# Patient Record
Sex: FEMALE | Race: WHITE | Employment: FULL TIME | URBAN - METROPOLITAN AREA
[De-identification: names, ages, dates, MRNs, and addresses within clinical notes are randomized per-mention and may not be internally consistent; named-entity substitution may affect disease eponyms.]

---

## 2017-01-26 RX ORDER — FLUCONAZOLE 150 MG/1
150 TABLET ORAL ONCE
Qty: 2 TABLET | Refills: 0 | Status: SHIPPED | OUTPATIENT
Start: 2017-01-26 | End: 2017-01-26

## 2017-03-02 ENCOUNTER — HOSPITAL ENCOUNTER (OUTPATIENT)
Age: 42
Setting detail: SPECIMEN
Discharge: HOME | End: 2017-03-02
Payer: COMMERCIAL

## 2017-03-02 ENCOUNTER — OFFICE VISIT (OUTPATIENT)
Dept: OBGYN | Facility: CLINIC | Age: 42
End: 2017-03-02

## 2017-03-02 VITALS
RESPIRATION RATE: 18 BRPM | SYSTOLIC BLOOD PRESSURE: 132 MMHG | WEIGHT: 221 LBS | HEART RATE: 84 BPM | DIASTOLIC BLOOD PRESSURE: 86 MMHG | HEIGHT: 64 IN | BODY MASS INDEX: 37.73 KG/M2

## 2017-03-02 DIAGNOSIS — Z01.411 ENCOUNTER FOR GYNECOLOGICAL EXAMINATION (GENERAL) (ROUTINE) WITH ABNORMAL FINDINGS: ICD-10-CM

## 2017-03-02 DIAGNOSIS — Z87.42 HISTORY OF ENDOMETRIOSIS: ICD-10-CM

## 2017-03-02 DIAGNOSIS — Z12.31 ENCOUNTER FOR SCREENING MAMMOGRAM FOR BREAST CANCER: ICD-10-CM

## 2017-03-02 DIAGNOSIS — Z01.419 WELL FEMALE EXAM WITH ROUTINE GYNECOLOGICAL EXAM: Primary | ICD-10-CM

## 2017-03-02 DIAGNOSIS — Z11.51 SPECIAL SCREENING EXAMINATION FOR HUMAN PAPILLOMAVIRUS (HPV): ICD-10-CM

## 2017-03-02 PROCEDURE — 99396 PREV VISIT EST AGE 40-64: CPT | Performed by: ADVANCED PRACTICE MIDWIFE

## 2017-03-02 PROCEDURE — 87624 HPV HI-RISK TYP POOLED RSLT: CPT

## 2017-03-02 PROCEDURE — G0145 SCR C/V CYTO,THINLAYER,RESCR: HCPCS

## 2017-03-02 RX ORDER — IBUPROFEN 600 MG/1
600 TABLET ORAL EVERY 6 HOURS PRN
Qty: 30 TABLET | Refills: 1 | Status: SHIPPED | OUTPATIENT
Start: 2017-03-02 | End: 2020-01-29

## 2017-03-02 ASSESSMENT — PATIENT HEALTH QUESTIONNAIRE - PHQ9
SUM OF ALL RESPONSES TO PHQ9 QUESTIONS 1 & 2: 0
1. LITTLE INTEREST OR PLEASURE IN DOING THINGS: 0
2. FEELING DOWN, DEPRESSED OR HOPELESS: 0
SUM OF ALL RESPONSES TO PHQ QUESTIONS 1-9: 0

## 2017-03-06 LAB
HPV SAMPLE: NORMAL
HPV SOURCE: NORMAL
HPV, GENOTYPE 16: NOT DETECTED
HPV, GENOTYPE 18: NOT DETECTED
HPV, HIGH RISK OTHER: NOT DETECTED
HPV, INTERPRETATION: NORMAL

## 2017-03-12 LAB — CYTOLOGY REPORT: NORMAL

## 2017-03-14 ENCOUNTER — HOSPITAL ENCOUNTER (OUTPATIENT)
Dept: WOMENS IMAGING | Age: 42
Discharge: HOME | End: 2017-03-14
Payer: COMMERCIAL

## 2017-03-14 ENCOUNTER — TELEPHONE (OUTPATIENT)
Dept: OBGYN CLINIC | Age: 42
End: 2017-03-14

## 2017-03-14 DIAGNOSIS — N63.0 BREAST MASS: Primary | ICD-10-CM

## 2017-03-14 DIAGNOSIS — Z87.42 HISTORY OF ENDOMETRIOSIS: ICD-10-CM

## 2017-03-14 DIAGNOSIS — N63.0 BREAST MASS SEEN ON MAMMOGRAM: ICD-10-CM

## 2017-03-14 DIAGNOSIS — Z12.31 ENCOUNTER FOR SCREENING MAMMOGRAM FOR BREAST CANCER: ICD-10-CM

## 2017-03-14 DIAGNOSIS — N93.9 ABNORMAL UTERINE BLEEDING: ICD-10-CM

## 2017-03-14 DIAGNOSIS — Z01.419 WELL FEMALE EXAM WITH ROUTINE GYNECOLOGICAL EXAM: ICD-10-CM

## 2017-03-14 PROCEDURE — 76856 US EXAM PELVIC COMPLETE: CPT

## 2017-03-14 PROCEDURE — G0202 SCR MAMMO BI INCL CAD: HCPCS

## 2017-03-14 PROCEDURE — 76830 TRANSVAGINAL US NON-OB: CPT

## 2017-03-23 ENCOUNTER — HOSPITAL ENCOUNTER (OUTPATIENT)
Dept: WOMENS IMAGING | Age: 42
Discharge: HOME | End: 2017-03-23
Payer: COMMERCIAL

## 2017-03-23 DIAGNOSIS — N63.0 BREAST MASS SEEN ON MAMMOGRAM: ICD-10-CM

## 2017-03-23 DIAGNOSIS — N63.0 BREAST MASS: ICD-10-CM

## 2017-03-23 DIAGNOSIS — N63.20 LEFT BREAST MASS: ICD-10-CM

## 2017-03-23 PROCEDURE — 76642 ULTRASOUND BREAST LIMITED: CPT

## 2017-03-24 ENCOUNTER — TELEPHONE (OUTPATIENT)
Dept: OBGYN CLINIC | Age: 42
End: 2017-03-24

## 2017-03-27 DIAGNOSIS — N63.0 BREAST MASS: Primary | ICD-10-CM

## 2017-04-06 ENCOUNTER — HOSPITAL ENCOUNTER (OUTPATIENT)
Dept: WOMENS IMAGING | Age: 42
Discharge: HOME | End: 2017-04-06
Payer: COMMERCIAL

## 2017-04-06 VITALS — SYSTOLIC BLOOD PRESSURE: 129 MMHG | DIASTOLIC BLOOD PRESSURE: 93 MMHG | HEART RATE: 96 BPM

## 2017-04-06 DIAGNOSIS — R92.8 ABNORMAL MAMMOGRAM: ICD-10-CM

## 2017-04-06 PROCEDURE — 88305 TISSUE EXAM BY PATHOLOGIST: CPT

## 2017-04-06 PROCEDURE — G0206 DX MAMMO INCL CAD UNI: HCPCS

## 2017-04-06 PROCEDURE — 19083 BX BREAST 1ST LESION US IMAG: CPT

## 2017-04-07 LAB — SURGICAL PATHOLOGY REPORT: NORMAL

## 2017-04-11 ENCOUNTER — OFFICE VISIT (OUTPATIENT)
Dept: OBGYN CLINIC | Age: 42
End: 2017-04-11
Payer: COMMERCIAL

## 2017-04-11 VITALS
SYSTOLIC BLOOD PRESSURE: 118 MMHG | HEART RATE: 78 BPM | WEIGHT: 220 LBS | HEIGHT: 64 IN | DIASTOLIC BLOOD PRESSURE: 76 MMHG | BODY MASS INDEX: 37.56 KG/M2

## 2017-04-11 DIAGNOSIS — F41.9 ANXIETY: ICD-10-CM

## 2017-04-11 DIAGNOSIS — N83.201 CYST OF RIGHT OVARY: Primary | ICD-10-CM

## 2017-04-11 PROCEDURE — 99213 OFFICE O/P EST LOW 20 MIN: CPT | Performed by: OBSTETRICS & GYNECOLOGY

## 2017-07-11 ENCOUNTER — HOSPITAL ENCOUNTER (OUTPATIENT)
Age: 42
Setting detail: SPECIMEN
Discharge: HOME | End: 2017-07-11
Payer: COMMERCIAL

## 2017-07-11 LAB
ALT SERPL-CCNC: 28 U/L (ref 5–33)
ANION GAP SERPL CALCULATED.3IONS-SCNC: 14 MMOL/L (ref 9–17)
BUN BLDV-MCNC: 11 MG/DL (ref 6–20)
CHLORIDE BLD-SCNC: 98 MMOL/L (ref 98–107)
CHOLESTEROL, FASTING: 168 MG/DL
CHOLESTEROL/HDL RATIO: 4
CO2: 25 MMOL/L (ref 20–31)
CREAT SERPL-MCNC: 0.54 MG/DL (ref 0.5–0.9)
CREATININE URINE: 183.4 MG/DL (ref 28–217)
GFR AFRICAN AMERICAN: >60 ML/MIN
GFR NON-AFRICAN AMERICAN: >60 ML/MIN
GFR SERPL CREATININE-BSD FRML MDRD: NORMAL ML/MIN/{1.73_M2}
GFR SERPL CREATININE-BSD FRML MDRD: NORMAL ML/MIN/{1.73_M2}
HDLC SERPL-MCNC: 42 MG/DL
LDL CHOLESTEROL: 104 MG/DL (ref 0–130)
MICROALBUMIN/CREAT 24H UR: <12 MG/L
MICROALBUMIN/CREAT UR-RTO: 7 MCG/MG CREAT
POTASSIUM SERPL-SCNC: 4.2 MMOL/L (ref 3.7–5.3)
SODIUM BLD-SCNC: 137 MMOL/L (ref 135–144)
TRIGLYCERIDE, FASTING: 108 MG/DL
VLDLC SERPL CALC-MCNC: NORMAL MG/DL (ref 1–30)

## 2017-10-04 ENCOUNTER — TELEPHONE (OUTPATIENT)
Dept: OBGYN CLINIC | Age: 42
End: 2017-10-04

## 2017-10-04 RX ORDER — FLUCONAZOLE 150 MG/1
150 TABLET ORAL ONCE
Qty: 2 TABLET | Refills: 0 | Status: SHIPPED | OUTPATIENT
Start: 2017-10-04 | End: 2017-10-04

## 2017-10-11 ENCOUNTER — HOSPITAL ENCOUNTER (OUTPATIENT)
Dept: WOMENS IMAGING | Age: 42
Discharge: HOME | End: 2017-10-11
Payer: COMMERCIAL

## 2017-10-11 DIAGNOSIS — Z09 FOLLOW-UP EXAM, 3-6 MONTHS SINCE PREVIOUS EXAM: ICD-10-CM

## 2017-10-11 PROCEDURE — G0279 TOMOSYNTHESIS, MAMMO: HCPCS

## 2018-01-02 ENCOUNTER — TELEPHONE (OUTPATIENT)
Dept: OBGYN CLINIC | Age: 43
End: 2018-01-02

## 2018-01-02 RX ORDER — FLUCONAZOLE 150 MG/1
150 TABLET ORAL ONCE
Qty: 2 TABLET | Refills: 0 | Status: SHIPPED | OUTPATIENT
Start: 2018-01-02 | End: 2018-01-02

## 2018-01-02 NOTE — TELEPHONE ENCOUNTER
Pt has symptoms of yeast infection , can she get a script called into [facility]

## 2018-03-12 ENCOUNTER — TELEPHONE (OUTPATIENT)
Dept: OBGYN CLINIC | Age: 43
End: 2018-03-12

## 2018-03-12 RX ORDER — FLUCONAZOLE 150 MG/1
150 TABLET ORAL ONCE
Qty: 2 TABLET | Refills: 0 | Status: SHIPPED | OUTPATIENT
Start: 2018-03-12 | End: 2018-07-24 | Stop reason: SDUPTHER

## 2018-03-27 ENCOUNTER — OFFICE VISIT (OUTPATIENT)
Dept: OBGYN CLINIC | Age: 43
End: 2018-03-27
Payer: COMMERCIAL

## 2018-03-27 VITALS
DIASTOLIC BLOOD PRESSURE: 72 MMHG | HEIGHT: 64 IN | SYSTOLIC BLOOD PRESSURE: 124 MMHG | HEART RATE: 78 BPM | WEIGHT: 229 LBS | BODY MASS INDEX: 39.09 KG/M2 | RESPIRATION RATE: 18 BRPM

## 2018-03-27 DIAGNOSIS — N89.8 VAGINAL IRRITATION: Primary | ICD-10-CM

## 2018-03-27 DIAGNOSIS — Z01.419 ENCOUNTER FOR GYNECOLOGICAL EXAMINATION (GENERAL) (ROUTINE) WITHOUT ABNORMAL FINDINGS: ICD-10-CM

## 2018-03-27 PROCEDURE — 99396 PREV VISIT EST AGE 40-64: CPT | Performed by: ADVANCED PRACTICE MIDWIFE

## 2018-03-27 RX ORDER — CLOTRIMAZOLE AND BETAMETHASONE DIPROPIONATE 10; .64 MG/G; MG/G
CREAM TOPICAL
Qty: 1 TUBE | Refills: 1 | Status: SHIPPED | OUTPATIENT
Start: 2018-03-27 | End: 2018-03-27 | Stop reason: SDUPTHER

## 2018-03-27 RX ORDER — CLOTRIMAZOLE AND BETAMETHASONE DIPROPIONATE 10; .64 MG/G; MG/G
CREAM TOPICAL
Qty: 1 TUBE | Refills: 1 | Status: SHIPPED | OUTPATIENT
Start: 2018-03-27 | End: 2020-09-22

## 2018-03-27 ASSESSMENT — PATIENT HEALTH QUESTIONNAIRE - PHQ9
SUM OF ALL RESPONSES TO PHQ9 QUESTIONS 1 & 2: 0
1. LITTLE INTEREST OR PLEASURE IN DOING THINGS: 0
SUM OF ALL RESPONSES TO PHQ QUESTIONS 1-9: 0
2. FEELING DOWN, DEPRESSED OR HOPELESS: 0

## 2018-03-27 NOTE — PROGRESS NOTES
Density:      ________________________________________________________________________  REVIEW OF SYSTEMS:    yes   A minimum of an eleven point review of systems was completed. Review Of Systems (11 point):  Constitutional: No fever, chills or malaise; No weight change or fatigue  Head and Eyes: No vision, Headache, Dizziness or trauma in last 12 months  ENT ROS: No hearing, Tinnitis, sinus or taste problems  Hematological and Lymphatic ROS:No Lymphoma, Von Willebrand's, Hemophillia or Bleeding History  Psych ROS: No Depression, Homicidal thoughts,suicidal thoughts, or anxiety  Breast ROS: No prior breast abnormalities or lumps  Respiratory ROS: No SOB, Pneumoniae,Cough, or Pulmonary Embolism History  Cardiovascular ROS: No Chest Pain with Exertion, Palpitations, Syncope, Edema, Arrhythmia  Gastrointestinal ROS: No Indigestion, Heartburn, Nausea, vomiting, Diarrhea, Constipation,or Bowel Changes; No Bloody Stools or melena  Genito-Urinary ROS: No Dysuria, Hematuria or Nocturia. No Urinary Incontinence or Vaginal Discharge  Musculoskeletal ROS: No Arthralgia, Arthritis,Gout,Osteoporosis or Rheumatism  Neurological ROS: No CVA, Migraines, Epilepsy, Seizure Hx, or Limb Weakness  Dermatological ROS: No Rash, Itching, Hives, Mole Changes or Cancer                                                                                                                                                                                                                                  PHYSICAL Exam:     Constitutional:  Vitals:    03/27/18 1504   BP: 124/72   Site: Left Arm   Position: Sitting   Cuff Size: Medium Adult   Pulse: 78   Resp: 18   Weight: 229 lb (103.9 kg)   Height: 5' 4\" (1.626 m)       General Appearance: This  is a well Developed, well Nourished, well groomed female. Her BMI was reviewed. Nutritional decision making was discussed.     Skin:  There was a Normal Inspection of the skin without rashes or lesions. There were no rashes. (Papular, Maculopapular, Hives, Pustular, Macular)     There were no lesions (Ulcers, Erythema, Abn. Appearing Nevi)            Lymphatic:  No Lymph Nodes were Palpable in the neck , axilla or groin. # Of Lymph Nodes; Location ; Character [Normal]  [Shotty] [Tender] [Enlarged]     Neck and EENT:  The neck was supple. There were no masses   The thyroid was not enlarged and had no masses. Perrla, EOMI B/L, TMI B/L No Abnormalities. Throat inspected-No exudates or Masses, Nares Patent No Masses        Respiratory: The lungs were auscultated and found to be clear. There were no rales, rhonchi or wheezes. There was a good respiratory effort. Cardiovascular: The heart was in a regular rate and rhythm. . No S3 or S4. There was no murmur appreciated. Location, grade, and radiation are not applicable. Extremities: The patients extremities were without calf tenderness, edema, or varicosities. There was full range of motion in all four extremities. Pulses in all four extremities were appreciated and are 2/4. Abdomen: The abdomen was soft and non-tender. There were good bowel sounds in all quadrants and there was no guarding, rebound or rigidity. On evaluation there was no evidence of hepatosplenomegaly and there was no costal vertebral laurel tenderness bilaterally. No hernias were appreciated. Abdominal Scars: C/S    Psych: The patient had a normal Orientation to: Time, Place, Person, and Situation  There is no Mood / Affect changes    Breast:  (Chest)  normal appearance, no masses or tenderness, Inspection negative  Self breast exams were reviewed in detail. Literature was given. Pelvic Exam:  Vulva and vagina appear normal. Bimanual exam reveals absent uterus and adnexa, cuff intact. White discharge noted    Rectal Exam:  exam declined by patient          Musculosk:  Normal Gait and station was noted. Digits were evaluated without abnormal findings.   Range of motion, stability and strength were evaluated and found to be appropriate for the patients age. OM Structural Component:  ASSESSMENT:      40 y.o. Annual  No diagnosis found. Chief Complaint   Patient presents with    Annual Exam          Past Medical History:   Diagnosis Date    Anxiety     Endometriosis     GDM (gestational diabetes mellitus)     H/O hysterectomy for benign disease          Patient Active Problem List    Diagnosis Date Noted    H/O hysterectomy for benign disease     Anxiety     GDM (gestational diabetes mellitus)     Endometriosis      replace inactive diagnosis            Hereditary Breast, Ovarian, Colon and Uterine Cancer screening Done. Tobacco & Secondary smoke risks reviewed; instructed on cessation and avoidance      Counseling Completed:  Preventative Health Recommendations and Follow up. The patient was informed of the recommended preventative health recommendations. 1. Annuals every year; Cytology collections per prevailing guidelines. 2. Mammograms begin every year at 35 yo if no abnormalities are found and no family     History. 3. Bone density studies every 2-3 years. Begin at 71 yo. If no fracture history or osteoporosis family history. (significant). 4. Colonoscopy begin at 49 yo. Repeat every ten years if negative and no family history. 5. Calcium of 9657-1265 mg/day in split dosing  6. Vitamin D 400-800 IU/day  7. All other preventative health recommendations will be managed by the patients Primary care physician. PLAN:  Return in about 1 year (around 3/27/2019) for Annual.   Rx for Lotrisone sent to pharmacy for external itching in vaginal area  Repeat Annual every 1 year  Cervical Cytology Evaluation begins at 24years old. If Negative Cytology, Follow-up screening per current guidelines. Mammograms every 1 year. If 35 yo and last mammogram was negative. Calcium and Vitamin D dosing reviewed.   Colonoscopy screening

## 2018-07-24 RX ORDER — FLUCONAZOLE 150 MG/1
TABLET ORAL
Qty: 2 TABLET | Refills: 0 | Status: SHIPPED | OUTPATIENT
Start: 2018-07-24 | End: 2019-06-14 | Stop reason: SDUPTHER

## 2019-02-06 DIAGNOSIS — Z12.31 SCREENING MAMMOGRAM, ENCOUNTER FOR: Primary | ICD-10-CM

## 2019-04-02 ENCOUNTER — HOSPITAL ENCOUNTER (OUTPATIENT)
Dept: WOMENS IMAGING | Age: 44
Discharge: HOME | End: 2019-04-04
Payer: COMMERCIAL

## 2019-04-02 ENCOUNTER — OFFICE VISIT (OUTPATIENT)
Dept: OBGYN CLINIC | Age: 44
End: 2019-04-02
Payer: COMMERCIAL

## 2019-04-02 VITALS
DIASTOLIC BLOOD PRESSURE: 80 MMHG | WEIGHT: 224 LBS | SYSTOLIC BLOOD PRESSURE: 124 MMHG | HEIGHT: 64 IN | HEART RATE: 91 BPM | RESPIRATION RATE: 18 BRPM | BODY MASS INDEX: 38.24 KG/M2

## 2019-04-02 DIAGNOSIS — Z12.31 SCREENING MAMMOGRAM, ENCOUNTER FOR: ICD-10-CM

## 2019-04-02 DIAGNOSIS — Z01.411 ABNORMAL FEMALE PELVIC EXAM: Primary | ICD-10-CM

## 2019-04-02 DIAGNOSIS — N63.20 LEFT BREAST LUMP: ICD-10-CM

## 2019-04-02 DIAGNOSIS — N63.0 LUMP IN FEMALE BREAST: ICD-10-CM

## 2019-04-02 DIAGNOSIS — Z12.39 SCREENING FOR BREAST CANCER: ICD-10-CM

## 2019-04-02 DIAGNOSIS — R10.2 PELVIC PAIN: ICD-10-CM

## 2019-04-02 PROCEDURE — 76642 ULTRASOUND BREAST LIMITED: CPT

## 2019-04-02 PROCEDURE — 99396 PREV VISIT EST AGE 40-64: CPT | Performed by: NURSE PRACTITIONER

## 2019-04-02 PROCEDURE — G0279 TOMOSYNTHESIS, MAMMO: HCPCS

## 2019-04-02 ASSESSMENT — PATIENT HEALTH QUESTIONNAIRE - PHQ9
SUM OF ALL RESPONSES TO PHQ QUESTIONS 1-9: 0
SUM OF ALL RESPONSES TO PHQ QUESTIONS 1-9: 0
1. LITTLE INTEREST OR PLEASURE IN DOING THINGS: 0
2. FEELING DOWN, DEPRESSED OR HOPELESS: 0
SUM OF ALL RESPONSES TO PHQ9 QUESTIONS 1 & 2: 0

## 2019-04-02 NOTE — PROGRESS NOTES
History and Physical  [address], [address] [place] [address], [address] [place] [address] [phone]   Fax [phone]  Netta Avila  2019              38 y.o. Chief Complaint   Patient presents with   [facility] Gynecologic Exam     abd cramping off and on and breast soreness        Patient's last menstrual period was 2011. Primary Care Physician: Guillaume Henley    The patient was seen and examined. She has no chief complaint today and is here for her annual exam. History of VALENTIN with left oophorectomy in  for pelvic endometriosis. Denies hx of abnormal pap smears. Complaining of right sided pelvic pain- intermittent, menstrual cramping. Patient reports pain occurs monthly - lasting 2 days approximately out of each month. History of right ovarian cyst in . Her bowels are regular. There are no voiding complaints. She denies any bloating. She denies vaginal discharge and was counseled on STD's and the need for barrier contraception. HPI : April Berenice Doherty is a 38 y.o. female     Annual exam  Right sided pelvic pain  ________________________________________________________________________  OB History    Para Term  AB Living   3 2 2 0 0 2   SAB TAB Ectopic Molar Multiple Live Births   0 0 0 0 0 2      # Outcome Date GA Lbr Glenn/2nd Weight Sex Delivery Anes PTL Lv   3                Birth Comments: System Generated. Please review and update pregnancy details.    2 Term    8 lb 8 oz (3.856 kg) M CS-Unspec   NORMA   1 Term    8 lb 4 oz (3.742 kg) M CS-Unspec   NORMA     Past Medical History:   Diagnosis Date    Anxiety     Endometriosis     GDM (gestational diabetes mellitus)     H/O hysterectomy for benign disease                                                                    Past Surgical History:   Procedure Laterality Date     SECTION      x2    ENTEROCELE REPAIR  12    HYSTERECTOMY  12    LAPAROSCOPY then daily for 3 days 1 Tube 1    escitalopram (LEXAPRO) 20 MG tablet Take 20 mg by mouth 2 times daily.  fluconazole (DIFLUCAN) 150 MG tablet TAKE 1 TABLET BY MOUTH ONCE FOR 1 DOSE.  MAY REPEAT DOSE IN 1 WEEK AS NEEDED. 2 tablet 0    ibuprofen (ADVIL;MOTRIN) 600 MG tablet Take 1 tablet by mouth every 6 hours as needed for Pain 30 tablet 1     No current facility-administered medications for this visit. ALLERGIES:  Allergies as of 04/02/2019    (No Known Allergies)       Symptoms of decreased mood absent  Symptoms of anhedonia absent    **If either question is answered in a  positive fashion then complete the PHQ9 Scoring Evaluation and make the appropriate referral**      Immunization status: stated as current, but no records available. Gynecologic History:  Menarche: 16 yo  Menopause at [address] [place] yo     Patient's last menstrual period was 11/24/2011. Sexually Active: Yes    STD History: No     Permanent Sterilization: Yes hysterectomy   Reversible Birth Control: No        Hormone Replacement Exposure: No      Genetic Qualified Family History of Breast, Ovarian , Colon or Uterine Cancer: no     If YES see scanned worksheet. Preventative Health Testing:    Health Maintenance:  Health Maintenance Due   Topic Date Due    HIV screen  04/02/1991    DTaP/Tdap/Td vaccine (1 - Tdap) 04/02/1995    Diabetes screen  04/02/2016       Date of Last Pap Smear: 3/2017 neg/neg  Abnormal Pap Smear History: neg  Colposcopy History:   Date of Last Mammogram: 10/11/2017 left diagnostic negative  Date of Last Colonoscopy:   Date of Last Bone Density:      ________________________________________________________________________        REVIEW OF SYSTEMS:    yes   A minimum of an eleven point review of systems was completed. Review Of Systems (11 point):  Constitutional: No fever, chills or malaise;  No weight change or fatigue  Head and Eyes: No vision, Headache, Dizziness or trauma in last 12 months  ENT ROS: No hearing, Tinnitis, sinus or taste problems  Hematological and Lymphatic ROS:No Lymphoma, Von Willebrand's, Hemophillia or Bleeding History  Psych ROS: No Depression, Homicidal thoughts,suicidal thoughts, or anxiety  Breast ROS: No prior breast abnormalities or lumps. + left clip placed 4/2017  Respiratory ROS: No SOB, Pneumoniae,Cough, or Pulmonary Embolism History  Cardiovascular ROS: No Chest Pain with Exertion, Palpitations, Syncope, Edema, Arrhythmia  Gastrointestinal ROS: No Indigestion, Heartburn, Nausea, vomiting, Diarrhea, Constipation,or Bowel Changes; No Bloody Stools or melena  Genito-Urinary ROS: No Dysuria, Hematuria or Nocturia. No Urinary Incontinence or Vaginal Discharge  Musculoskeletal ROS: No Arthralgia, Arthritis,Gout,Osteoporosis or Rheumatism  Neurological ROS: No CVA, Migraines, Epilepsy, Seizure Hx, or Limb Weakness. + diabetes  Dermatological ROS: No Rash, Itching, Hives, Mole Changes or Cancer                                                                                                                                                                                                                                  PHYSICAL Exam:     Constitutional:  Vitals:    04/02/19 0834   BP: 124/80   Site: Right Upper Arm   Position: Sitting   Cuff Size: Large Adult   Pulse: 91   Resp: 18   Weight: 224 lb (101.6 kg)   Height: 5' 4\" (1.626 m)         General Appearance: This  is a well Developed, well Nourished, well groomed female. Her BMI was reviewed. Nutritional decision making was discussed. Skin:  There was a Normal Inspection of the skin without rashes or lesions. There were no rashes. (Papular, Maculopapular, Hives, Pustular, Macular)     There were no lesions (Ulcers, Erythema, Abn. Appearing Nevi)            Lymphatic:  No Lymph Nodes were Palpable in the neck , axilla or groin.  0 # Of Lymph Nodes;  Location ; Character [Normal]  [Shotty] [Tender] [Enlarged]     Neck and EENT:  The neck was supple. There were no masses   The thyroid was not enlarged and had no masses. Perrla, EOMI B/L, TMI B/L No Abnormalities. Throat inspected-No exudates or Masses, Nares Patent No Masses        Respiratory: The lungs were auscultated and found to be clear. There were no rales, rhonchi or wheezes. There was a good respiratory effort. Cardiovascular: The heart was in a regular rate and rhythm. . No S3 or S4. There was no murmur appreciated. Location, grade, and radiation are not applicable. Extremities: The patients extremities were without calf tenderness, edema, or varicosities. There was full range of motion in all four extremities. Pulses in all four extremities were appreciated and are 2/4. Abdomen: The abdomen was soft and non-tender. There were good bowel sounds in all quadrants and there was no guarding, rebound or rigidity. On evaluation there was no evidence of hepatosplenomegaly and there was no costal vertebral laurel tenderness bilaterally. No hernias were appreciated. Abdominal Scars: hyst scar    Psych: The patient had a normal Orientation to: Time, Place, Person, and Situation  There is no Mood / Affect changes    Breast:  (Chest)  normal appearance. No nipple retraction or dimpling, No nipple discharge or bleeding, No axillary or supraclavicular adenopathy. Right breast lump palpated at 2 o'clock position, dense breast tissue noted bilaterally. Self breast exams were reviewed in detail. Literature was given. Pelvic Exam:  Vulva and vagina appear normal. Bimanual exam reveals normal cuff intact    Rectal Exam:  exam declined by patient          Musculosk:  Normal Gait and station was noted. Digits were evaluated without abnormal findings. Range of motion, stability and strength were evaluated and found to be appropriate for the patients age. ASSESSMENT:      38 y.o. Annual   Diagnosis Orders   1. Abnormal female pelvic exam     2.  Pelvic pain Mammograms every 1 year. If 35 yo and last mammogram was negative. Calcium and Vitamin D dosing reviewed. Colonoscopy screening reviewed as well as onset for bone density testing. Birth control and barrier recommendations discussed. STD counseling and prevention reviewed. Gardisil counseling completed for all patients 10-35 yo. Routine health maintenance per patients PCP.   Orders Placed This Encounter   Procedures    US Pelvis Complete     Standing Status:   Future     Standing Expiration Date:   7/2/2019     Order Specific Question:   Reason for exam:     Answer:   pelvic pain- right sided    US Non OB Transvaginal     Standing Status:   Future     Standing Expiration Date:   10/2/2019     Order Specific Question:   Reason for exam:     Answer:   pelvic pain- right sided    CHRISTOPHER DIGITAL DIAGNOSTIC W OR WO CAD BILATERAL     Standing Status:   Future     Standing Expiration Date:   6/2/2020     Order Specific Question:   Reason for exam:     Answer:   left breast lump at 2 o'clock position

## 2019-04-10 ENCOUNTER — OFFICE VISIT (OUTPATIENT)
Dept: OBGYN CLINIC | Age: 44
End: 2019-04-10
Payer: COMMERCIAL

## 2019-04-10 DIAGNOSIS — R10.2 PELVIC PAIN: ICD-10-CM

## 2019-04-10 PROCEDURE — 76830 TRANSVAGINAL US NON-OB: CPT | Performed by: OBSTETRICS & GYNECOLOGY

## 2019-06-03 ENCOUNTER — TELEPHONE (OUTPATIENT)
Dept: OBGYN CLINIC | Age: 44
End: 2019-06-03

## 2019-06-03 RX ORDER — FLUCONAZOLE 150 MG/1
150 TABLET ORAL ONCE
Qty: 2 TABLET | Refills: 0 | Status: SHIPPED | OUTPATIENT
Start: 2019-06-03 | End: 2019-06-03

## 2019-06-03 NOTE — TELEPHONE ENCOUNTER
Patient states she has a yeast infection and needs a prescription sent to [facility] in [place] on [address] [place] [address].

## 2019-06-14 ENCOUNTER — TELEPHONE (OUTPATIENT)
Dept: OBGYN CLINIC | Age: 44
End: 2019-06-14

## 2019-06-14 RX ORDER — FLUCONAZOLE 150 MG/1
TABLET ORAL
Qty: 2 TABLET | Refills: 0 | Status: SHIPPED | OUTPATIENT
Start: 2019-06-14 | End: 2020-09-22

## 2019-08-21 ENCOUNTER — TELEPHONE (OUTPATIENT)
Dept: OBGYN CLINIC | Age: 44
End: 2019-08-21

## 2019-08-21 RX ORDER — FLUCONAZOLE 150 MG/1
150 TABLET ORAL ONCE
Qty: 2 TABLET | Refills: 0 | Status: SHIPPED | OUTPATIENT
Start: 2019-08-21 | End: 2019-08-21

## 2020-09-22 ENCOUNTER — HOSPITAL ENCOUNTER (OUTPATIENT)
Dept: WOMENS IMAGING | Age: 45
Discharge: HOME | End: 2020-09-24
Payer: COMMERCIAL

## 2020-09-22 ENCOUNTER — OFFICE VISIT (OUTPATIENT)
Dept: OBGYN CLINIC | Age: 45
End: 2020-09-22
Payer: COMMERCIAL

## 2020-09-22 VITALS
HEART RATE: 86 BPM | TEMPERATURE: 97.9 F | SYSTOLIC BLOOD PRESSURE: 126 MMHG | WEIGHT: 239 LBS | BODY MASS INDEX: 40.8 KG/M2 | HEIGHT: 64 IN | DIASTOLIC BLOOD PRESSURE: 84 MMHG

## 2020-09-22 PROCEDURE — 76642 ULTRASOUND BREAST LIMITED: CPT

## 2020-09-22 PROCEDURE — 99396 PREV VISIT EST AGE 40-64: CPT | Performed by: NURSE PRACTITIONER

## 2020-09-22 PROCEDURE — G0279 TOMOSYNTHESIS, MAMMO: HCPCS

## 2020-09-22 ASSESSMENT — PATIENT HEALTH QUESTIONNAIRE - PHQ9
1. LITTLE INTEREST OR PLEASURE IN DOING THINGS: 0
SUM OF ALL RESPONSES TO PHQ QUESTIONS 1-9: 0
SUM OF ALL RESPONSES TO PHQ9 QUESTIONS 1 & 2: 0
2. FEELING DOWN, DEPRESSED OR HOPELESS: 0
SUM OF ALL RESPONSES TO PHQ QUESTIONS 1-9: 0

## 2020-09-22 NOTE — PROGRESS NOTES
History and Physical  [address], [address] [place] [address], [address] [facility] [phone]   Fax [phone]  April L Toeppe  2020              41 y.o. Chief Complaint   Patient presents with    Annual Exam       Patient's last menstrual period was 2011. Primary Care Physician: Rosenda Sommer    The patient was seen and examined. She has no chief complaint today and is here for her annual exam.  Her bowels are regular. There are no voiding complaints. She denies any bloating. She denies vaginal discharge and was counseled on STD's and the need for barrier contraception. HPI : Dave Cook is a 41 y.o. female     Annual exam  Complaining of left breast lump x 1 week. Tender with palpation. Denies nipple discharge. Hx of left breast biopsy for benign fibroadenoma  with clip placement. Patient reports lump is in different spot than previous biopsy. Family hx of breast cancer- maternal aunt with breast cancer- 76s. Hysterectomy in  for benign disease, endometriosis. ________________________________________________________________________  OB History    Para Term  AB Living   3 2 2 0 0 2   SAB TAB Ectopic Molar Multiple Live Births   0 0 0 0 0 2      # Outcome Date GA Lbr Glenn/2nd Weight Sex Delivery Anes PTL Lv   3                Birth Comments: System Generated. Please review and update pregnancy details.    2 Term    8 lb 8 oz (3.856 kg) M CS-Unspec   NORMA   1 Term    8 lb 4 oz (3.742 kg) M CS-Unspec   NORMA     Past Medical History:   Diagnosis Date    Anxiety     Endometriosis     GDM (gestational diabetes mellitus)     H/O hysterectomy for benign disease                                                                    Past Surgical History:   Procedure Laterality Date     SECTION      x2    ENTEROCELE REPAIR  12    HYSTERECTOMY  12    LAPAROSCOPY  13    lysis of adhesions  TUBAL LIGATION      w/ mirena removal     Family History   Problem Relation Age of Onset    Hypertension Mother     Breast Cancer Maternal Aunt     Cancer Neg Hx     Colon Cancer Neg Hx     Diabetes Neg Hx     Eclampsia Neg Hx     Ovarian Cancer Neg Hx      Labor Neg Hx     Spont Abortions Neg Hx     Stroke Neg Hx      Social History     Socioeconomic History    Marital status:      Spouse name: Not on file    Number of children: Not on file    Years of education: Not on file    Highest education level: Not on file   Occupational History    Not on file   Social Needs    Financial resource strain: Not on file    Food insecurity     Worry: Not on file     Inability: Not on file    Transportation needs     Medical: Not on file     Non-medical: Not on file   Tobacco Use    Smoking status: Never Smoker    Smokeless tobacco: Never Used   Substance and Sexual Activity    Alcohol use: No    Drug use: No    Sexual activity: Yes     Partners: Male     Birth control/protection: Surgical   Lifestyle    Physical activity     Days per week: Not on file     Minutes per session: Not on file    Stress: Not on file   Relationships    Social connections     Talks on phone: Not on file     Gets together: Not on file     Attends [employer] service: Not on file     Active member of club or organization: Not on file     Attends meetings of clubs or organizations: Not on file     Relationship status: Not on file    Intimate partner violence     Fear of current or ex partner: Not on file     Emotionally abused: Not on file     Physically abused: Not on file     Forced sexual activity: Not on file   Other Topics Concern    Not on file   Social History Narrative    Not on file       MEDICATIONS:  Current Outpatient Medications   Medication Sig Dispense Refill    nateglinide (STARLIX) 120 MG tablet Take 120 mg by mouth daily      pioglitazone (ACTOS) 45 MG tablet Take 45 mg by mouth daily  SITagliptin (JANUVIA) 100 MG tablet Take 100 mg by mouth daily      escitalopram (LEXAPRO) 20 MG tablet Take 20 mg by mouth 2 times daily. No current facility-administered medications for this visit. ALLERGIES:  Allergies as of 09/22/2020 - Review Complete 09/22/2020   Allergen Reaction Noted    Ketorolac Other (See Comments) 01/09/2020       Symptoms of decreased mood absent  Symptoms of anhedonia absent    **If either question is answered in a  positive fashion then complete the PHQ9 Scoring Evaluation and make the appropriate referral**      Immunization status: stated as current, but no records available. Gynecologic History:  Menarche: 16 yo  Menopause at [address] yo     Patient's last menstrual period was 11/24/2011. Sexually Active: Yes    STD History: No     Permanent Sterilization: Yes hyst   Reversible Birth Control: No        Hormone Replacement Exposure: No      Genetic Qualified Family History of Breast, Ovarian , Colon or Uterine Cancer: No     If YES see scanned worksheet. Preventative Health Testing:    Health Maintenance:  Health Maintenance Due   Topic Date Due    HIV screen  04/02/1991    DTaP/Tdap/Td vaccine (1 - Tdap) 04/02/1995    Diabetes screen  04/02/2016    Flu vaccine (1) 09/01/2020       Date of Last Pap Smear: 3/2/2017 neg/neg  Abnormal Pap Smear History: denies  Colposcopy History:   Date of Last Mammogram: 4/2/2019 benign - no evidence of malignancy. Follow up in 12 months. Date of Last Colonoscopy:   Date of Last Bone Density:      ________________________________________________________________________        REVIEW OF SYSTEMS:    yes   A minimum of an eleven point review of systems was completed. Review Of Systems (11 point):  Constitutional: No fever, chills or malaise;  No weight change or fatigue  Head and Eyes: No vision, Headache, Dizziness or trauma in last 12 months  ENT ROS: No hearing, Tinnitis, sinus or taste problems  Hematological and Lymphatic ROS:No Lymphoma, Von Willebrand's, Hemophillia or Bleeding History  Psych ROS: No Depression, Homicidal thoughts,suicidal thoughts, or anxiety  Breast ROS: No prior breast abnormalities or lumps. + left breast biopsy, + left breast lump  Respiratory ROS: No SOB, Pneumoniae,Cough, or Pulmonary Embolism History  Cardiovascular ROS: No Chest Pain with Exertion, Palpitations, Syncope, Edema, Arrhythmia  Gastrointestinal ROS: No Indigestion, Heartburn, Nausea, vomiting, Diarrhea, Constipation,or Bowel Changes; No Bloody Stools or melena  Genito-Urinary ROS: No Dysuria, Hematuria or Nocturia. No Urinary Incontinence or Vaginal Discharge  Musculoskeletal ROS: No Arthralgia, Arthritis,Gout,Osteoporosis or Rheumatism  Neurological ROS: No CVA, Migraines, Epilepsy, Seizure Hx, or Limb Weakness. + type II diabetes  Dermatological ROS: No Rash, Itching, Hives, Mole Changes or Cancer                                                                                                                                                                                                                                  PHYSICAL Exam:     Constitutional:  Vitals:    09/22/20 1224   BP: 126/84   Site: Right Upper Arm   Position: Sitting   Cuff Size: Large Adult   Pulse: 86   Temp: 97.9 °F (36.6 °C)   Weight: 239 lb (108.4 kg)   Height: 5' 4\" (1.626 m)         General Appearance: This  is a well Developed, well Nourished, well groomed female. Her BMI was reviewed. Nutritional decision making was discussed. Skin:  There was a Normal Inspection of the skin without rashes or lesions. There were no rashes. (Papular, Maculopapular, Hives, Pustular, Macular)     There were no lesions (Ulcers, Erythema, Abn. Appearing Nevi)            Lymphatic:  No Lymph Nodes were Palpable in the neck , axilla or groin.  0 # Of Lymph Nodes; Location ; Character [Normal]  [Shotty] [Tender] [Enlarged]     Neck and EENT:  The neck was supple.  There were no masses   The thyroid was not enlarged and had no masses. Perrla, EOMI B/L, TMI B/L No Abnormalities. Throat inspected-No exudates or Masses, Nares Patent No Masses        Respiratory: The lungs were auscultated and found to be clear. There were no rales, rhonchi or wheezes. There was a good respiratory effort. Cardiovascular: The heart was in a regular rate and rhythm. . No S3 or S4. There was no murmur appreciated. Location, grade, and radiation are not applicable. Extremities: The patients extremities were without calf tenderness, edema, or varicosities. There was full range of motion in all four extremities. Pulses in all four extremities were appreciated and are 2/4. Abdomen: The abdomen was soft and non-tender. There were good bowel sounds in all quadrants and there was no guarding, rebound or rigidity. On evaluation there was no evidence of hepatosplenomegaly and there was no costal vertebral laurel tenderness bilaterally. No hernias were appreciated. Abdominal Scars: hyst scar    Psych: The patient had a normal Orientation to: Time, Place, Person, and Situation  There is no Mood / Affect changes    Breast:  (Chest)  normal appearance, no masses or tenderness right side. No nipple retraction or dimpling, No nipple discharge or bleeding. Left breast lump palpated at 2 o'clock and 5 o'clock position. No nipple discharge or bleeding noted. No supraclavicular or axillary adenopathy noted. Self breast exams were reviewed in detail. Literature was given. Pelvic Exam:  Vulva and vagina appear normal. Bimanual exam reveals normal cuff intact. Rectal Exam:  exam declined by patient          Musculosk:  Normal Gait and station was noted. Digits were evaluated without abnormal findings. Range of motion, stability and strength were evaluated and found to be appropriate for the patients age. ASSESSMENT:      41 y.o. Annual   Diagnosis Orders   1.  Encounter for well woman exam with abnormal findings     2. Breast lump on left side at 2 o'clock position     3. Breast lump on left side at 5 o'clock position            Chief Complaint   Patient presents with    Annual Exam          Past Medical History:   Diagnosis Date    Anxiety     Endometriosis     GDM (gestational diabetes mellitus)     H/O hysterectomy for benign disease          Patient Active Problem List    Diagnosis Date Noted    H/O hysterectomy for benign disease     Anxiety     GDM (gestational diabetes mellitus)     Endometriosis      replace inactive diagnosis            Hereditary Breast, Ovarian, Colon and Uterine Cancer screening Done. Tobacco & Secondary smoke risks reviewed; instructed on cessation and avoidance      Counseling Completed:  Preventative Health Recommendations and Follow up. The patient was informed of the recommended preventative health recommendations. 1. Annuals every year; Cytology collections per prevailing guidelines. 2. Mammograms begin every year at 35 yo if no abnormalities are found and no family history. 3. Bone density studies every 2-3 years. Begin at 73 yo. If no fracture history or osteoporosis family history. (significant). 4. Colonoscopy begin at 40 yo. Repeat every ten years if negative and no family history. 5. Calcium of 4075-1280 mg/day in split dosing  6. Vitamin D 400-800 IU/day  7. All other preventative health recommendations will be managed by the patients Primary care physician. PLAN:  No follow-ups on file. Diagnostic mammogram and ultrasound previously ordered. Patient scheduled today for testing. Repeat Annual every 1 year  Cervical Cytology Evaluation begins at 24years old. If Negative Cytology, Follow-up screening per current guidelines. Mammograms every 1 year. If 35 yo and last mammogram was negative. Calcium and Vitamin D dosing reviewed.   Colonoscopy screening reviewed as well as onset for bone density testing. Birth control and barrier recommendations discussed. STD counseling and prevention reviewed. Gardisil counseling completed for all patients 10-35 yo. Routine health maintenance per patients PCP. No orders of the defined types were placed in this encounter.

## 2020-09-23 ENCOUNTER — TELEPHONE (OUTPATIENT)
Dept: OBGYN CLINIC | Age: 45
End: 2020-09-23

## 2020-09-23 NOTE — TELEPHONE ENCOUNTER
----- Message from CLAUDIO Cleaning CNP sent at 9/22/2020  5:08 PM EDT -----  Diagnostic mammogram benign. No evidence of malignancy in either breast.  Recommend annual screening in 12 months.   Please let patient know

## 2020-09-23 NOTE — TELEPHONE ENCOUNTER
Per Danette Bryan CNP, patient aware of benign breast imaging results with recommendations to follow up in 1 year for annual screening. Patient voiced an understanding of results and plan of care.

## 2022-04-19 ENCOUNTER — OFFICE VISIT (OUTPATIENT)
Dept: OBGYN CLINIC | Age: 47
End: 2022-04-19
Payer: COMMERCIAL

## 2022-04-19 VITALS
DIASTOLIC BLOOD PRESSURE: 94 MMHG | BODY MASS INDEX: 37.22 KG/M2 | WEIGHT: 218 LBS | HEIGHT: 64 IN | SYSTOLIC BLOOD PRESSURE: 150 MMHG

## 2022-04-19 DIAGNOSIS — Z01.411 ENCOUNTER FOR WELL WOMAN EXAM WITH ABNORMAL FINDINGS: Primary | ICD-10-CM

## 2022-04-19 DIAGNOSIS — N89.8 VAGINAL DISCHARGE: ICD-10-CM

## 2022-04-19 DIAGNOSIS — Z12.31 ENCOUNTER FOR SCREENING MAMMOGRAM FOR MALIGNANT NEOPLASM OF BREAST: ICD-10-CM

## 2022-04-19 PROCEDURE — 99396 PREV VISIT EST AGE 40-64: CPT | Performed by: NURSE PRACTITIONER

## 2022-04-19 RX ORDER — INSULIN DEGLUDEC INJECTION 100 U/ML
14 INJECTION, SOLUTION SUBCUTANEOUS DAILY
COMMUNITY

## 2022-04-19 RX ORDER — CLOTRIMAZOLE AND BETAMETHASONE DIPROPIONATE 10; .64 MG/G; MG/G
CREAM TOPICAL
Qty: 45 G | Refills: 1 | Status: SHIPPED | OUTPATIENT
Start: 2022-04-19

## 2022-04-19 RX ORDER — ESCITALOPRAM OXALATE 10 MG/1
TABLET ORAL
COMMUNITY
Start: 2022-04-05

## 2022-04-19 RX ORDER — SEMAGLUTIDE 1.34 MG/ML
INJECTION, SOLUTION SUBCUTANEOUS
COMMUNITY
Start: 2022-04-04

## 2022-04-19 RX ORDER — NAPROXEN 500 MG/1
TABLET ORAL
COMMUNITY
Start: 2022-04-04

## 2022-04-19 RX ORDER — FLUCONAZOLE 150 MG/1
150 TABLET ORAL ONCE
Qty: 2 TABLET | Refills: 2 | Status: SHIPPED | OUTPATIENT
Start: 2022-04-19 | End: 2022-04-19

## 2022-04-19 ASSESSMENT — PATIENT HEALTH QUESTIONNAIRE - PHQ9
1. LITTLE INTEREST OR PLEASURE IN DOING THINGS: 0
SUM OF ALL RESPONSES TO PHQ QUESTIONS 1-9: 0
2. FEELING DOWN, DEPRESSED OR HOPELESS: 0
SUM OF ALL RESPONSES TO PHQ9 QUESTIONS 1 & 2: 0
SUM OF ALL RESPONSES TO PHQ QUESTIONS 1-9: 0

## 2022-04-19 NOTE — PROGRESS NOTES
History and Physical  [address], [address] [place] [address], [address] [facility] [phone]   Fax [phone]  April YAMEL Avila  2022              47 y.o. Chief Complaint   Patient presents with    Annual Exam       Patient's last menstrual period was 2011. Primary Care Physician: Mika Warner    The patient was seen and examined. She has no chief complaint today and is here for her annual exam.  Her bowels are regular. There are no voiding complaints. She denies any bloating. She denies vaginal discharge and was counseled on STD's and the need for barrier contraception. HPI : April Kvng Garner is a 56 y.o. female     Annual exam  Hyst in  for benign disease/ endometriosis. Still has one ovary. Complaining of vaginal discharge/ white with itching for past couple of weeks. Used OTC Monistat with some relief but still has itching. Type II diabetes. On injectable insulin. Sees PCP in [facility]/ [facility]. Denies new partner.  of 24 years. Declines vaginal cultures/ STD screening. Previous hx of left benign breast biopsy in 2017 with clip placement.    ________________________________________________________________________  OB History    Para Term  AB Living   3 2 2 0 0 2   SAB IAB Ectopic Molar Multiple Live Births   0 0 0 0 0 2      # Outcome Date GA Lbr Glenn/2nd Weight Sex Delivery Anes PTL Lv   3                Birth Comments: System Generated. Please review and update pregnancy details.    2 Term    8 lb 8 oz (3.856 kg) M CS-Unspec   NORMA   1 Term    8 lb 4 oz (3.742 kg) M CS-Unspec   NORMA     Past Medical History:   Diagnosis Date    Anxiety     Endometriosis     GDM (gestational diabetes mellitus)     H/O hysterectomy for benign disease                                                                    Past Surgical History:   Procedure Laterality Date     SECTION      x2    ENTEROCELE REPAIR 12    HYSTERECTOMY  12    LAPAROSCOPY  13    lysis of adhesions    TUBAL LIGATION      w/ mirena removal     Family History   Problem Relation Age of Onset    Hypertension Mother     Breast Cancer Maternal Aunt     Cancer Neg Hx     Colon Cancer Neg Hx     Diabetes Neg Hx     Eclampsia Neg Hx     Ovarian Cancer Neg Hx      Labor Neg Hx     Spont Abortions Neg Hx     Stroke Neg Hx      Social History     Socioeconomic History    Marital status:      Spouse name: Not on file    Number of children: Not on file    Years of education: Not on file    Highest education level: Not on file   Occupational History    Not on file   Tobacco Use    Smoking status: Never Smoker    Smokeless tobacco: Never Used   Vaping Use    Vaping Use: Never used   Substance and Sexual Activity    Alcohol use: No    Drug use: No    Sexual activity: Yes     Partners: Male     Birth control/protection: Surgical   Other Topics Concern    Not on file   Social History Narrative    Not on file     Social Determinants of Health     Financial Resource Strain:     Difficulty of Paying Living Expenses: Not on file   Food Insecurity:     Worried About Running Out of Food in the Last Year: Not on file    Saleem of Food in the Last Year: Not on file   Transportation Needs:     Lack of Transportation (Medical): Not on file    Lack of Transportation (Non-Medical):  Not on file   Physical Activity:     Days of Exercise per Week: Not on file    Minutes of Exercise per Session: Not on file   Stress:     Feeling of Stress : Not on file   Social Connections:     Frequency of Communication with Friends and Family: Not on file    Frequency of Social Gatherings with Friends and Family: Not on file    Attends Confucianist Services: Not on file    Active Member of Clubs or Organizations: Not on file    Attends Club or Organization Meetings: Not on file    Marital Status: Not on file   Intimate Partner Violence:     Fear of Current or Ex-Partner: Not on file    Emotionally Abused: Not on file    Physically Abused: Not on file    Sexually Abused: Not on file   Housing Stability:     Unable to Pay for Housing in the Last Year: Not on file    Number of Places Lived in the Last Year: Not on file    Unstable Housing in the Last Year: Not on file       MEDICATIONS:  Current Outpatient Medications   Medication Sig Dispense Refill    Insulin Degludec (TRESIBA) 100 UNIT/ML SOLN Inject 14 Units into the skin      OZEMPIC, 0.25 OR 0.5 MG/DOSE, 2 MG/1.5ML SOPN INJECT 0.25MG ONCE WEEKLY AS DIRECTED 30      escitalopram (LEXAPRO) 10 MG tablet TAKE 1 TABLET BY MOUTH TWICE A DAY      naproxen (NAPROSYN) 500 MG tablet TAKE 1 TABLET BY MOUTH EVERY 12 HOURS WITH FOOD OR MILK AS NEEDED FOR 30 DAYS      fluconazole (DIFLUCAN) 150 MG tablet Take 1 tablet by mouth once for 1 dose Repeat dose in 7 days. 2 tablet 2    clotrimazole-betamethasone (LOTRISONE) 1-0.05 % cream Apply to affected area bid externally x 4 days then daily for 3 days 45 g 1     No current facility-administered medications for this visit. ALLERGIES:  Allergies as of 04/19/2022 - Fully Reviewed 04/19/2022   Allergen Reaction Noted    Ketorolac Other (See Comments) 01/09/2020       Symptoms of decreased mood absent  Symptoms of anhedonia absent    **If either question is answered in a  positive fashion then complete the PHQ9 Scoring Evaluation and make the appropriate referral**      Immunization status: stated as current, but no records available. Gynecologic History:  Menarche: 16 yo  Menopause at [address] [facility] yo     Patient's last menstrual period was 11/24/2011.     Sexually Active: Yes    STD History: No     Permanent Sterilization: Yes hysterectomy   Reversible Birth Control: No        Hormone Replacement Exposure: No      Genetic Qualified Family History of Breast, Ovarian , Colon or Uterine Cancer: No (maternal aunt with breast cancer- age 60s)     If YES see scanned worksheet. Preventative Health Testing:    Health Maintenance:  Health Maintenance Due   Topic Date Due    Hepatitis C screen  Never done    HIV screen  Never done    DTaP/Tdap/Td vaccine (1 - Tdap) Never done    Diabetes screen  Never done    Colorectal Cancer Screen  Never done    Depression Screen  09/22/2021       Date of Last Pap Smear: 3/2/2017 neg/neg  Abnormal Pap Smear History: denies  Colposcopy History:   Date of Last Mammogram: 9/22/2020 negative  Date of Last Colonoscopy: declines  Date of Last Bone Density:      ________________________________________________________________________        REVIEW OF SYSTEMS:    yes   A minimum of an eleven point review of systems was completed. Review Of Systems (11 point):  Constitutional: No fever, chills or malaise; No weight change or fatigue  Head and Eyes: No vision changes, Headache, Dizziness or trauma in last 12 months  ENT ROS: No hearing, Tinnitis, sinus or taste problems  Hematological and Lymphatic ROS:No Lymphoma, Von Willebrand's, Hemophillia or Bleeding History  Psych ROS: No Depression, Homicidal thoughts,suicidal thoughts, or anxiety  Breast ROS: No breast abnormalities or lumps  Respiratory ROS: No SOB, Pneumoniae,Cough, or Pulmonary Embolism   Cardiovascular ROS: No Chest Pain with Exertion, Palpitations, Syncope, Edema, Arrhythmia  Gastrointestinal ROS: No Indigestion, Heartburn, Nausea, vomiting, Diarrhea, Constipation,or Bowel Changes; No Bloody Stools or melena  Genito-Urinary ROS: No Dysuria, Hematuria or Nocturia. No Urinary Incontinence or Vaginal Discharge.  + type II diabetes  Musculoskeletal ROS: No Arthralgia, Arthritis,Gout,Osteoporosis or Rheumatism  Neurological ROS: No CVA, Migraines, Epilepsy, Seizure Hx, or Limb Weakness  Dermatological ROS: No Rash, Itching, Hives, Mole Changes or Cancer PHYSICAL Exam:     Constitutional:  Vitals:    04/19/22 0900   BP: (!) 150/94   Site: Left Upper Arm   Position: Sitting   Cuff Size: Medium Adult   Weight: 218 lb (98.9 kg)   Height: 5' 4\" (1.626 m)       Chaperone for Intimate Exam   Chaperone was offered and accepted as part of the rooming process.  Chaperone: Nikky          General Appearance: This  is a well Developed, well Nourished, well groomed female. Her BMI was reviewed. Nutritional decision making was discussed. Skin:  There was a Normal Inspection of the skin without rashes or lesions. There were no rashes. (Papular, Maculopapular, Hives, Pustular, Macular)     There were no lesions (Ulcers, Erythema, Abn. Appearing Nevi)            Lymphatic:  No Lymph Nodes were Palpable in the neck , axilla or groin.  0 # Of Lymph Nodes; Location ; Character [Normal]  [Shotty] [Tender] [Enlarged]     Neck and EENT:  The neck was supple. There were no masses   The thyroid was not enlarged and had no masses. Perrla, EOMI B/L, TMI B/L No Abnormalities. Throat inspected-No exudates or Masses, Nares Patent No Masses        Respiratory: The lungs were auscultated and found to be clear. There were no rales, rhonchi or wheezes. There was a good respiratory effort. Cardiovascular: The heart was in a regular rate and rhythm. . No S3 or S4. There was no murmur appreciated. Location, grade, and radiation are not applicable. Extremities: The patients extremities were without calf tenderness, edema, or varicosities. There was full range of motion in all four extremities. Pulses in all four extremities were appreciated and are 2/4. Abdomen: The abdomen was soft and non-tender. There were good bowel sounds in all quadrants and there was no guarding, rebound or rigidity.   On evaluation there was no evidence of hepatosplenomegaly and there was no costal vertebral laurel tenderness bilaterally. No hernias were appreciated. Abdominal Scars: Hyst scar    Psych: The patient had a normal Orientation to: Time, Place, Person, and Situation  There is no Mood / Affect changes    Breast:  (Chest)  normal appearance, no masses or tenderness, No nipple retraction or dimpling, No nipple discharge or bleeding, No axillary or supraclavicular adenopathy, Normal to palpation without dominant masses  Self breast exams were reviewed in detail. Literature was given. Pelvic Exam:  External genitalia: normal general appearance  Urinary system: urethral meatus normal  Vaginal: normal mucosa without prolapse or lesions  Cervix: absent  Adnexa: nonpalpable  Uterus: absent    Rectal Exam:  exam declined by patient          Musculosk:  Normal Gait and station was noted. Digits were evaluated without abnormal findings. Range of motion, stability and strength were evaluated and found to be appropriate for the patients age. ASSESSMENT:      56 y.o. Annual   Diagnosis Orders   1. Encounter for well woman exam with abnormal findings     2. Encounter for screening mammogram for malignant neoplasm of breast  CHRISTOPHER DIGITAL SCREEN W OR WO CAD BILATERAL   3. Vaginal discharge  fluconazole (DIFLUCAN) 150 MG tablet    clotrimazole-betamethasone (LOTRISONE) 1-0.05 % cream          Chief Complaint   Patient presents with    Annual Exam          Past Medical History:   Diagnosis Date    Anxiety     Endometriosis     GDM (gestational diabetes mellitus)     H/O hysterectomy for benign disease          Patient Active Problem List    Diagnosis Date Noted    H/O hysterectomy for benign disease     Anxiety     GDM (gestational diabetes mellitus)     Endometriosis      replace inactive diagnosis            Hereditary Breast, Ovarian, Colon and Uterine Cancer screening Done.           Tobacco & Secondary smoke risks reviewed; instructed on cessation and component. Counseling and education regarding her diagnosis listed below and her options regarding those diagnoses were also included in determining her time component. Diagnosis Orders   1. Encounter for well woman exam with abnormal findings     2. Encounter for screening mammogram for malignant neoplasm of breast  CHRISTOPHER DIGITAL SCREEN W OR WO CAD BILATERAL   3. Vaginal discharge  fluconazole (DIFLUCAN) 150 MG tablet    clotrimazole-betamethasone (LOTRISONE) 1-0.05 % cream        The patient had her preventative health maintenance recommendations and follow-up reviewed with her at the completion of her visit.

## 2022-05-11 ENCOUNTER — TELEPHONE (OUTPATIENT)
Dept: OBGYN CLINIC | Age: 47
End: 2022-05-11

## 2022-05-11 ENCOUNTER — HOSPITAL ENCOUNTER (OUTPATIENT)
Dept: WOMENS IMAGING | Age: 47
Discharge: HOME | End: 2022-05-13
Payer: COMMERCIAL

## 2022-05-11 DIAGNOSIS — Z12.31 ENCOUNTER FOR SCREENING MAMMOGRAM FOR MALIGNANT NEOPLASM OF BREAST: ICD-10-CM

## 2022-05-11 DIAGNOSIS — N64.89 BREAST ASYMMETRY: Primary | ICD-10-CM

## 2022-05-11 PROCEDURE — 77063 BREAST TOMOSYNTHESIS BI: CPT

## 2022-05-11 NOTE — TELEPHONE ENCOUNTER
----- Message from CLAUDIO Peraza CNP sent at 5/11/2022 12:04 PM EDT -----  Asymmetry noted in left breast noted  Recommend left breast mammogram and left breast ultrasound for further imaging. Please let patient know results and order follow up testing.

## 2022-05-17 ENCOUNTER — HOSPITAL ENCOUNTER (OUTPATIENT)
Dept: WOMENS IMAGING | Age: 47
Discharge: HOME | End: 2022-05-19
Payer: COMMERCIAL

## 2022-05-17 ENCOUNTER — TELEPHONE (OUTPATIENT)
Dept: OBGYN CLINIC | Age: 47
End: 2022-05-17

## 2022-05-17 DIAGNOSIS — N64.89 BREAST ASYMMETRY: ICD-10-CM

## 2022-05-17 DIAGNOSIS — N63.20 LEFT BREAST MASS: Primary | ICD-10-CM

## 2022-05-17 PROCEDURE — 76642 ULTRASOUND BREAST LIMITED: CPT

## 2022-05-17 PROCEDURE — G0279 TOMOSYNTHESIS, MAMMO: HCPCS

## 2022-05-17 NOTE — TELEPHONE ENCOUNTER
----- Message from CLAUDIO Posadas CNP sent at 5/17/2022 11:50 AM EDT -----  Left breast mass noted on mammogram and ultrasound  Biopsy is recommended. High suspicion for malignancy. Please let patient know and refer patient to general surgeon for left breast biopsy.

## 2022-05-19 ENCOUNTER — HOSPITAL ENCOUNTER (OUTPATIENT)
Dept: WOMENS IMAGING | Age: 47
Discharge: HOME | End: 2022-05-21
Payer: COMMERCIAL

## 2022-05-19 VITALS — SYSTOLIC BLOOD PRESSURE: 126 MMHG | HEART RATE: 88 BPM | DIASTOLIC BLOOD PRESSURE: 86 MMHG

## 2022-05-19 DIAGNOSIS — N63.20 LEFT BREAST MASS: ICD-10-CM

## 2022-05-19 PROCEDURE — 19083 BX BREAST 1ST LESION US IMAG: CPT

## 2022-05-19 PROCEDURE — 77065 DX MAMMO INCL CAD UNI: CPT

## 2022-05-19 PROCEDURE — 88377 M/PHMTRC ALYS ISHQUANT/SEMIQ: CPT

## 2022-05-19 PROCEDURE — 88360 TUMOR IMMUNOHISTOCHEM/MANUAL: CPT

## 2022-05-19 PROCEDURE — 88305 TISSUE EXAM BY PATHOLOGIST: CPT

## 2022-05-20 ENCOUNTER — TELEPHONE (OUTPATIENT)
Dept: WOMENS IMAGING | Age: 47
End: 2022-05-20

## 2022-05-20 LAB — SURGICAL PATHOLOGY REPORT: NORMAL

## 2022-05-23 ENCOUNTER — TELEPHONE (OUTPATIENT)
Dept: OBGYN CLINIC | Age: 47
End: 2022-05-23

## 2022-05-23 DIAGNOSIS — Z90.710 H/O HYSTERECTOMY FOR BENIGN DISEASE: Primary | ICD-10-CM

## 2022-05-23 DIAGNOSIS — Z17.0 MALIGNANT NEOPLASM OF CENTRAL PORTION OF LEFT BREAST IN FEMALE, ESTROGEN RECEPTOR POSITIVE (HCC): Primary | ICD-10-CM

## 2022-05-23 DIAGNOSIS — C50.112 MALIGNANT NEOPLASM OF CENTRAL PORTION OF LEFT BREAST IN FEMALE, ESTROGEN RECEPTOR POSITIVE (HCC): Primary | ICD-10-CM

## 2022-05-23 DIAGNOSIS — C50.112 MALIGNANT NEOPLASM OF CENTRAL PORTION OF LEFT FEMALE BREAST, UNSPECIFIED ESTROGEN RECEPTOR STATUS (HCC): ICD-10-CM

## 2022-05-23 NOTE — RESULT ENCOUNTER NOTE
A referral to [facility] has been sent. [facility] will contact patient for appointment.

## 2022-05-23 NOTE — TELEPHONE ENCOUNTER
Patient notified of results of left breast biopsy results- invasive well differentiated ductal carcinoma. Patient to be referred to surgeon. Patient to call office if no one calls to schedule follow up.

## 2022-05-24 DIAGNOSIS — C50.912 MALIGNANT NEOPLASM OF LEFT BREAST IN FEMALE, ESTROGEN RECEPTOR POSITIVE, UNSPECIFIED SITE OF BREAST (HCC): Primary | ICD-10-CM

## 2022-05-24 DIAGNOSIS — Z17.0 MALIGNANT NEOPLASM OF LEFT BREAST IN FEMALE, ESTROGEN RECEPTOR POSITIVE, UNSPECIFIED SITE OF BREAST (HCC): Primary | ICD-10-CM

## 2022-05-25 ENCOUNTER — TELEPHONE (OUTPATIENT)
Dept: ONCOLOGY | Age: 47
End: 2022-05-25

## 2022-05-25 NOTE — TELEPHONE ENCOUNTER
Name: Netta Avila  : 1975  MRN: [id number]    Oncology Navigation- Initial Note:    Intake-  Contact Type: Telephone    Diagnosis: Breast-malignant    Home Disposition: Lives with other who is able to assist    Patient needs and barriers to care: Nio Barriers Identified     Referral Source: Outpatient      Notes: Writer spoke with pt, introduced self/role. Discussed recent biopsy with pt. She is scheduled to see Dr Romayne Bunk on  and would like to be seen by oncology asap. Writer arranged pt to be seen  with MO,RO, and genetics. Pt given appt details. Age of first Menses: 16    Age at Menopause:  S/P partial hyst in    Number of pregnancies:  2    Miscarriages/abortions:  0   Live Births: 2  Age @ first live Birth: 33  Hx of hormone replacement: no        Years on Birth Control: ~5   Personal Hx of Breast. Ca: no       Any many female relatives with breast ca: Maternal Aunt in 66's   Hx of breast biopsy: yes    When:       Writer provided contact information. Will continue to follow.      Electronically signed by Jeferson Walker RN on 2022 at 9:39 AM

## 2022-05-25 NOTE — TELEPHONE ENCOUNTER
Writer spoke with Dr Vero Cehrry office, pt to be seen in Breast cancer clinic, not High Risk like referral shows. Writer attempted to contact pt, no answer, VM left with writers contact information asking for a return call to assist in scheduling oncology appts.

## 2022-05-27 ENCOUNTER — INITIAL CONSULT (OUTPATIENT)
Dept: ONCOLOGY | Age: 47
End: 2022-05-27
Payer: COMMERCIAL

## 2022-05-27 ENCOUNTER — TELEPHONE (OUTPATIENT)
Dept: ONCOLOGY | Age: 47
End: 2022-05-27

## 2022-05-27 ENCOUNTER — HOSPITAL ENCOUNTER (OUTPATIENT)
Dept: RADIATION ONCOLOGY | Age: 47
Discharge: HOME | End: 2022-05-27
Payer: COMMERCIAL

## 2022-05-27 VITALS
BODY MASS INDEX: 36.76 KG/M2 | HEART RATE: 100 BPM | WEIGHT: 215.3 LBS | HEIGHT: 64 IN | DIASTOLIC BLOOD PRESSURE: 82 MMHG | SYSTOLIC BLOOD PRESSURE: 158 MMHG | TEMPERATURE: 96.8 F

## 2022-05-27 VITALS
BODY MASS INDEX: 36.96 KG/M2 | TEMPERATURE: 96.8 F | SYSTOLIC BLOOD PRESSURE: 158 MMHG | WEIGHT: 215.3 LBS | DIASTOLIC BLOOD PRESSURE: 82 MMHG | HEART RATE: 100 BPM

## 2022-05-27 DIAGNOSIS — Z80.3 FAMILY HISTORY OF BREAST CANCER: ICD-10-CM

## 2022-05-27 DIAGNOSIS — Z17.0 MALIGNANT NEOPLASM OF UPPER-OUTER QUADRANT OF LEFT BREAST IN FEMALE, ESTROGEN RECEPTOR POSITIVE (HCC): Primary | ICD-10-CM

## 2022-05-27 DIAGNOSIS — C50.412 MALIGNANT NEOPLASM OF UPPER-OUTER QUADRANT OF LEFT BREAST IN FEMALE, ESTROGEN RECEPTOR POSITIVE (HCC): Primary | ICD-10-CM

## 2022-05-27 DIAGNOSIS — Z17.0 CARCINOMA OF UPPER-OUTER QUADRANT OF LEFT BREAST IN FEMALE, ESTROGEN RECEPTOR POSITIVE (HCC): ICD-10-CM

## 2022-05-27 DIAGNOSIS — C50.412 CARCINOMA OF UPPER-OUTER QUADRANT OF LEFT BREAST IN FEMALE, ESTROGEN RECEPTOR POSITIVE (HCC): ICD-10-CM

## 2022-05-27 PROCEDURE — 99205 OFFICE O/P NEW HI 60 MIN: CPT | Performed by: RADIOLOGY

## 2022-05-27 PROCEDURE — 99213 OFFICE O/P EST LOW 20 MIN: CPT | Performed by: RADIOLOGY

## 2022-05-27 PROCEDURE — 96040 PR GENETIC COUNSELING, EACH 30 MIN: CPT | Performed by: GENETIC COUNSELOR, MS

## 2022-05-27 PROCEDURE — 99245 OFF/OP CONSLTJ NEW/EST HI 55: CPT | Performed by: INTERNAL MEDICINE

## 2022-05-27 NOTE — PROGRESS NOTES
3 [facility]   Hereditary Cancer Risk Assessment     Name: Netta Avila   YOB: 1975   Date of Consultation: 5/27/22     Ms. Avila was seen at the [facility] for genetic counseling on 5/27/22. Ms. Avila was referred by Kalin Baldwin DO due to her personal and family history of breast cancer. PERSONAL HISTORY   Ms. Brendan Pérez is a 56 y.o.  female who was recently diagnosed with breast cancer. 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 Menarche at age: 14y  First child at age: 32y  Menopause at age: partial hyst in 2013  Hysterectomy: 2013  Oophorectomy: NA    FAMILY HISTORY  Ms. Brendan Pérez has one maternal aunt with breast cancer in her 76s. Her maternal grandfather had leukemia. Otherwise, she denies any additional family history of breast, ovarian, pancreatic and prostate cancer. Ms. Avila reports unknown ancestry and denies any known Ashkenazi Baptism heritage. RISK ASSESSMENT   We discussed that approximately 5-10% of cancers are due to a hereditary gene mutation which causes an increased risk for certain cancers. Hereditary cancers are typically diagnosed at younger ages (under age 46y) and occur in multiple generations of a family. Multiple individuals with the same type of cancer (example: breast or colorectal) or uncommon cancers (example: ovarian, pancreatic, male breast cancer) are also features of hereditary cancers. In summary, Ms. Avila meets the [employer] (NCCN) guidelines for genetic testing of the BRCA1/2 genes due to personal diagnosis of breast cancer under age 48 and having a maternal second degree relative with breast cancer. The NCCN guidelines also recognize that an individual's personal and/or family history may be explained by more than one inherited cancer syndrome. Thus, a multi-gene panel may be more efficient, more cost effecting, and increases the yield of detecting a hereditary mutation which would impact medical management. Given her personal and/or family history, we recommend testing for the following genes at minimum: HARRIETT, CHEK2, and PALB2. DISCUSSION  We discussed that the BRCA1/2 genes are the most common genes associated with hereditary breast, ovarian, prostate and pancreatic cancer. We also discussed that genetic testing is available for multiple other genes related to hereditary cancer. Some of these genes are known to carry a significant increased risk for several cancers including colon, breast, uterine, ovarian, stomach, and pancreatic cancer, while some of these genes are believed to have a moderate increased risk for breast and other cancers. We discussed the possibility of finding a mutation in genes with limited information to guide medical management, as well we as the possibility of identifying variants of uncertain significance (VUS). We discussed the risks, benefits, and limitations of genetic testing. Possible test results were discussed as well as potential screening and prevention strategies. Specifically, we discussed:    1) Increased breast cancer surveillance by mammogram and breast MRI as well as the option of prophylactic mastectomy  2) Possible recommendations for prophylactic oophorectomy for results which suggest an increased risk for ovarian cancer  3) Possible recommendations for prophylactic hysterectomy for results which suggest an increased risk for endometrial cancer  4) Increased colon cancer surveillance by colonoscopy screening every 1-2 years beginning by age 18-19y    Lastly, we discussed that the results of Ms. Avila's genetic testing may be beneficial in defining her risk for cancer as well as for her family members. SUMMARY & PLAN  1) Ms. Avila meets the NCCN criteria for genetic testing based on her personal and family history of breast cancer. 2) Genetic testing via a multi-gene panel was recommended and offered to Ms. Avila. 3) Ms. Avila elected to proceed with the [facility] Hereditary Cancer Gene Panel. 4) Ms. Sharlene Villa is aware that she will receive a notification from the laboratory [facility]) if the out of pocket cost for testing exceeds $100 (based on individual insurance plan) and the option to proceed with the self pay price of $249.     5) Informed consent was obtained and a blood sample was sent to [facility]. We will call Ms. Avila with results as soon as they are available. A follow up appointment may be recommended. A summary letter with results and final medical management recommendations will be sent once available. A total of 30 minutes were spent face to face with Ms. Avila and 50% of the time was spent educating and counseling. The [address] [place] [address] National Program would be glad to offer our assistance should you have any questions or concerns about this information. Please feel free to contact us at [phone]. Abdirizak Vidales.  Jaylon Mon, MS, [facility]   Licensed Genetic Counselor

## 2022-05-27 NOTE — PROGRESS NOTES
Ms. Latoya Mejia is a very pleasant 56 y.o. female with no major past health problems. . She was doing fine with routine mammogram being normal until this year where she had suspicious abnormalities in the mammogram done on 2022. Diagnostic mammogram 2022 showed area of architectural distortion at the left breast upper outer quadrant. . A biopsy was performed on 2022 and unfortunately that was positive for invasive ductal carcinoma. ER positive, TX positive, HER2/checo not amplified. The patient is seen today for further management of her breast cancer. The patient did very well after her biopsy without any complication. The patient had no symptom preceding her diagnosis of cancer. No chest pain, shortness of breath, cough, sputum or hemoptysis, no back pain or terry aches, no weight loss or decreased appetite, no fever or night sweating. No headaches, and no other complaints. GYNECOLOGICAL HISTORY  Menarche at age 16. Hysterectomy age 29, one ovary, endometriosis. +0. Age at first full term pregnancy 33. No use of HRT. PAST MEDICAL HISTORY: has a past medical history of Anxiety, Endometriosis, GDM (gestational diabetes mellitus), and H/O hysterectomy for benign disease. PAST SURGICAL HISTORY: has a past surgical history that includes Tubal ligation ();  section; Hysterectomy (13); Enterocele repair (12); laparoscopy (14); and US BREAST BIOPSY W LOC DEVICE 1ST LESION LEFT (Left, 2022). CURRENT MEDICATIONS:  has a current medication list which includes the following prescription(s): tresiba, ozempic (0.25 or 0.5 mg/dose), escitalopram, naproxen, and clotrimazole-betamethasone. ALLERGIES:  is allergic to ketorolac. FAMILY HISTORY: aunt 76s breast cancer, g father leukemia. Otherwise negative for any hematological or oncological conditions. SOCIAL HISTORY:  reports that she has never smoked.  She has never used smokeless tobacco. She reports that she does not drink alcohol and does not use drugs. REVIEW OF SYSTEMS:     · General: No weakness or fatigue. No unanticipated weight loss or decreased appetite. No fever or chills. · Eyes: No blurred vision, eye pain or double vision. · Ears: No hearing problems or drainage. No tinnitus. · Throat: No sore throat, problems with swallowing or dysphagia. · Respiratory: No cough, sputum or hemoptysis. No shortness of breath. No pleuritic chest pain. · Cardiovascular: No chest pain, orthopnea or PND. No lower extremity edema. No palpitation. · Gastrointestinal: No problems with swallowing. No abdominal pain or bloating. No nausea or vomiting. No diarrhea or constipation. No GI bleeding. · Genitourinary: No dysuria, hematuria, frequency or urgency. · Musculoskeletal: No muscle aches or pains. No limitation of movement. No back pain. No gait disturbance, No joint complaints. · Dermatologic: No skin rashes or pruritus. No skin lesions or discolorations. · Psychiatric: No depression, anxiety, or stress or signs of schizophrenia. No change in mood or affect. · Hematologic: No history of bleeding tendency. No bruises or ecchymosis. No history of clotting problems. · Infectious disease: No fever, chills or frequent infections. · Endocrine: No polydipsia or polyuria. No temperature intolerance. · Neurologic: No headaches or dizziness. No weakness or numbness of the extremities. No changes in balance, coordination,  memory, mentation, behavior. · Allergic/Immunologic: No nasal congestion or hives. No repeated infections. PHYSICAL EXAM:  The patient is not in acute distress. Vital signs: Blood pressure (!) 158/82, pulse 100, temperature 96.8 °F (36 °C), temperature source Temporal, height 5' 4\" (1.626 m), weight 215 lb 4.8 oz (97.7 kg), last menstrual period 11/24/2011, not currently breastfeeding. HEENT:  Eyes are normal. Ears, nose and throat are normal.  Neck: Supple.   No mass    COMPARISON:  Previous mammographic imaging May 17, 2022, May 11, 2022, September 22, 2020,  April 2, 2019, October 11, 2017. TECHNIQUE:  A timeout was performed to confirm patient identification and site of  procedure. Risks, benefits, and alternatives of the procedure were discussed. Informed written consent was obtained. Dynamic grayscale imaging with color flow Doppler was used for evaluation of  the area of concern at 2 o'clock in the left breast..    The biopsy site was prepped in standard fashion. 2% lidocaine was used for  local anesthesia, with 2% lidocaine plus epinephrine used for deeper  anesthesia. A small skin incision was made. A 12-gauge, vacuum-assisted  Enspire biopsy needle was advanced under sonographic guidance via a lateral  medial approach. Six cores were obtained and the needle was removed. A MediGain butterfly biopsy clip was placed at the biopsy site under  ultrasound guidance. Pressure was applied for hemostasis. The patient  tolerated the procedure well with no immediate complications. POSTPROCEDURE MAMMOGRAM:    ML and CC views of the left breast were obtained immediately following the  procedure. The biopsy clip is in the correct location with respect to the  targeted site. There is no evidence of biopsy clip migration from the biopsy  site. A different type of biopsy marker clip is also present in the  upper-outer quadrant of the left breast approximately 2 cm from the biopsy  marker clip placed today. Post biopsy clip placement imaging performed in a separate room. Impression: Technically successful ultrasound guided core biopsy of an area of concern in  the left breast with clip marker placement as described above. BIRADS:  ZW - Pathology pending.   US BREAST BIOPSY W LOC DEVICE 1ST LESION LEFT  Addendum: ADDENDUM:   Radiology/pathology correlation: Pathology results from an  ultrasound-guided   biopsy performed on a mass in the 2 o'clock left breast demonstrates   invasive, well differentiated ductal carcinoma, ER/KY positive. The  result   is malignant and concordant. Surgical and oncologic consultation are   recommended. Given patient's young age and dense breast tissue,  preoperative   contrast-enhanced breast MRI is recommended to evaluate extent of  disease. BI-RADS 6     BIRADS - CATEGORY 6     Known Biopsy-Proven Cancer. Appropriate action should be taken. OVERALL ASSESSMENT - KNOWN BIOPSY PROVEN MALIGNANCY. Narrative: EXAMINATION:  ULTRASOUND-GUIDED LEFT BREAST BIOPSY WITH VACUUM-ASSIST    ULTRASOUND-GUIDED CLIP PLACEMENT    POSTPROCEDURE DIGITAL MAMMOGRAM    5/19/2022    HISTORY:  ORDERING SYSTEM PROVIDED HISTORY: Left breast mass  TECHNOLOGIST PROVIDED HISTORY:  left breast mass    COMPARISON:  Previous mammographic imaging May 17, 2022, May 11, 2022, September 22, 2020,  April 2, 2019, October 11, 2017. TECHNIQUE:  A timeout was performed to confirm patient identification and site of  procedure. Risks, benefits, and alternatives of the procedure were discussed. Informed written consent was obtained. Dynamic grayscale imaging with color flow Doppler was used for evaluation of  the area of concern at 2 o'clock in the left breast..    The biopsy site was prepped in standard fashion. 2% lidocaine was used for  local anesthesia, with 2% lidocaine plus epinephrine used for deeper  anesthesia. A small skin incision was made. A 12-gauge, vacuum-assisted  Enspire biopsy needle was advanced under sonographic guidance via a lateral  medial approach. Six cores were obtained and the needle was removed. A SpinalMotionMARK butterfly biopsy clip was placed at the biopsy site under  ultrasound guidance. Pressure was applied for hemostasis. The patient  tolerated the procedure well with no immediate complications. POSTPROCEDURE MAMMOGRAM:    ML and CC views of the left breast were obtained immediately following the  procedure.  The biopsy clip is in the correct location with respect to the  targeted site. There is no evidence of biopsy clip migration from the biopsy  site. A different type of biopsy marker clip is also present in the  upper-outer quadrant of the left breast approximately 2 cm from the biopsy  marker clip placed today. Post biopsy clip placement imaging performed in a separate room. Impression: Technically successful ultrasound guided core biopsy of an area of concern in  the left breast with clip marker placement as described above. BIRADS:  ZW - Pathology pending. Core needle biopsy 5/19/2022 of left breast 2 o'clock position:  Final Diagnosis   LEFT BREAST, 2:00, ULTRASOUND-GUIDED CORE NEEDLE BIOPSIES:   - INVASIVE, WELL DIFFERENTIATED DUCTAL CARCINOMA. - ESTROGEN RECEPTOR POSITIVE. - PROGESTERONE RECEPTOR POSITIVE.   - SEE COMMENT. IMPRESSION:   Well differentiated invasive ductal carcinoma of the left breast upper outer quadrant. ER positive, AL positive, HER2/checo not amplified. PLAN: For more than 60 minutes of face to face discussion, I explained to the patient and her companions the nature of breast cancer, staging, prognosis and treatment. The pathology results were reviewed and explained in layman language. Patient seems to present with early stage malignancy with good biological markers. The exact size is not very clear from the mammogram ultrasound. We will do breast MRI for further characterization. Patient will need to have surgery with lumpectomy followed by radiation therapy versus mastectomy. She will be seen by radiation oncology today. Patient will be candidate for endocrine therapy due to positive hormone receptor but likely she will need Oncotype DX after the surgery to look further at her risk of recurrence and to decide about role of chemotherapy treatment. This treatment modalities were explained to the patient in details and she understands.   Patient will be seen by radiation oncology and by surgery and I will see her again after her surgery to discuss adjuvant treatment. Patient's questions were answered to the best of her satisfaction and she verbalized full understanding and agreement.

## 2022-05-27 NOTE — TELEPHONE ENCOUNTER
Breast MRI soon  Will see radiation oncology today  RV after surgery    MRI scheduled 6/7/22 @ 1pm    RO after MO today    RV TBD, writer will follow    PT was given AVS and appt schedule    Electronically signed by Calin Henriquez on 5/27/2022 at 2:17 PM REFERRAL INFORMATION:    Referring Provider:  Dr. Loco Puckett    Referring Clinic:  [facility]    Reason for Visit/Diagnosis: Abdominal pain, Diarrhea, Weight loss     FUTURE VISIT INFORMATION:    Appointment Date: 5/11/2021    Appointment Time: 4:20 PM      NOTES STATUS DETAILS   OFFICE NOTE from Referring Provider Internal 3/19/2021 Office visit with Dr. Puckett   OFFICE NOTE from Other Specialist N/A    HOSPITAL DISCHARGE SUMMARY/  ED VISITS N/A    OPERATIVE REPORT N/A    MEDICATION LIST Internal         ENDOSCOPY  N/A    COLONOSCOPY N/A    ERCP N/A    EUS N/A    STOOL TESTING N/A    PERTINENT LABS Internal    PATHOLOGY REPORTS (RELATED) N/A    IMAGING (CT, MRI, EGD, MRCP, Small Bowel Follow Through/SBT, MR/CT Enterography) N/A

## 2022-05-27 NOTE — TELEPHONE ENCOUNTER
Name: Netta Avila  : 1975  MRN: [id number]    Oncology Navigation Follow-Up Note    Contact Type: Med/Onc    Notes: Writer met with pt during Dr Kathy Pimentel appt. Pt given new pt welcome packet. Pt will be scheduled for MRI. Pt also requested to be seen by [address] [place] [address] as she wants all of her providers to be in one system. Writer spoke with Sarah Rowan in Dr Melany Graham office who made pt consultation appt for next week. Denied further concerns/barriers at this time. Confirmed patient has writer's contact information. Will continue to follow.      Electronically signed by Dorothy Barksdale RN on 2022 at 10:03 AM

## 2022-05-31 ENCOUNTER — OFFICE VISIT (OUTPATIENT)
Dept: SURGERY | Age: 47
End: 2022-05-31
Payer: COMMERCIAL

## 2022-05-31 VITALS
RESPIRATION RATE: 12 BRPM | OXYGEN SATURATION: 100 % | HEIGHT: 64 IN | WEIGHT: 215 LBS | HEART RATE: 80 BPM | BODY MASS INDEX: 36.7 KG/M2

## 2022-05-31 DIAGNOSIS — C50.412 CARCINOMA OF UPPER-OUTER QUADRANT OF LEFT BREAST IN FEMALE, ESTROGEN RECEPTOR POSITIVE (HCC): Primary | ICD-10-CM

## 2022-05-31 DIAGNOSIS — Z17.0 CARCINOMA OF UPPER-OUTER QUADRANT OF LEFT BREAST IN FEMALE, ESTROGEN RECEPTOR POSITIVE (HCC): Primary | ICD-10-CM

## 2022-05-31 PROCEDURE — 99205 OFFICE O/P NEW HI 60 MIN: CPT | Performed by: SURGERY

## 2022-05-31 NOTE — LETTER
[facility]  Surgical Specialties - General Surgery  [address]  [place], \[facility] [place] [facility]\[address]  Phone: [phone]  Fax: [phone]      22    Patient: Netta Avila  MRN: [id number]  : 1975  Date of visit: 2022    Dear Afsaneh Fairchild: Thank you for requesting consultation for Netta Avila in regards to evaluation for new diagnosis of left breast cancer. Below are the relevant portions of my assessment and plan of care. If you have questions, please do not hesitate to call me. I look forward to following Netta Avila along with you.     Sincerely,        Ana Cristina Graff, DO

## 2022-05-31 NOTE — PROGRESS NOTES
[id number] [address] [facility] Surgery   History & Physical  Virgilio Andres DO  Pt Name: Kennedy Harvey  MRN: [id number]  YOB: 1975  Date of evaluation: 2022  Primary Care Physician: Viji Duran    Chief Complaint:     Left breast invasive ductal CA, 1.18cm, Grade 1  ER/IL positive  HER2 negative  ECOG 0  Clin prog stage 1A      SUBJECTIVE:    History of Present Illness: This is a 56 y.o.  female who presents for evaluation for the above. Age of Menarche: 16    Age of 1st live Birth (zero if no live births): 33    GTPAL: 2 preg, 2 births    History of contraception use: ~5yrs ocp    LMP or approximate date of menopause: partial hysterectomy     Number of previous breast biopsies: 1      Any with atypical pathology: no    Personal History of other Cancers: none    Family History of Cancer: Maternal aunt with breast CA at age 76s      Chart review performed to add information to the HPI: Yes    Past Medical History   has a past medical history of Anxiety, Carcinoma of upper-outer quadrant of left breast in female, estrogen receptor positive ([address] Endometriosis, GDM (gestational diabetes mellitus), and H/O hysterectomy for benign disease. Past Surgical History   has a past surgical history that includes Tubal ligation ();  section; Hysterectomy (12); Enterocele repair (12); laparoscopy (13); and US BREAST BIOPSY W LOC DEVICE 1ST LESION LEFT (Left, 2022). Family History  family history includes Breast Cancer in her maternal aunt; Hypertension in her mother. Social History  Tobacco use:  reports that she has never smoked. She has never used smokeless tobacco.  Alcohol use:  reports no history of alcohol use. Drug use:  reports no history of drug use.       Medications  Current Medications:   Current Outpatient Medications   Medication Sig Dispense Refill    Insulin Degludec (TRESIBA) 100 UNIT/ML SOLN Inject 14 Units into the skin      OZEMPIC, 0.25 OR 0.5 MG/DOSE, 2 MG/1.5ML SOPN INJECT 0.25MG ONCE WEEKLY AS DIRECTED 30      escitalopram (LEXAPRO) 10 MG tablet TAKE 1 TABLET BY MOUTH TWICE A DAY      naproxen (NAPROSYN) 500 MG tablet TAKE 1 TABLET BY MOUTH EVERY 12 HOURS WITH FOOD OR MILK AS NEEDED FOR 30 DAYS      clotrimazole-betamethasone (LOTRISONE) 1-0.05 % cream Apply to affected area bid externally x 4 days then daily for 3 days 45 g 1     No current facility-administered medications for this visit. Home Medications:   Prior to Admission medications    Medication Sig Start Date End Date Taking? Authorizing Provider   Insulin Degludec (TRESIBA) 100 UNIT/ML SOLN Inject 14 Units into the skin   Yes Historical Provider, MD   OZEMPIC, 0.25 OR 0.5 MG/DOSE, 2 MG/1.5ML SOPN INJECT 0.25MG ONCE WEEKLY AS DIRECTED 30 4/4/22  Yes Historical Provider, MD   escitalopram (LEXAPRO) 10 MG tablet TAKE 1 TABLET BY MOUTH TWICE A DAY 4/5/22  Yes Historical Provider, MD   naproxen (NAPROSYN) 500 MG tablet TAKE 1 TABLET BY MOUTH EVERY 12 HOURS WITH FOOD OR MILK AS NEEDED FOR 30 DAYS 4/4/22  Yes Historical Provider, MD   clotrimazole-betamethasone (Thomasenia Rist) 1-0.05 % cream Apply to affected area bid externally x 4 days then daily for 3 days 4/19/22  Yes CLAUDIO Long - CNP       Allergies  Ketorolac      Review of Systems:  General: Denies any fever, chills. Eyes: Denies any changes in vision, diplopia or eye pain  Ears, Nose, Mouth: Denies changes in hearing/tinnitus or drainage from ears, no rhinorrhea or bloody nose, no difficulty chewing  Throat: no difficulty swallowing, no throat pain  Respiratory: Denies any shortness of breath or cough. Cardiac: Denies any chest pain, palpitations, claudication or edema. Gastrointestinal: Denies any melena, hematochezia, hematemesis or pyrosis. Genitourinary: Denies any frequency, urgency, hesitancy or incontinence.   Musculoskeletal: Denies worsening muscle weakness or recent trauma  Skin: Denies rashes or Suspicious Abnormality. Biopsy should be considered at this time. OVERALL ASSESSMENT - SUSPICIOUS A letter of notification will be sent to the patient regarding the results. My findings and recommendations were discussed with the patient at the time of service. A representative from the radiology department will be contacting your office and assisting the patient in getting appropriate follow-up. MAMMOGRAM POST BX CLIP PLACEMENT LEFT    Addendum Date: 5/23/2022    ADDENDUM: Radiology/pathology correlation: Pathology results from an ultrasound-guided biopsy performed on a mass in the 2 o'clock left breast demonstrates invasive, well differentiated ductal carcinoma, ER/AR positive. The result is malignant and concordant. Surgical and oncologic consultation are recommended. Given patient's young age and dense breast tissue, preoperative contrast-enhanced breast MRI is recommended to evaluate extent of disease. BI-RADS 6 BIRADS - CATEGORY 6 Known Biopsy-Proven Cancer. Appropriate action should be taken. OVERALL ASSESSMENT - KNOWN BIOPSY PROVEN MALIGNANCY. Result Date: 5/23/2022  EXAMINATION: ULTRASOUND-GUIDED LEFT BREAST BIOPSY WITH VACUUM-ASSIST ULTRASOUND-GUIDED CLIP PLACEMENT POSTPROCEDURE DIGITAL MAMMOGRAM 5/19/2022 HISTORY: ORDERING SYSTEM PROVIDED HISTORY: Left breast mass TECHNOLOGIST PROVIDED HISTORY: left breast mass COMPARISON: Previous mammographic imaging May 17, 2022, May 11, 2022, September 22, 2020, April 2, 2019, October 11, 2017. TECHNIQUE: A timeout was performed to confirm patient identification and site of procedure. Risks, benefits, and alternatives of the procedure were discussed. Informed written consent was obtained. Dynamic grayscale imaging with color flow Doppler was used for evaluation of the area of concern at 2 o'clock in the left breast.. The biopsy site was prepped in standard fashion.   2% lidocaine was used for local anesthesia, with 2% lidocaine plus epinephrine used for deeper anesthesia. A small skin incision was made. A 12-gauge, vacuum-assisted Enspire biopsy needle was advanced under sonographic guidance via a lateral medial approach. Six cores were obtained and the needle was removed. A [employer] butterfly biopsy clip was placed at the biopsy site under ultrasound guidance. Pressure was applied for hemostasis. The patient tolerated the procedure well with no immediate complications. POSTPROCEDURE MAMMOGRAM: ML and CC views of the left breast were obtained immediately following the procedure. The biopsy clip is in the correct location with respect to the targeted site. There is no evidence of biopsy clip migration from the biopsy site. A different type of biopsy marker clip is also present in the upper-outer quadrant of the left breast approximately 2 cm from the biopsy marker clip placed today. Post biopsy clip placement imaging performed in a separate room. Technically successful ultrasound guided core biopsy of an area of concern in the left breast with clip marker placement as described above. BIRADS: ZW - Pathology pending. US BREAST BIOPSY W LOC DEVICE 1ST LESION LEFT    Addendum Date: 5/23/2022    ADDENDUM: Radiology/pathology correlation: Pathology results from an ultrasound-guided biopsy performed on a mass in the 2 o'clock left breast demonstrates invasive, well differentiated ductal carcinoma, ER/UT positive. The result is malignant and concordant. Surgical and oncologic consultation are recommended. Given patient's young age and dense breast tissue, preoperative contrast-enhanced breast MRI is recommended to evaluate extent of disease. BI-RADS 6 BIRADS - CATEGORY 6 Known Biopsy-Proven Cancer. Appropriate action should be taken. OVERALL ASSESSMENT - KNOWN BIOPSY PROVEN MALIGNANCY.      Result Date: 5/23/2022  EXAMINATION: ULTRASOUND-GUIDED LEFT BREAST BIOPSY WITH VACUUM-ASSIST ULTRASOUND-GUIDED CLIP PLACEMENT POSTPROCEDURE DIGITAL MAMMOGRAM 5/19/2022 HISTORY: ORDERING SYSTEM PROVIDED HISTORY: Left breast mass TECHNOLOGIST PROVIDED HISTORY: left breast mass COMPARISON: Previous mammographic imaging May 17, 2022, May 11, 2022, September 22, 2020, April 2, 2019, October 11, 2017. TECHNIQUE: A timeout was performed to confirm patient identification and site of procedure. Risks, benefits, and alternatives of the procedure were discussed. Informed written consent was obtained. Dynamic grayscale imaging with color flow Doppler was used for evaluation of the area of concern at 2 o'clock in the left breast.. The biopsy site was prepped in standard fashion. 2% lidocaine was used for local anesthesia, with 2% lidocaine plus epinephrine used for deeper anesthesia. A small skin incision was made. A 12-gauge, vacuum-assisted Enspire biopsy needle was advanced under sonographic guidance via a lateral medial approach. Six cores were obtained and the needle was removed. A "[employer] butterfly biopsy clip was placed at the biopsy site under ultrasound guidance. Pressure was applied for hemostasis. The patient tolerated the procedure well with no immediate complications. POSTPROCEDURE MAMMOGRAM: ML and CC views of the left breast were obtained immediately following the procedure. The biopsy clip is in the correct location with respect to the targeted site. There is no evidence of biopsy clip migration from the biopsy site. A different type of biopsy marker clip is also present in the upper-outer quadrant of the left breast approximately 2 cm from the biopsy marker clip placed today. Post biopsy clip placement imaging performed in a separate room. Technically successful ultrasound guided core biopsy of an area of concern in the left breast with clip marker placement as described above. BIRADS: ZW - Pathology pending.      [facility] DIGITAL SCREEN BILATERAL    Result Date: 5/11/2022  EXAMINATION: SCREENING DIGITAL BILATERAL MAMMOGRAM WITH TOMOSYNTHESIS, 5/11/2022 TECHNIQUE: Screening mammography was performed with tomosynthesis including MLO and CC views of the bilateral breasts. Computer aided detection was used for the interpretation of this exam. COMPARISON: Mammographic imaging September 22 2020, April 2, 2019, October 11, 2017. HISTORY: Screening. FINDINGS: Breast parenchyma is heterogeneously dense, which could easily obscure an underlying lesion. Biopsy marker clip present in the left breast.  No evidence of abnormal calcification, skin thickening or skin retraction. No evidence of abnormality right breast. Asymmetric density left breast upper outer quadrant 7-8 cm from the nipple with suggestion of possible spiculation/architectural distortion, for which diagnostic mL and coned-down compression views are recommended. If this persists, ultrasound will be necessary for further evaluation. Area of concern upper-outer quadrant left breast for which diagnostic imaging is recommended. BIRADS: BIRADS - CATEGORY 0 Incomplete: Needs Additional Imaging Evaluation OVERALL ASSESSMENT - INCOMPLETE:NEED ADDITIONAL IMAGING EVALUATION. [facility] DIGITAL DIAGNOSTIC UNILATERAL LEFT    Result Date: 5/17/2022  EXAMINATION: DIAGNOSTIC DIGITAL LEFT BREAST MAMMOGRAM WITH TOMOSYNTHESIS; TARGETED ULTRASOUND OF THE LEFT BREAST, 5/17/2022 8:25 am TECHNIQUE: Diagnostic mammography of the left breast was performed with tomosynthesis. 2D standard and 3D tomosynthesis combination imaging performed through the left breast.  Computer aided detection was utilized in the interpretation of this exam.; Target ultrasound of the left breast was performed. Views: Left CC Spot, MLO Spot, ML, michael. COMPARISON: Bilateral screening mammography May 11, 2022, diagnostic mammogram with left ultrasound September 22, 2020, mammography with left breast ultrasound April 2, 2019, left breast biopsy April 6, 2017, screening mammogram March 14, 2017.  HISTORY: ORDERING SYSTEM PROVIDED HISTORY: Breast asymmetry TECHNOLOGIST PROVIDED HISTORY: Is the patient pregnant?->No FINDINGS: Again apparent is an area of architectural distortion with spiculation located approximately 7-8 cm from the left nipple in the upper-outer quadrant. This is located approximately 2.5 cm from the biopsy clip placed at the time of a previous left breast biopsy. ULTRASOUND: At 2 o'clock and 8 cm from the nipple was a heterogeneous mass with architectural distortion and taller than wide. This is very suspicious for neoplasia, and biopsy is recommended. Suspicious area of architectural distortion in the left breast upper outer quadrant for which ultrasound-guided biopsy is recommended. Dr. Mikayla Palacio personally spoke with this patient at the conclusion of her studies today, and she expressed understanding. BIRADS: BIRADS - CATEGORY 4C High suspicion for malignancy. Suspicious Abnormality. Biopsy should be considered at this time. OVERALL ASSESSMENT - SUSPICIOUS A letter of notification will be sent to the patient regarding the results. My findings and recommendations were discussed with the patient at the time of service. A representative from the radiology department will be contacting your office and assisting the patient in getting appropriate follow-up. DIAGNOSES:   Diagnosis Orders   1. Carcinoma of upper-outer quadrant of left breast in female, estrogen receptor positive ([facility] [address]             PLAN:  · We had a long discussion regarding surgical treatment for the patient's breast disease, this includes nonoperative observation, breast conservation therapy, mastectomy with SLNB and modified radical mastectomy, options for reconstruction were also covered. We discussed the similarities in risk reduction and overall mortality with these surgical options as well. ·   · At this time patient would like to pursue left breast lumpectomy with SLNB.    · Awaiting MRI and genetic testing results  · All questions were answered during this visit, I advised the patient to call the clinic or return to clinic sooner should any issues/concerns arise. Greater than 60 min were spent on preparing to see this patient as well as counseling this patient.       Electronically signed by Emily Dwyer DO on 5/31/2022 at 1:36 PM

## 2022-05-31 NOTE — CONSULTS
[address]            Radiation Oncology          [facility]          [place], [address]        [facility], [facility] [phone]        F: [phone]       [place] [address] [place] [address]       Radiation Oncology   [address] [facility], [address] [place] [address], INC.: [phone]       F: [phone]       [employer]        2022    Patient: Netta Avila   YOB: 1975       Dear Dr Bui Foil: Thank you for referring Netta Avila to me for evaluation. Below are the relevant portions of my assessment and plan of care. If you have questions, please do not hesitate to call me. I look forward to following this patient along with you.      Sincerely,    Electronically signed by Felipe Powers MD on 22 at 12:10 PM EDT    CC: Patient Care Team:  Mika Warner as PCP - General (Family Medicine)  Loretta Mccarthy DO as Consulting Physician (Obstetrics & Gynecology)  Kiersten Aparicio RN as Nurse Navigator (Oncology)  ------------------------------------------------------------------------------------------------------------------------------------------------------------------------------------------      RADIATION ONCOLOGY NEW PATIENT VISIT:    Date of Service: 2022    Location:  [facility] Radiation Oncology,   [address], [address]   [phone]     Patient ID:   Netta Avila  : 1975   MRN: [id number]    CHIEF COMPLAINT: \"Left breast cancer\"    DIAGNOSIS:  Cancer Staging  Carcinoma of upper-outer quadrant of left breast in female, estrogen receptor positive ([facility] [address]  Staging form: Breast, AJCC 8th Edition  - Clinical stage from 2022: Stage IA (cT1, cN0, cM0, G1, ER+, OR+, HER2-) - Signed by Felipe Powers MD on 2022      HISTORY OF PRESENT ILLNESS:   April Kvng Garner is a 56 y.o. female with a history of endometriosis and hysterectomy at age 29 presented after having an abnormal screening mammogram on May 11, 2022 which showed abnormality in the left upper outer right breast.  Patient underwent a ultrasound-guided biopsy and was found to have a well differentiated invasive ductal carcinoma ER/TN positive HER2 negative on May 19, 2022. Patient therefore given her new diagnosis has been referred to us today to discuss her treatment options. Patient did meet with medical oncology who has recommended genetic testing given her young age. She is also been recommended undergo an MRI of the breast for further characterization of her lesion for surgical planning. Patient will also be seeing a surgeon at [facility] to discuss treatment options with lumpectomy or mastectomy with reconstruction. Patient otherwise is doing well with no acute complaints. She denies any headaches, dizziness, chest pain, shortness of breath, abdominal pain, nausea, diarrhea, weight loss, bony pain, swelling, fatigue, or bleeding.       OBGYN HISTORY: (Breast/GYN only)    menarche 16 menopause 29 (hyst)  Oral contraceptive use: NA  Hormone replacement therapy use: NA       PRIOR RADIATION HISTORY:  No prior history of radiation therapy     PACEMAKER: None     PAST MEDICAL HISTORY:  Past Medical History:   Diagnosis Date    Anxiety     Endometriosis     GDM (gestational diabetes mellitus)     H/O hysterectomy for benign disease        PAST SURGICAL HISTORY:  Past Surgical History:   Procedure Laterality Date     SECTION      x2    ENTEROCELE REPAIR  12    HYSTERECTOMY  12    LAPAROSCOPY  13    lysis of adhesions    TUBAL LIGATION      w/ mirena removal    US BREAST NEEDLE BIOPSY LEFT Left 2022    US BREAST NEEDLE BIOPSY LEFT 2022 [facility]       MEDICATIONS:    Current Outpatient Medications:     Insulin Degludec (TRESIBA) 100 UNIT/ML SOLN, Inject 14 Units into the skin, Disp: , Rfl:     OZEMPIC, 0.25 OR 0.5 MG/DOSE, 2 MG/1.5ML SOPN, INJECT 0.25MG ONCE WEEKLY AS DIRECTED 30, Disp: , Rfl:     escitalopram (LEXAPRO) 10 MG tablet, TAKE 1 TABLET BY MOUTH TWICE A DAY, Disp: , Rfl:     naproxen (NAPROSYN) 500 MG tablet, TAKE 1 TABLET BY MOUTH EVERY 12 HOURS WITH FOOD OR MILK AS NEEDED FOR 30 DAYS, Disp: , Rfl:     clotrimazole-betamethasone (LOTRISONE) 1-0.05 % cream, Apply to affected area bid externally x 4 days then daily for 3 days, Disp: 45 g, Rfl: 1    ALLERGIES:   Allergies   Allergen Reactions    Ketorolac Other (See Comments)       SOCIAL HISTORY:  Social History     Socioeconomic History    Marital status:      Spouse name: Not on file    Number of children: Not on file    Years of education: Not on file    Highest education level: Not on file   Occupational History    Not on file   Tobacco Use    Smoking status: Never Smoker    Smokeless tobacco: Never Used   Vaping Use    Vaping Use: Never used   Substance and Sexual Activity    Alcohol use: No    Drug use: No    Sexual activity: Yes     Partners: Male     Birth control/protection: Surgical   Other Topics Concern    Not on file   Social History Narrative    Not on file     Social Determinants of Health     Financial Resource Strain:     Difficulty of Paying Living Expenses: Not on file   Food Insecurity:     Worried About Running Out of Food in the Last Year: Not on file    Saleem of Food in the Last Year: Not on file   Transportation Needs:     Lack of Transportation (Medical): Not on file    Lack of Transportation (Non-Medical):  Not on file   Physical Activity:     Days of Exercise per Week: Not on file    Minutes of Exercise per Session: Not on file   Stress:     Feeling of Stress : Not on file   Social Connections:     Frequency of Communication with Friends and Family: Not on file    Frequency of Social Gatherings with Friends and Family: Not on file    Attends Bahai Services: Not on file    Active Member of Clubs or Organizations: Not on file    Attends Club or Organization Meetings: Not on file    Marital Status: Not on file   Intimate Partner Violence:     Fear of Current or Ex-Partner: Not on file    Emotionally Abused: Not on file    Physically Abused: Not on file    Sexually Abused: Not on file   Housing Stability:     Unable to Pay for Housing in the Last Year: Not on file    Number of [place] in the Last Year: Not on file    Unstable Housing in the Last Year: Not on file       FAMILY HISTORY:  Family History   Problem Relation Age of Onset    Hypertension Mother     Breast Cancer Maternal Aunt     Cancer Neg Hx     Colon Cancer Neg Hx     Diabetes Neg Hx     Eclampsia Neg Hx     Ovarian Cancer Neg Hx      Labor Neg Hx     Spont Abortions Neg Hx     Stroke Neg Hx        REVIEW OF SYSTEMS:    GENERAL/CONSTITUTIONAL: The patient denies fever, fatigue, weakness, weight gain or weight loss. HEENT: The patient denies pain, redness, loss of vision, double or blurred vision. The patient denies ringing in the ears, loss of hearing, hoarseness, trouble swallowing, or painful swallowing. CARDIOVASCULAR: The patient denies chest pain, irregular heartbeats, sudden changes in heartbeat or palpitation. RESPIRATORY: The patient denies shortness of breath, cough, hemoptysis. GASTROINTESTINAL: The patient denies nausea, vomiting, diarrhea, constipation, blood in the stools. GENITOURINARY: The patient denies difficult urination, pain or burning with urination, blood in the urine. MUSCULOSKELETAL: The patient denies arm, buttock, thigh or calf cramps. No joint or muscle pain. SKIN: The patient denies easy bruising, skin redness, skin rash, hives. NEUROLOGIC: The patient denies headache, dizziness, fainting, muscle spasm, loss of consciousness. ENDOCRINE: The patient denies intolerance to hot or cold temperature, flushing. HEMATOLOGIC/LYMPHATIC: The patient denies anemia, bleeding tendency or clotting tendency.   ALLERGIC/IMMUNOLOGIC: The patient denies rhinitis, asthma, skin sensitivity. PYSCHOLOGIC: The patient denies any depression, anxiety, or confusion. A full 14 point review of systems was performed and assessed and found to be negative except as noted above. PHYSICAL EXAMINATION:    CHAPERONE: Family/friend/companieon Present    ECO Asymptomatic    VITAL SIGNS: BP (!) 158/82   Pulse 100   Temp 96.8 °F (36 °C) (Temporal)   Wt 215 lb 4.8 oz (97.7 kg)   LMP 2011   BMI 36.96 kg/m²   GENERAL:  General appearance is that of a well-nourished, well-developed in no apparent distress. HEENT: Normocephalic, atraumatic, EOMI, moist mucosa, no erythema. NECK:  No adenopathy or a palpable thyroid mass, trachea is midline. LYMPHATICS: No cervical, supraclavicular, or axillary adenopathy. HEART:  Normal rate and regular rhythm, normal heart sounds. LUNGS:  Pulmonary effort normal. Normal breath sounds. ABDOMEN:  Soft, nontender, non distended. EXTREMITIES:  No edema. No calf tenderness. MSK:  No spinal tenderness. Normal ROM. NEUROLOGICAL: Alert and oriented. Strength and sensation intact bilaterally. No focal deficits. PSYCH: Mood normal, behavior normal.  SKIN: No erythema, no desquamation. BREAST: Deferred       LABS:  No results found for: WBC, NEUTROABS, HGB, PLT  No results found for: , CEA  No results found for:   No results found for: PSA      IMAGIN22 Mammogram  Impression   Area of concern upper-outer quadrant left breast for which diagnostic imaging   is recommended.       BIRADS:   BIRADS - CATEGORY 0     PATHOLOGY:  22 L Breast Biopsy  Final Diagnosis   LEFT BREAST, 2:00, ULTRASOUND-GUIDED CORE NEEDLE BIOPSIES:   - INVASIVE, WELL DIFFERENTIATED DUCTAL CARCINOMA. - ESTROGEN RECEPTOR POSITIVE. - PROGESTERONE RECEPTOR POSITIVE.   - SEE COMMENT.        ASSESSMENT AND PLAN:   April L Fernando Wells is a 56 y.o. female with a Cancer Staging  Carcinoma of upper-outer quadrant of left breast in female, estrogen receptor positive ([facility]  Staging form: Breast, AJCC 8th Edition  - Clinical stage from 5/19/2022: Stage IA (cT1, cN0, cM0, G1, ER+, CO+, HER2-) - Signed by Bridgette Esquivel MD on 5/31/2022     We reviewed with the patient and family the testing that has been done so far, including imaging and pathology results. We also reviewed their staging based on the testing that as been done and the recommendations per the NCCN guidelines. We discussed the options of treatment, including surgery, chemotherapy, and radiation, and the rationale of why radiation therapy would be indicated for this diagnosis. We also discussed that they have the option to not pursue our recommended treatment as well. Patient is young and she has been recommended to undergo genetic testing as well as MRI for further characterization of her lesion. Patient will likely be a good candidate for lumpectomy followed by adjuvant radiation however she will await her genetic testing for determination of her surgical plans. Patient was also offered the option of mastectomy with reconstruction. Did meet with medical oncology as well to discuss adjuvant endocrine therapy and the potential of chemotherapy. We reviewed the logistics of radiation treatment planning, including a CT Simulation session, as well as daily treatments for approximately 4 weeks. With regards to radiation to the left breast, I discussed the possible short-term side effects of fatigue, skin irritation (causing redness, dryness, or peeling), swelling and tenderness of the left breast, tiredness, low blood counts (causing infection or bleeding) and hair loss in treated area.   Possible long-term side effects discussed included change in the cosmetic appearance of the left breast (change in shape, size, and texture of the breast), hyper/hypo-pigmentation of the skin, limited range of motion of the left shoulder, scarring of the lung (causing shortness of breath and cough), damage to the heart, swelling of the left arm i.e. lymphedema (causing pain), damage to the nerves (causing numbness, weakness, or paralysis) and rib fracture. We discussed the use of deep inspirative breath holding techniques during planning and treatment to help reduce the risk of toxicities to the heart. After ample time to review the patient's questions, patient will get a MRI done for additional imaging. She will follow-up after her genetic test results with her surgeon to discuss surgical options. We will recommend she follow-up with us after surgery to discuss her adjuvant treatment recommendations. Patient was in agreement with my recommendations. All questions were answered to their satisfaction. Patient was advised to contact us anytime should they have any questions or concerns. Electronically signed by Merced Alvarado MD on 5/31/22 at 12:10 PM EDT      Drugs Prescribed:  New Prescriptions    No medications on file       Orders Placed:   No orders of the defined types were placed in this encounter. CC:  Patient Care Team:  Margy Aquino as PCP - General (Family Medicine)  Denis Fernando DO as Consulting Physician (Obstetrics & Gynecology)  Virgilio Lovelace RN as Nurse Navigator (Oncology)         Total time spent on this case on the day of encounter is 60 minutes. This time includes combination of one or more of the following - review of necessary tests, review of pertinent medical records from the EMR, performing medically appropriate examination and evaluation, counseling and educating the patient/family/caregiver, ordering necessary medical tests, procedures etc., documenting the clinical information in the electronic medical record, care coordination, referring and communicating with other health care providers and interpretation of results independently.  This note is created with the assistance of a speech recognition program.  While intending to generate a document that actually reflects the content of the visit, the document can still have some errors including those of syntax and sound a like substitutions which may escape proof reading. It such instances, actual meaning can be extrapolated by contextual diversion.

## 2022-06-01 ENCOUNTER — TELEPHONE (OUTPATIENT)
Dept: ONCOLOGY | Age: 47
End: 2022-06-01

## 2022-06-01 NOTE — TELEPHONE ENCOUNTER
Name: Netta Avila  : 1975  MRN: [id number]    Oncology Navigation Follow-Up Note    Contact Type:  Telephone    Notes: Spoke with pt who had general questions on [place] paperwork and Dr Isaura Souza f/u appt. Writer addressed questions. Pt denied further concerns/barriers at this time. Confirmed patient has writer's contact information. Will continue to follow.      Electronically signed by Meri Bailey RN on 2022 at 8:09 AM

## 2022-06-03 DIAGNOSIS — Z17.0 MALIGNANT NEOPLASM OF UPPER-OUTER QUADRANT OF LEFT BREAST IN FEMALE, ESTROGEN RECEPTOR POSITIVE (HCC): Primary | ICD-10-CM

## 2022-06-03 DIAGNOSIS — C50.412 MALIGNANT NEOPLASM OF UPPER-OUTER QUADRANT OF LEFT BREAST IN FEMALE, ESTROGEN RECEPTOR POSITIVE (HCC): Primary | ICD-10-CM

## 2022-06-06 ENCOUNTER — TELEPHONE (OUTPATIENT)
Dept: ONCOLOGY | Age: 47
End: 2022-06-06

## 2022-06-06 NOTE — TELEPHONE ENCOUNTER
Spoke with April and informed her that the stat Breast Gene Panel results are negative. This includes BRCA1/2 and 6 other high risk genes related to hereditary breast cancer. The remainder of the [address] Expanded panel is still pending and I will call her with those results when available. Informed her that results will be forwarded to Dr. [facility] and Mehrdad Bledsoe RN oncology navigator. She states no further questions at this time.

## 2022-06-07 ENCOUNTER — HOSPITAL ENCOUNTER (OUTPATIENT)
Dept: MRI IMAGING | Age: 47
Discharge: HOME | End: 2022-06-09
Payer: COMMERCIAL

## 2022-06-07 DIAGNOSIS — C50.412 MALIGNANT NEOPLASM OF UPPER-OUTER QUADRANT OF LEFT BREAST IN FEMALE, ESTROGEN RECEPTOR POSITIVE (HCC): ICD-10-CM

## 2022-06-07 DIAGNOSIS — Z17.0 MALIGNANT NEOPLASM OF UPPER-OUTER QUADRANT OF LEFT BREAST IN FEMALE, ESTROGEN RECEPTOR POSITIVE (HCC): ICD-10-CM

## 2022-06-07 PROCEDURE — 77049 MRI BREAST C-+ W/CAD BI: CPT

## 2022-06-07 PROCEDURE — 2580000003 HC RX 258: Performed by: INTERNAL MEDICINE

## 2022-06-07 PROCEDURE — A9579 GAD-BASE MR CONTRAST NOS,1ML: HCPCS | Performed by: INTERNAL MEDICINE

## 2022-06-07 PROCEDURE — 6360000004 HC RX CONTRAST MEDICATION: Performed by: INTERNAL MEDICINE

## 2022-06-07 RX ORDER — SODIUM CHLORIDE 0.9 % (FLUSH) 0.9 %
10 SYRINGE (ML) INJECTION PRN
Status: DISCONTINUED | OUTPATIENT
Start: 2022-06-07 | End: 2022-06-10 | Stop reason: HOSPADM

## 2022-06-07 RX ORDER — 0.9 % SODIUM CHLORIDE 0.9 %
30 INTRAVENOUS SOLUTION INTRAVENOUS ONCE
Status: COMPLETED | OUTPATIENT
Start: 2022-06-07 | End: 2022-06-07

## 2022-06-07 RX ADMIN — SODIUM CHLORIDE 30 ML: 9 INJECTION, SOLUTION INTRAVENOUS at 14:16

## 2022-06-07 RX ADMIN — SODIUM CHLORIDE, PRESERVATIVE FREE 10 ML: 5 INJECTION INTRAVENOUS at 14:16

## 2022-06-07 RX ADMIN — GADOTERIDOL 20 ML: 279.3 INJECTION, SOLUTION INTRAVENOUS at 14:16

## 2022-06-08 ENCOUNTER — TELEPHONE (OUTPATIENT)
Dept: ONCOLOGY | Age: 47
End: 2022-06-08

## 2022-06-08 DIAGNOSIS — Z17.0 MALIGNANT NEOPLASM OF UPPER-OUTER QUADRANT OF LEFT BREAST IN FEMALE, ESTROGEN RECEPTOR POSITIVE (HCC): Primary | ICD-10-CM

## 2022-06-08 DIAGNOSIS — C50.412 MALIGNANT NEOPLASM OF UPPER-OUTER QUADRANT OF LEFT BREAST IN FEMALE, ESTROGEN RECEPTOR POSITIVE (HCC): Primary | ICD-10-CM

## 2022-06-08 DIAGNOSIS — R92.8 ABNORMAL MRI, BREAST: Primary | ICD-10-CM

## 2022-06-08 DIAGNOSIS — C50.412 MALIGNANT NEOPLASM OF UPPER-OUTER QUADRANT OF LEFT BREAST IN FEMALE, ESTROGEN RECEPTOR POSITIVE (HCC): ICD-10-CM

## 2022-06-08 DIAGNOSIS — R92.8 ABNORMAL MRI, BREAST: ICD-10-CM

## 2022-06-08 DIAGNOSIS — Z17.0 MALIGNANT NEOPLASM OF UPPER-OUTER QUADRANT OF LEFT BREAST IN FEMALE, ESTROGEN RECEPTOR POSITIVE (HCC): ICD-10-CM

## 2022-06-08 NOTE — TELEPHONE ENCOUNTER
Name: Man Rosenthal  : 1975  MRN: [id number]    Oncology Navigation Follow-Up Note    Contact Type:  Telephone    Notes: Writer reviewed MRI with Dr Cj Miranda. Orders obtained for R breast US and possible biopsy and L breast US and possible biopsy. Writer spoke with pt and updated her on MRI results per Dr Michelle Luong. Order were placed and call was made to Ana Cotton, Radiology navigator, to assist in scheduling. Left message, will await return call. Confirmed patient has writer's contact information. Will continue to follow. Electronically signed by Moose Araiza RN on 2022 at 10:33 AM     Silvia scheduled pt for US/bx tomorrow.

## 2022-06-09 ENCOUNTER — HOSPITAL ENCOUNTER (OUTPATIENT)
Dept: OCCUPATIONAL THERAPY | Age: 47
Setting detail: THERAPIES SERIES
Discharge: HOME | End: 2022-06-09
Payer: COMMERCIAL

## 2022-06-09 ENCOUNTER — HOSPITAL ENCOUNTER (OUTPATIENT)
Dept: WOMENS IMAGING | Age: 47
Discharge: HOME | End: 2022-06-11
Payer: COMMERCIAL

## 2022-06-09 ENCOUNTER — OFFICE VISIT (OUTPATIENT)
Dept: ONCOLOGY | Age: 47
End: 2022-06-09
Payer: COMMERCIAL

## 2022-06-09 VITALS — DIASTOLIC BLOOD PRESSURE: 97 MMHG | HEART RATE: 104 BPM | SYSTOLIC BLOOD PRESSURE: 142 MMHG

## 2022-06-09 DIAGNOSIS — Z17.0 MALIGNANT NEOPLASM OF UPPER-OUTER QUADRANT OF LEFT BREAST IN FEMALE, ESTROGEN RECEPTOR POSITIVE (HCC): ICD-10-CM

## 2022-06-09 DIAGNOSIS — R92.8 ABNORMAL MRI, BREAST: ICD-10-CM

## 2022-06-09 DIAGNOSIS — Z17.0 MALIGNANT NEOPLASM OF UPPER-OUTER QUADRANT OF LEFT BREAST IN FEMALE, ESTROGEN RECEPTOR POSITIVE (HCC): Primary | ICD-10-CM

## 2022-06-09 DIAGNOSIS — C50.412 MALIGNANT NEOPLASM OF UPPER-OUTER QUADRANT OF LEFT BREAST IN FEMALE, ESTROGEN RECEPTOR POSITIVE (HCC): ICD-10-CM

## 2022-06-09 DIAGNOSIS — C50.412 MALIGNANT NEOPLASM OF UPPER-OUTER QUADRANT OF LEFT BREAST IN FEMALE, ESTROGEN RECEPTOR POSITIVE (HCC): Primary | ICD-10-CM

## 2022-06-09 PROCEDURE — 97535 SELF CARE MNGMENT TRAINING: CPT

## 2022-06-09 PROCEDURE — 76642 ULTRASOUND BREAST LIMITED: CPT

## 2022-06-09 PROCEDURE — 97165 OT EVAL LOW COMPLEX 30 MIN: CPT

## 2022-06-09 PROCEDURE — 99212 OFFICE O/P EST SF 10 MIN: CPT | Performed by: INTERNAL MEDICINE

## 2022-06-09 PROCEDURE — 93702 BIS XTRACELL FLUID ANALYSIS: CPT | Performed by: INTERNAL MEDICINE

## 2022-06-09 NOTE — PROGRESS NOTES
Patient:  Netta Avila   MRN:  [id number]   PCP:  Lana Pitts   Oncologist:  Dr. George Nunez    Date of test:  6/9/2022   Tested Body Part:  Left Arm  Testing Stage:  Baseline    SOZO Lymphedema Assessment:  Baseline    SOZO Measurement Left:   0.0    SOZO Measurement Right:        Gunjan Mayorga from [employer] had patient do sozo today. You will be re-tested following cancer treatment:  Every 3 months for the first 3 years following treatment  Every 6 months during years 4 and 5 following treatment  Annually thereafter    Currently you are following the follow up treatment.   Next Scheduled visit:  In September

## 2022-06-09 NOTE — TELEPHONE ENCOUNTER
[facility]   Hereditary Cancer Risk Assessment     Name: Netta Avila  YOB: 1975  Date of Results Disclosure: 06/09/22      HISTORY   Ms. Avila was seen for genetic counseling due to her personal history of early onset breast cancer. At that time, Ms. Avila chose to pursue genetic testing via the [employer] Expanded + RNA gene panel. These results were discussed with Ms. Avila via telephone. A summary of Ms. Avila's results and recommendations are below. RESULTS  [employer] Cancer[employer]-Expanded Panel + RNAinsight: NEGATIVE - NO CLINICALLY SIGNIFICANT MUTATIONS DETECTED   This panel included the analysis of 77 genes associated with hereditary cancer including: AIP, ALK, APC, HARRIETT, BAP1, BARD1, BLM, BMPR1A, BRCA1, BRCA2, BRIP1, CDC73, CDH1, CDK4, CDKN1B, CDKN2A, CHEK2, CTNNA1, DICER1, EGFR, EGLN1, EPCAM, FANCC, FH, FLCN, GALNT12, GREM1, HOXB13, KIF1B, KIT1, LZTR1, MAX, MEN1, MET, MITF, MLH1, MSH2, MSH3, MSH6, MUTYH, NBN, NF1, NF2, NTHL1, PALB2, PDGFRA, PHOX2B, PMS2, POLD1, POLE, POT1, CMCID6D, PTCH1, PTEN, RAD51C, RAD51D, RB1, RECQL, RET, SDHA, SDHAF2, SDHB, SDHC, ,SDHD, SMAD4, SMARCA4, SMARCB1, SMARCE1, STK11, SUFU, QGJS862, TP53, TSC1, TSC2, VHL, and XRCC2. In addition, no clinically relevant aberrant RNA transcripts were detected in select genes. Please refer to genetic test report for technical details. We discussed that Ms. Avila's negative test result greatly reduces the likelihood that her personal history of cancer is due to a hereditary mutation. It is possible that her personal history of cancer may be due to a gene for which testing was not performed or which has yet to be discovered. RECOMMENDATIONS  1) The outcome of Ms. Avila's genetic test results do not affect her current cancer treatment. Ms. Avila should continue cancer treatment and surveillance as directed by her physicians. 2) Ms. Avila should continue general population cancer screening guidelines as directed by her physicians. RECOMMENDATIONS FOR FAMILY MEMBERS   1) Genetic testing is not recommended for Ms. Avila's children based on her negative test results. However, this recommendation does not take into consideration any family history of cancer in their paternal family. 2) It is possible that the cancers in Ms. Avila's family are due to a hereditary gene mutation that she did not inherit. Therefore, her relatives (particularly those with a current or previous cancer diagnosis) may consider genetic counseling and testing to clarify this possibility. Relatives may contact the [address] [place] [address] at [phone] or locate a genetic counselor at [email or web address]. [email or web address].     3) We encourage Ms. Avila's relatives to discuss their family history of cancer with their physicians to determine the most appropriate cancer screening recommendations. Ms. Michael Smiley female relatives may benefit from a formal breast cancer risk assessment by the [employer] or Yefri Montero risk models to determine if additional breast cancer screening is warranted. SUMMARY & PLAN   1) Ms. Corleys genetic test results are negative meaning there were no clinically significant mutations detected in the 77 genes analyzed. 2) There are no changes in medical management for Ms. Avila based on her negative genetic test results. 3) We encourage Ms. Avila to contact us every 1-2 years to determine if there are any new genetic testing or research options available. 4) We encourage Ms. Avila to contact us with updates to her personal and/or familys cancer history as this information may alter our assessment and/or recommendations. The [address] [place] [address] would be glad to offer our assistance should you have any questions or concerns about this information. Please feel free to contact us at [phone]. [place].  Sia Lima, [address], [facility]   Licensed Genetic Counselor         CC:  Ms. Kennedy Harvey

## 2022-06-09 NOTE — CONSULTS
TREATMENT LOCATION:   [] [facility]/ [facility] [address] [place], [place] [address]a 77: [phone]  F: [phone] [x] [address]: [phone]  F: [phone]      Lymphedema Services - Initial Evaluation for Upper Extremity    Date:  2022  Patient: Kennedy Harvey  : 1975             MRN: [id number]  Referring Physician: Collin Araya MD       Phone: [phone]  Fax: [phone]  Insurance: Costa hurtado (TT:[id number] ) - visits BMN, no co-pay  Medical Diagnosis: C50.412, Z17.0 (ICD-10-CM) - Malignant neoplasm of upper-outer quadrant of left breast in female, estrogen receptor positive ([facility]  Rehab Codes: I89.0, C50.412, Z17.0   Onset Date: 6/3/22  Visit# / total visits:  scheduled; Progress note due at visit 3 per POC   ( Certification Dates: 2022 - 22)    Allergies:  [] None       [] Latex       [] Adhesive tape       [x] Medications    [] Other  Medications: See charted information in Epic  Past Medical History: See charted information in Epic     Restrictions: No blood pressure/blood draw in: LUE after sx  Fall Risk:   [x] No    [] Yes   If yes, intervention:       Overview: Patient is a 56year old female referred to the Lymphedema Clinic with a diagnosis of left breast CA. She is seen today for pre-CA tx education for lymphedema prevention, including measurements for and assisting to order preventative compression sleeve for LUE. Plan to see pt for brief follow up once sleeve is ordered. SUBJECTIVE:  Pt reports that she does not have a sx date set yet.       Hx of Complete Decongestive Therapy:[]Yes - Date:        [x]No     Pain:  [] YES    [x] NO   Location: n/a     Pain Rating: ( 0-10 scale) : 0/10  Comments:     History of Cancer: [x]Yes  Location/Type:  L breast          Currently undergoing treatments at evaluation: []Yes      []No     [x]Pending   Surgery:  pending L lumpectomy with SLNB   Node Dissection: pending Chemotherapy:  TBD   Radiation:  Pending after sx   Hormone Therapy: TBD   Cancer Related Fatigue: mild   Is dominant extremity affected? []Yes  [x]No  [] N/A   SOZO bioimpedance measure? [x]Yes  []No      Comorbidities:   [] Obesity [] Dialysis  [] Other:   [] Asthma/COPD [] Dementia [] Other:   [] Stroke [] Sleep apnea [] Other:   [] Vascular disease [] Rheumatic disease [] Other:     Absolute Lymphedema Contraindications - treatement [x] NONE     [] CHF (only if patient is un-medicated or edema is solely d/t cardiac failure)    [] DVT- Acute, No MLD to limb       [] Advanced Renal Disease - Need Physician Clearance   [] Acute Skin Infection ( e.g. Cellulitis, Ersipelas)   [] Thyroid condition (caution with MLD to neck)   [] Cardiac Arrhythmia   [] Hypersensitive Carotid Sinus    Absolute Contraindications Regarding the Deep Abdomen: [x] NONE    [] Pregnancy    [] Abdominal Aortic Aneurism - Current or history of    [] Inflammatory Disease of bowel or intestines   [] Severe Arteriosclerosis    [] Intra-abdominal scar formations following surgery   [] Recent abdominal surgery   [] Pelvic DVT- Current or history of   [] Presence of clot prevention devices ( e.g. - Brianda Filter)     Relative Lymphedema Contraindications [x] NONE      [] Malignant Lymphedema - Edema is being caused by active cancer. MLD and CDT considered palliative during active cancer treatments. Physician approval required.     [] Age 61+    [] Limb paralysis     Home/Work Environment  Patient lives with: 2 kids at home    In what type of home []  One story   [] Two story   [] Split level  [] Apartment   Number of stairs to enter    With handrail on the []  Right to enter   [] Left to enter   Bathroom has a []  Tub only  [] Tub/shower combo   [] Walk in shower    []  Grab bars   Washing machine is on []  Main level   [] Second level   [] Basement   Employer At home   Job Status []  Normal duty   [] Light duty   [] Off due to condition    [] Retired   [x] Not employed   [] Disability  [] Other:  []  Return to work: Work activities/duties        ADL/IADL Previous level of function Current level of function Who currently assists the patient with task   Bathing  [x] Independent  [] Assist [x] Independent  [] Assist    Dress/grooming [x] Independent  [] Assist [x] Independent  [] Assist    Transfer/mobility [x] Independent  [] Assist [x] Independent  [] Assist    Feeding [x] Independent  [] Assist [x] Independent  [] Assist    Toileting [x] Independent  [] Assist [x] Independent  [] Assist    Driving [x] Independent  [] Assist [x] Independent  [] Assist    Housekeeping [x] Independent  [] Assist [x] Independent  [] Assist    Grocery shop/meal prep [x] Independent  [] Assist [x] Independent  [] Assist      Gait Prior level of function Current level of function    [x] Independent  [] Assist [x] Independent  [] Assist   Device: [] Independent [x] Independent    [] Straight Cane [] Quad cane [] Straight Cane [] Quad cane    [] Standard walker [] Rolling walker   [] 4 wheeled walker [] Standard walker [] Rolling walker   [] 4 wheeled walker    [] Wheelchair [] Wheelchair         OBJECTIVE    Physical Status:   Range of motion: Deficits [] Yes [x] No       Comment:  Strength:         Deficits [] Yes [x] No        Comment:    Presentation of Affected Areas  Location: left Upper Extremity    Description: No Edema Currently Present  - pre-treatment, will continue to monitor for symptoms. Scars No   Wounds None   Sensation Normal for UE's, sometimes gets tingly in toes 2/2 diabetes   Additional Comments:        Circumferential Measurements  Measurements taken from nail base of D3 digit. Fingers are measured at the base.    Measurements (cm) Right Left   Dorsum 10 19.1 19.1   Wrist 15 15.7 15.8   Lower Forearm 21 20.6 19.0   Upper Forearm 33 28.3 26.9   Olecranon 40 29.6 26.8   Lower Bicep 47 34.3 30.7   Upper Bicep 55 39.7 36.4   Initial Total 6/9/2022:   187.3 174.7          ASSESSMENT:  Pt is currently pending treatment for L breast CA and would benefit from prophylactic education & treatment to include information regarding risk reduction practices to decrease risk of disease progression & infection, signs/symptoms of disease progression, preventative compression garments, and follow up for monitoring. Pt is educated utilizing the following handout that includes information regarding signs/symptoms to be aware of to prevent disease progression & compression sleeve wearing schedule that will also aid in prevention of disease progression. Compression therapy is critically important while pt is asymptomatic to prevent the development of symptomatic disease (stages 1-3 lymphedema). Pt is also educated on an impaired lymphatic system 2/2 CA treatment vs. a healthy lymphatic system and how it relates to swelling and skin integrity, given her risk factors. Patient would benefit from skilled occupational therapy services in order to provide the education necessary to prevent symptomatic lymphedema and to educate on long term management of condition. Treatment may include the following should pt become symptomatic:    [x]Bandaging   [x]Self-Bandaging/Caregiver Training   [x]MLD    [x]Self-MLD   [x] Therapeutic Exercise    [x] Education/Instruction in home management program  [x] Education and trial of a Vasopneumatic Pump    [x] Education and transition to long term management devices such as velcro compression garments and/or daytime compression sleeve/stocking(s)   [x]Discharge to Home Program        It is recommended that pt obtain preventative compression sleeve. Pt is educated on ordering process for obtaining compression arm sleeve & is measured for the following garment. Pt is agreeable for the order to be submitted on their behalf via [address] [employer].     Name: Netta Avila  MRN: [id number]  Brand & Style: Jobst  Extremity:  left  Compression: 20-30mmHg  Size: 6  Width Regular/Standard  Length: Regular  Color/Pattern: Beige   Silicone Border: YES   Treatment location: [place]      Early Signs/Symptoms of Swelling    Be on the lookout for the following symptoms at the at risk area, such as the arm, breast/chest, or armpit of the affected side.  New limb heaviness (most common initial symptom)   New pain, pulling, discomfort, and/or tightness in at risk area   Change in skin appearance/texture such as indentation (pitting) or firmness   New tenderness, tingling, burning sensation down the arm   Jewelry or clothing tight on affected side   Visible swelling of area    *Should you start experiencing any of the signs/symptoms of early onset swelling above, it is recommended that you return to daily sleeve wearing x30 days in order to prevent onset of visible swelling and follow up with your lymphedema therapist. Ideally you are using the sleeve as a remedy to alleviate the symptoms and prevent progression to severe swelling. *       Preventative Sleeve Wearing Schedule    It is recommended that you obtain a compression arm sleeve for preventative purposes for your at-risk arm. Please wear DAILY for at least 30 days following surgery and for at least 30 days following your last day of radiation treatment. If you are more than 30 days out after surgery and your radiation is still pending, please wear your sleeve as a preventative measure during the activities below. Once you begin radiation, return to daily wear during treatment and for 30 days to follow. After this, you may return to wearing your sleeve as a preventative measure during the activities below. The list below contains activities that could overwhelm an already impaired lymphatic system, either from lymph node removal during surgery or damage caused by radiation, and cause onset of swelling. If a compression sleeve is worn during these activities, the risk is greatly reduced.  These activities should be avoided if you are not wearing compression to the at risk area.  Airplane travel   Exercise   Heavy chores/work   Any task that requires repetitive arm movement      FYI:  Avoid applying moisturizers immediately before putting on your sleeve as this may break down the material.     Wash your sleeve per 's guidelines to maintain its integrity for best compression. If you are wearing your sleeve daily, please put your sleeve on first thing in the morning, or after your shower if shower in the mornings, and remove before bedtime. Compression sleeves SHOULD NOT be worn to bed due to risk of tourniquet effect. Response to treatment: Pt verbalizes and is agreeable to the instructions/ POC established at today's evaluation. PLAN FOR NEXT VISIT: sleeve fit and train, review, stage lymphedema            Short Term Goals to be met within 2 visits  Pt will demonstrate compliance of maintaining lymphedema precautions to reduce the risks of progression of lymphedema beyond stage 0/pre-symptomatic stage    Pt will be able to verbalize early signs/symptoms of lymphedema in order to effectively prevent onset of symptomatic lymphedema to LUQ. Pt will demo understanding of preventative compression sleeve wearing schedule along with independent donning/doffing to prevent onset of symptomatic lymphedema. Long Term Goals to be met within 3 visits    Pt will be monitored for progression into lymphedema stages 1 and beyond as evidenced by measurable change in swelling, observable skin changes, and/or subjective pt reports at which time goals will be updated accordingly to reflect need for decongestive therapy.       Patient's Goal: prevent swelling     Response to Education Provided:  [x] Verbalized understanding   [] Demonstrates/verbalizes HEP/Education previously given      [x] Needs continued review            [] No understanding   Learner(s): [x] Patient  [x] Spouse [] Family  [] Other:   Method(s): [x] Verbal [] Demonstration [x] Handouts [] Exercise booklet   Family available to assist with home program if needed: [x] Yes [] No    FREQUENCY: Pt will be seen 1 x/ week for 3 visits and will follow up as appropriate. Focus is to remain on short and long term goals listed above. Rehabilitation Potential/Commitment: [x] Good [] Fair     [] Poor    Functional Outcome Measure(s):  Lymphedema Life Impact Scale (LLIS) Score: not given, pt is pre/asymptomatic  Brief Fatigue Inventory: 1 - mild     Clearance needed:  []Yes    [x]No     Physicians/specialties giving clearance:    Referral needed to: [] Physical Therapy               Treatment Charges   Minutes   Units   Evaluation                                                             $95.15 / $75.40            Low   (58609) 20 1          Moderate   (63028)            High   (45725)     Manual Therapy (39445):                                      $26.92 / $21.34     Therapeutic activities (42028):                             $37.46/ $26.79     Therapeutic Exercise (90135)                              $29.21/$ 22.84     Self care/home mgmt (90163)                              $32.39 / $24.43 40 3   Vasopneumatic Device (87177)                           $9.21     Total Treatment Time    60 4         Time In:  0700  Time Out: 0800      Electronically signed by Bret Collado OT on 6/9/2022 at 7:06 AM      Physician Signature: _________________________        Date: _______________  By signing above or cosigning this note, I have reviewed this plan of care and certify a need to continue medically necessary rehabilitation services.       *PLEASE SIGN ABOVE AND FAX BACK .[phone] WITH ALL PAGES FOR CONSENT TO CONTINUE LYMPHEDEMA TREATMENT*

## 2022-06-13 ENCOUNTER — TELEPHONE (OUTPATIENT)
Dept: ONCOLOGY | Age: 47
End: 2022-06-13

## 2022-06-13 NOTE — TELEPHONE ENCOUNTER
Name: Luis Antonio Blue  : 1975  MRN: [id number]    Oncology Navigation Follow-Up Note    Contact Type:  Telephone    Notes: Writer spoke with Dr Nav Combs office, notified Germaine, , that pt had imaging and genetic results back.  [facility] is out of the office today so she stated she will speak to him tomorrow and see if she can get pt scheduled. Writer spoke with pt , updated her on above. Pt voiced that she is having a new pain in breast which started after her recent ultra sound. She stated they did a lot of poking and pushes to get a picture. Writer explained this may be from [address] and she may have a bruise. Encouraged her to call office if symptoms worsen. She verbalized understanding. Confirmed patient has writer's contact information. Will continue to follow.      Electronically signed by Chauncey Smith RN on 2022 at 3:06 PM

## 2022-06-16 ENCOUNTER — TELEPHONE (OUTPATIENT)
Dept: SURGERY | Age: 47
End: 2022-06-16

## 2022-06-16 NOTE — TELEPHONE ENCOUNTER
6/16/22- Spoke to patient, surgery scheduled at [facility] deep [place], patient confirmed and information mailed to patient    Surgery date/time: 7/7/22 at [address] time: 9am  PAT: 6/22/22 at [address] placement: 7/6/22 at 2pm at [address] [place] [address] nuclear injection: 7/6/22 at 2:30pm at [address]

## 2022-06-22 ENCOUNTER — HOSPITAL ENCOUNTER (OUTPATIENT)
Age: 47
Setting detail: SPECIMEN
Discharge: HOME | End: 2022-06-22

## 2022-06-22 ENCOUNTER — HOSPITAL ENCOUNTER (OUTPATIENT)
Dept: PREADMISSION TESTING | Age: 47
Discharge: HOME | End: 2022-06-26
Payer: COMMERCIAL

## 2022-06-22 VITALS
BODY MASS INDEX: 36.7 KG/M2 | WEIGHT: 215 LBS | HEIGHT: 64 IN | OXYGEN SATURATION: 98 % | RESPIRATION RATE: 16 BRPM | DIASTOLIC BLOOD PRESSURE: 80 MMHG | HEART RATE: 88 BPM | SYSTOLIC BLOOD PRESSURE: 156 MMHG

## 2022-06-22 DIAGNOSIS — Z01.818 PREOP TESTING: ICD-10-CM

## 2022-06-22 DIAGNOSIS — Z01.818 PREOP TESTING: Primary | ICD-10-CM

## 2022-06-22 LAB
ABSOLUTE EOS #: 0.15 K/UL (ref 0–0.44)
ABSOLUTE IMMATURE GRANULOCYTE: 0.07 K/UL (ref 0–0.3)
ABSOLUTE LYMPH #: 2.73 K/UL (ref 1.1–3.7)
ABSOLUTE MONO #: 0.71 K/UL (ref 0.1–1.2)
BASOPHILS # BLD: 0 % (ref 0–2)
BASOPHILS ABSOLUTE: 0.05 K/UL (ref 0–0.2)
EOSINOPHILS RELATIVE PERCENT: 1 % (ref 1–4)
HCT VFR BLD CALC: 40.8 % (ref 36.3–47.1)
HEMOGLOBIN: 13.4 G/DL (ref 11.9–15.1)
IMMATURE GRANULOCYTES: 1 %
LYMPHOCYTES # BLD: 20 % (ref 24–43)
MCH RBC QN AUTO: 27.7 PG (ref 25.2–33.5)
MCHC RBC AUTO-ENTMCNC: 32.8 G/DL (ref 28.4–34.8)
MCV RBC AUTO: 84.3 FL (ref 82.6–102.9)
MONOCYTES # BLD: 5 % (ref 3–12)
NRBC AUTOMATED: 0 PER 100 WBC
PDW BLD-RTO: 13 % (ref 11.8–14.4)
PLATELET # BLD: 357 K/UL (ref 138–453)
PMV BLD AUTO: 10.9 FL (ref 8.1–13.5)
RBC # BLD: 4.84 M/UL (ref 3.95–5.11)
SEG NEUTROPHILS: 73 % (ref 36–65)
SEGMENTED NEUTROPHILS ABSOLUTE COUNT: 10.17 K/UL (ref 1.5–8.1)
WBC # BLD: 13.9 K/UL (ref 3.5–11.3)

## 2022-06-22 PROCEDURE — 93005 ELECTROCARDIOGRAM TRACING: CPT | Performed by: ANESTHESIOLOGY

## 2022-06-22 RX ORDER — TIZANIDINE HYDROCHLORIDE 2 MG/1
CAPSULE, GELATIN COATED ORAL
COMMUNITY

## 2022-06-23 ENCOUNTER — ANESTHESIA EVENT (OUTPATIENT)
Dept: OPERATING ROOM | Age: 47
End: 2022-06-23
Payer: COMMERCIAL

## 2022-06-23 LAB
ANION GAP SERPL CALCULATED.3IONS-SCNC: 12 MMOL/L (ref 9–17)
BUN BLDV-MCNC: 10 MG/DL (ref 6–20)
CALCIUM SERPL-MCNC: 9.3 MG/DL (ref 8.6–10.4)
CHLORIDE BLD-SCNC: 97 MMOL/L (ref 98–107)
CO2: 24 MMOL/L (ref 20–31)
CREAT SERPL-MCNC: 0.58 MG/DL (ref 0.5–0.9)
EKG ATRIAL RATE: 78 BPM
EKG P AXIS: 42 DEGREES
EKG P-R INTERVAL: 170 MS
EKG Q-T INTERVAL: 390 MS
EKG QRS DURATION: 96 MS
EKG QTC CALCULATION (BAZETT): 444 MS
EKG R AXIS: -5 DEGREES
EKG T AXIS: 30 DEGREES
EKG VENTRICULAR RATE: 78 BPM
GFR AFRICAN AMERICAN: >60 ML/MIN
GFR NON-AFRICAN AMERICAN: >60 ML/MIN
GFR SERPL CREATININE-BSD FRML MDRD: ABNORMAL ML/MIN/{1.73_M2}
GLUCOSE BLD-MCNC: 248 MG/DL (ref 70–99)
POTASSIUM SERPL-SCNC: 4.6 MMOL/L (ref 3.7–5.3)
SODIUM BLD-SCNC: 133 MMOL/L (ref 135–144)

## 2022-07-06 ENCOUNTER — TELEPHONE (OUTPATIENT)
Dept: ONCOLOGY | Age: 47
End: 2022-07-06

## 2022-07-06 ENCOUNTER — HOSPITAL ENCOUNTER (OUTPATIENT)
Dept: MAMMOGRAPHY | Age: 47
Discharge: HOME | End: 2022-07-08
Payer: COMMERCIAL

## 2022-07-06 ENCOUNTER — HOSPITAL ENCOUNTER (OUTPATIENT)
Dept: NUCLEAR MEDICINE | Age: 47
Discharge: HOME | End: 2022-07-08
Payer: COMMERCIAL

## 2022-07-06 ENCOUNTER — HOSPITAL ENCOUNTER (OUTPATIENT)
Dept: ULTRASOUND IMAGING | Age: 47
Discharge: HOME | End: 2022-07-08
Payer: COMMERCIAL

## 2022-07-06 DIAGNOSIS — N63.0 BREAST MASS: ICD-10-CM

## 2022-07-06 DIAGNOSIS — R92.8 ABNORMAL MAMMOGRAM: ICD-10-CM

## 2022-07-06 DIAGNOSIS — C50.912 MALIGNANT NEOPLASM OF LEFT FEMALE BREAST, UNSPECIFIED ESTROGEN RECEPTOR STATUS, UNSPECIFIED SITE OF BREAST (HCC): ICD-10-CM

## 2022-07-06 DIAGNOSIS — Z98.890 STATUS POST BREAST BIOPSY: ICD-10-CM

## 2022-07-06 PROCEDURE — 78195 LYMPH SYSTEM IMAGING: CPT

## 2022-07-06 PROCEDURE — 19285 PERQ DEV BREAST 1ST US IMAG: CPT

## 2022-07-06 PROCEDURE — A4648 IMPLANTABLE TISSUE MARKER: HCPCS

## 2022-07-06 PROCEDURE — 19286 PERQ DEV BREAST ADD US IMAG: CPT

## 2022-07-06 PROCEDURE — A9520 TC99 TILMANOCEPT DIAG 0.5MCI: HCPCS | Performed by: SURGERY

## 2022-07-06 PROCEDURE — 3430000000 HC RX DIAGNOSTIC RADIOPHARMACEUTICAL: Performed by: SURGERY

## 2022-07-06 RX ADMIN — TILMANOCEPT 2 MILLICURIE: KIT at 14:46

## 2022-07-06 NOTE — TELEPHONE ENCOUNTER
Name: Netta Avila  : 1975  MRN: [id number]    Oncology Navigation Follow-Up Note    Contact Type:  Telephone    Notes: Spoke with pt for ONN f/u prior to surgery. She is aware of appt details and denied any barriers. Writer provided emotional support and encouraged pt to contact writer with any concerns post op. Leah Nguyen, [facility] , a message requesting she contact pt to coordinate a post op f/u appt with Dr Pili Johnson. Confirmed patient has writer's contact information. Will continue to follow.      Electronically signed by Neal Uribe RN on 2022 at 9:53 AM

## 2022-07-07 ENCOUNTER — ANESTHESIA (OUTPATIENT)
Dept: OPERATING ROOM | Age: 47
End: 2022-07-07
Payer: COMMERCIAL

## 2022-07-07 ENCOUNTER — APPOINTMENT (OUTPATIENT)
Dept: MAMMOGRAPHY | Age: 47
End: 2022-07-07
Attending: SURGERY
Payer: COMMERCIAL

## 2022-07-07 ENCOUNTER — HOSPITAL ENCOUNTER (OUTPATIENT)
Age: 47
Setting detail: OUTPATIENT SURGERY
Discharge: HOME | End: 2022-07-07
Attending: SURGERY | Admitting: SURGERY
Payer: COMMERCIAL

## 2022-07-07 VITALS
BODY MASS INDEX: 36.37 KG/M2 | SYSTOLIC BLOOD PRESSURE: 134 MMHG | RESPIRATION RATE: 12 BRPM | OXYGEN SATURATION: 92 % | DIASTOLIC BLOOD PRESSURE: 76 MMHG | HEART RATE: 100 BPM | HEIGHT: 64 IN | TEMPERATURE: 97.2 F | WEIGHT: 213 LBS

## 2022-07-07 DIAGNOSIS — Z17.0 MALIGNANT NEOPLASM OF UPPER-OUTER QUADRANT OF LEFT BREAST IN FEMALE, ESTROGEN RECEPTOR POSITIVE (HCC): Primary | ICD-10-CM

## 2022-07-07 DIAGNOSIS — Z85.3 HISTORY OF LEFT BREAST CANCER: ICD-10-CM

## 2022-07-07 DIAGNOSIS — C50.412 MALIGNANT NEOPLASM OF UPPER-OUTER QUADRANT OF LEFT BREAST IN FEMALE, ESTROGEN RECEPTOR POSITIVE (HCC): Primary | ICD-10-CM

## 2022-07-07 LAB
GLUCOSE BLD-MCNC: 249 MG/DL (ref 65–105)
GLUCOSE BLD-MCNC: 272 MG/DL (ref 65–105)

## 2022-07-07 PROCEDURE — 6360000002 HC RX W HCPCS

## 2022-07-07 PROCEDURE — 3600000003 HC SURGERY LEVEL 3 BASE: Performed by: SURGERY

## 2022-07-07 PROCEDURE — 88307 TISSUE EXAM BY PATHOLOGIST: CPT

## 2022-07-07 PROCEDURE — 7100000000 HC PACU RECOVERY - FIRST 15 MIN: Performed by: SURGERY

## 2022-07-07 PROCEDURE — 3700000001 HC ADD 15 MINUTES (ANESTHESIA): Performed by: SURGERY

## 2022-07-07 PROCEDURE — 2500000003 HC RX 250 WO HCPCS

## 2022-07-07 PROCEDURE — 7100000011 HC PHASE II RECOVERY - ADDTL 15 MIN: Performed by: SURGERY

## 2022-07-07 PROCEDURE — 7100000001 HC PACU RECOVERY - ADDTL 15 MIN: Performed by: SURGERY

## 2022-07-07 PROCEDURE — 3600000013 HC SURGERY LEVEL 3 ADDTL 15MIN: Performed by: SURGERY

## 2022-07-07 PROCEDURE — 6360000002 HC RX W HCPCS: Performed by: ANESTHESIOLOGY

## 2022-07-07 PROCEDURE — 76098 X-RAY EXAM SURGICAL SPECIMEN: CPT

## 2022-07-07 PROCEDURE — 2500000003 HC RX 250 WO HCPCS: Performed by: ANESTHESIOLOGY

## 2022-07-07 PROCEDURE — 38900 IO MAP OF SENT LYMPH NODE: CPT | Performed by: SURGERY

## 2022-07-07 PROCEDURE — 88305 TISSUE EXAM BY PATHOLOGIST: CPT

## 2022-07-07 PROCEDURE — 38525 BIOPSY/REMOVAL LYMPH NODES: CPT | Performed by: SURGERY

## 2022-07-07 PROCEDURE — 6370000000 HC RX 637 (ALT 250 FOR IP): Performed by: ANESTHESIOLOGY

## 2022-07-07 PROCEDURE — 2709999900 HC NON-CHARGEABLE SUPPLY: Performed by: SURGERY

## 2022-07-07 PROCEDURE — 2580000003 HC RX 258: Performed by: ANESTHESIOLOGY

## 2022-07-07 PROCEDURE — 2500000003 HC RX 250 WO HCPCS: Performed by: SURGERY

## 2022-07-07 PROCEDURE — 6360000002 HC RX W HCPCS: Performed by: SURGERY

## 2022-07-07 PROCEDURE — 6370000000 HC RX 637 (ALT 250 FOR IP)

## 2022-07-07 PROCEDURE — 6360000002 HC RX W HCPCS: Performed by: NURSE ANESTHETIST, CERTIFIED REGISTERED

## 2022-07-07 PROCEDURE — 2720000010 HC SURG SUPPLY STERILE: Performed by: SURGERY

## 2022-07-07 PROCEDURE — 88342 IMHCHEM/IMCYTCHM 1ST ANTB: CPT

## 2022-07-07 PROCEDURE — 82947 ASSAY GLUCOSE BLOOD QUANT: CPT

## 2022-07-07 PROCEDURE — 7100000010 HC PHASE II RECOVERY - FIRST 15 MIN: Performed by: SURGERY

## 2022-07-07 PROCEDURE — 19301 PARTIAL MASTECTOMY: CPT | Performed by: SURGERY

## 2022-07-07 PROCEDURE — 2580000003 HC RX 258: Performed by: NURSE ANESTHETIST, CERTIFIED REGISTERED

## 2022-07-07 PROCEDURE — 2500000003 HC RX 250 WO HCPCS: Performed by: NURSE ANESTHETIST, CERTIFIED REGISTERED

## 2022-07-07 PROCEDURE — 3700000000 HC ANESTHESIA ATTENDED CARE: Performed by: SURGERY

## 2022-07-07 RX ORDER — PROMETHAZINE HYDROCHLORIDE 25 MG/ML
6.25 INJECTION, SOLUTION INTRAMUSCULAR; INTRAVENOUS EVERY 5 MIN PRN
Status: DISCONTINUED | OUTPATIENT
Start: 2022-07-07 | End: 2022-07-07 | Stop reason: HOSPADM

## 2022-07-07 RX ORDER — SODIUM CHLORIDE 0.9 % (FLUSH) 0.9 %
5-40 SYRINGE (ML) INJECTION EVERY 12 HOURS SCHEDULED
Status: DISCONTINUED | OUTPATIENT
Start: 2022-07-07 | End: 2022-07-07 | Stop reason: HOSPADM

## 2022-07-07 RX ORDER — SODIUM CHLORIDE 0.9 % (FLUSH) 0.9 %
5-40 SYRINGE (ML) INJECTION PRN
Status: DISCONTINUED | OUTPATIENT
Start: 2022-07-07 | End: 2022-07-07 | Stop reason: HOSPADM

## 2022-07-07 RX ORDER — METHYLENE BLUE 10 MG/ML
INJECTION INTRAVENOUS PRN
Status: DISCONTINUED | OUTPATIENT
Start: 2022-07-07 | End: 2022-07-07 | Stop reason: ALTCHOICE

## 2022-07-07 RX ORDER — NEOSTIGMINE METHYLSULFATE 5 MG/5 ML
SYRINGE (ML) INTRAVENOUS PRN
Status: DISCONTINUED | OUTPATIENT
Start: 2022-07-07 | End: 2022-07-07 | Stop reason: SDUPTHER

## 2022-07-07 RX ORDER — ONDANSETRON 2 MG/ML
4 INJECTION INTRAMUSCULAR; INTRAVENOUS
Status: COMPLETED | OUTPATIENT
Start: 2022-07-07 | End: 2022-07-07

## 2022-07-07 RX ORDER — ONDANSETRON 2 MG/ML
INJECTION INTRAMUSCULAR; INTRAVENOUS
Status: COMPLETED
Start: 2022-07-07 | End: 2022-07-07

## 2022-07-07 RX ORDER — LIDOCAINE HYDROCHLORIDE 10 MG/ML
INJECTION, SOLUTION EPIDURAL; INFILTRATION; INTRACAUDAL; PERINEURAL
Status: DISCONTINUED
Start: 2022-07-07 | End: 2022-07-07 | Stop reason: HOSPADM

## 2022-07-07 RX ORDER — MORPHINE SULFATE 2 MG/ML
2 INJECTION, SOLUTION INTRAMUSCULAR; INTRAVENOUS EVERY 5 MIN PRN
Status: DISCONTINUED | OUTPATIENT
Start: 2022-07-07 | End: 2022-07-07 | Stop reason: HOSPADM

## 2022-07-07 RX ORDER — PROPOFOL 10 MG/ML
INJECTION, EMULSION INTRAVENOUS PRN
Status: DISCONTINUED | OUTPATIENT
Start: 2022-07-07 | End: 2022-07-07 | Stop reason: SDUPTHER

## 2022-07-07 RX ORDER — ONDANSETRON 2 MG/ML
INJECTION INTRAMUSCULAR; INTRAVENOUS PRN
Status: DISCONTINUED | OUTPATIENT
Start: 2022-07-07 | End: 2022-07-07 | Stop reason: SDUPTHER

## 2022-07-07 RX ORDER — ONDANSETRON 4 MG/1
4 TABLET, FILM COATED ORAL EVERY 8 HOURS PRN
Qty: 10 TABLET | Refills: 0 | Status: SHIPPED | OUTPATIENT
Start: 2022-07-07 | End: 2022-07-12

## 2022-07-07 RX ORDER — OXYCODONE HYDROCHLORIDE AND ACETAMINOPHEN 5; 325 MG/1; MG/1
2 TABLET ORAL
Status: DISCONTINUED | OUTPATIENT
Start: 2022-07-07 | End: 2022-07-07 | Stop reason: HOSPADM

## 2022-07-07 RX ORDER — LABETALOL HYDROCHLORIDE 5 MG/ML
10 INJECTION, SOLUTION INTRAVENOUS
Status: DISCONTINUED | OUTPATIENT
Start: 2022-07-07 | End: 2022-07-07 | Stop reason: HOSPADM

## 2022-07-07 RX ORDER — MIDAZOLAM HYDROCHLORIDE 1 MG/ML
INJECTION INTRAMUSCULAR; INTRAVENOUS PRN
Status: DISCONTINUED | OUTPATIENT
Start: 2022-07-07 | End: 2022-07-07 | Stop reason: SDUPTHER

## 2022-07-07 RX ORDER — HYDROCODONE BITARTRATE AND ACETAMINOPHEN 5; 325 MG/1; MG/1
1 TABLET ORAL EVERY 6 HOURS PRN
Qty: 10 TABLET | Refills: 0 | Status: SHIPPED | OUTPATIENT
Start: 2022-07-07 | End: 2022-07-14

## 2022-07-07 RX ORDER — MEPERIDINE HYDROCHLORIDE 50 MG/ML
12.5 INJECTION INTRAMUSCULAR; INTRAVENOUS; SUBCUTANEOUS EVERY 5 MIN PRN
Status: DISCONTINUED | OUTPATIENT
Start: 2022-07-07 | End: 2022-07-07 | Stop reason: HOSPADM

## 2022-07-07 RX ORDER — SODIUM CHLORIDE 9 MG/ML
INJECTION, SOLUTION INTRAVENOUS PRN
Status: DISCONTINUED | OUTPATIENT
Start: 2022-07-07 | End: 2022-07-07 | Stop reason: HOSPADM

## 2022-07-07 RX ORDER — SODIUM CHLORIDE 9 MG/ML
25 INJECTION, SOLUTION INTRAVENOUS PRN
Status: DISCONTINUED | OUTPATIENT
Start: 2022-07-07 | End: 2022-07-07 | Stop reason: HOSPADM

## 2022-07-07 RX ORDER — FENTANYL CITRATE 50 UG/ML
INJECTION, SOLUTION INTRAMUSCULAR; INTRAVENOUS PRN
Status: DISCONTINUED | OUTPATIENT
Start: 2022-07-07 | End: 2022-07-07 | Stop reason: SDUPTHER

## 2022-07-07 RX ORDER — BUPIVACAINE HYDROCHLORIDE 2.5 MG/ML
INJECTION, SOLUTION EPIDURAL; INFILTRATION; INTRACAUDAL
Status: DISCONTINUED
Start: 2022-07-07 | End: 2022-07-07 | Stop reason: HOSPADM

## 2022-07-07 RX ORDER — ROCURONIUM BROMIDE 10 MG/ML
INJECTION, SOLUTION INTRAVENOUS PRN
Status: DISCONTINUED | OUTPATIENT
Start: 2022-07-07 | End: 2022-07-07 | Stop reason: SDUPTHER

## 2022-07-07 RX ORDER — DEXAMETHASONE SODIUM PHOSPHATE 10 MG/ML
INJECTION, SOLUTION INTRAMUSCULAR; INTRAVENOUS PRN
Status: DISCONTINUED | OUTPATIENT
Start: 2022-07-07 | End: 2022-07-07 | Stop reason: SDUPTHER

## 2022-07-07 RX ORDER — SODIUM CHLORIDE, SODIUM LACTATE, POTASSIUM CHLORIDE, CALCIUM CHLORIDE 600; 310; 30; 20 MG/100ML; MG/100ML; MG/100ML; MG/100ML
INJECTION, SOLUTION INTRAVENOUS CONTINUOUS
Status: DISCONTINUED | OUTPATIENT
Start: 2022-07-07 | End: 2022-07-07 | Stop reason: HOSPADM

## 2022-07-07 RX ORDER — IPRATROPIUM BROMIDE AND ALBUTEROL SULFATE 2.5; .5 MG/3ML; MG/3ML
1 SOLUTION RESPIRATORY (INHALATION)
Status: DISCONTINUED | OUTPATIENT
Start: 2022-07-07 | End: 2022-07-07 | Stop reason: HOSPADM

## 2022-07-07 RX ORDER — OXYCODONE HYDROCHLORIDE AND ACETAMINOPHEN 5; 325 MG/1; MG/1
1 TABLET ORAL
Status: COMPLETED | OUTPATIENT
Start: 2022-07-07 | End: 2022-07-07

## 2022-07-07 RX ORDER — GLYCOPYRROLATE 1 MG/5 ML
SYRINGE (ML) INTRAVENOUS PRN
Status: DISCONTINUED | OUTPATIENT
Start: 2022-07-07 | End: 2022-07-07 | Stop reason: SDUPTHER

## 2022-07-07 RX ORDER — MIDAZOLAM HYDROCHLORIDE 2 MG/2ML
2 INJECTION, SOLUTION INTRAMUSCULAR; INTRAVENOUS
Status: DISCONTINUED | OUTPATIENT
Start: 2022-07-07 | End: 2022-07-07 | Stop reason: HOSPADM

## 2022-07-07 RX ORDER — SODIUM CHLORIDE, SODIUM LACTATE, POTASSIUM CHLORIDE, CALCIUM CHLORIDE 600; 310; 30; 20 MG/100ML; MG/100ML; MG/100ML; MG/100ML
INJECTION, SOLUTION INTRAVENOUS CONTINUOUS PRN
Status: DISCONTINUED | OUTPATIENT
Start: 2022-07-07 | End: 2022-07-07 | Stop reason: SDUPTHER

## 2022-07-07 RX ORDER — DOCUSATE SODIUM 100 MG/1
100 CAPSULE, LIQUID FILLED ORAL 2 TIMES DAILY
Qty: 20 CAPSULE | Refills: 0 | Status: SHIPPED | OUTPATIENT
Start: 2022-07-07

## 2022-07-07 RX ORDER — LIDOCAINE HYDROCHLORIDE 10 MG/ML
INJECTION, SOLUTION EPIDURAL; INFILTRATION; INTRACAUDAL; PERINEURAL PRN
Status: DISCONTINUED | OUTPATIENT
Start: 2022-07-07 | End: 2022-07-07 | Stop reason: SDUPTHER

## 2022-07-07 RX ORDER — SODIUM CHLORIDE 9 MG/ML
INJECTION, SOLUTION INTRAVENOUS CONTINUOUS
Status: DISCONTINUED | OUTPATIENT
Start: 2022-07-07 | End: 2022-07-07 | Stop reason: HOSPADM

## 2022-07-07 RX ORDER — OXYCODONE HYDROCHLORIDE AND ACETAMINOPHEN 5; 325 MG/1; MG/1
TABLET ORAL
Status: COMPLETED
Start: 2022-07-07 | End: 2022-07-07

## 2022-07-07 RX ORDER — DIPHENHYDRAMINE HYDROCHLORIDE 50 MG/ML
12.5 INJECTION INTRAMUSCULAR; INTRAVENOUS
Status: DISCONTINUED | OUTPATIENT
Start: 2022-07-07 | End: 2022-07-07 | Stop reason: HOSPADM

## 2022-07-07 RX ORDER — MORPHINE SULFATE 2 MG/ML
INJECTION, SOLUTION INTRAMUSCULAR; INTRAVENOUS
Status: COMPLETED
Start: 2022-07-07 | End: 2022-07-07

## 2022-07-07 RX ADMIN — PROPOFOL 200 MG: 10 INJECTION, EMULSION INTRAVENOUS at 11:36

## 2022-07-07 RX ADMIN — Medication 0.4 MG: at 12:35

## 2022-07-07 RX ADMIN — SODIUM CHLORIDE, POTASSIUM CHLORIDE, SODIUM LACTATE AND CALCIUM CHLORIDE: 600; 310; 30; 20 INJECTION, SOLUTION INTRAVENOUS at 12:27

## 2022-07-07 RX ADMIN — ONDANSETRON 4 MG: 2 INJECTION INTRAMUSCULAR; INTRAVENOUS at 12:35

## 2022-07-07 RX ADMIN — CEFAZOLIN 2000 MG: 10 INJECTION, POWDER, FOR SOLUTION INTRAVENOUS at 11:44

## 2022-07-07 RX ADMIN — OXYCODONE HYDROCHLORIDE AND ACETAMINOPHEN 1 TABLET: 5; 325 TABLET ORAL at 15:00

## 2022-07-07 RX ADMIN — MIDAZOLAM HYDROCHLORIDE 2 MG: 1 INJECTION, SOLUTION INTRAMUSCULAR; INTRAVENOUS at 11:31

## 2022-07-07 RX ADMIN — PROPOFOL 60 MG: 10 INJECTION, EMULSION INTRAVENOUS at 12:58

## 2022-07-07 RX ADMIN — FENTANYL CITRATE 50 MCG: 50 INJECTION, SOLUTION INTRAMUSCULAR; INTRAVENOUS at 11:55

## 2022-07-07 RX ADMIN — SODIUM CHLORIDE: 9 INJECTION, SOLUTION INTRAVENOUS at 14:29

## 2022-07-07 RX ADMIN — ROCURONIUM BROMIDE 30 MG: 10 INJECTION INTRAVENOUS at 11:36

## 2022-07-07 RX ADMIN — Medication 2 MG: at 12:35

## 2022-07-07 RX ADMIN — ONDANSETRON 4 MG: 2 INJECTION INTRAMUSCULAR; INTRAVENOUS at 13:44

## 2022-07-07 RX ADMIN — DEXAMETHASONE SODIUM PHOSPHATE 10 MG: 10 INJECTION INTRAMUSCULAR; INTRAVENOUS at 11:42

## 2022-07-07 RX ADMIN — FENTANYL CITRATE 50 MCG: 50 INJECTION, SOLUTION INTRAMUSCULAR; INTRAVENOUS at 12:55

## 2022-07-07 RX ADMIN — HYDROMORPHONE HYDROCHLORIDE 0.5 MG: 1 INJECTION, SOLUTION INTRAMUSCULAR; INTRAVENOUS; SUBCUTANEOUS at 13:44

## 2022-07-07 RX ADMIN — FENTANYL CITRATE 100 MCG: 50 INJECTION, SOLUTION INTRAMUSCULAR; INTRAVENOUS at 11:34

## 2022-07-07 RX ADMIN — HYDROMORPHONE HYDROCHLORIDE 0.5 MG: 1 INJECTION, SOLUTION INTRAMUSCULAR; INTRAVENOUS; SUBCUTANEOUS at 14:17

## 2022-07-07 RX ADMIN — MORPHINE SULFATE 2 MG: 2 INJECTION, SOLUTION INTRAMUSCULAR; INTRAVENOUS at 14:51

## 2022-07-07 RX ADMIN — LIDOCAINE HYDROCHLORIDE 50 MG: 10 INJECTION, SOLUTION EPIDURAL; INFILTRATION; INTRACAUDAL; PERINEURAL at 11:34

## 2022-07-07 RX ADMIN — PROPOFOL 200 MG: 10 INJECTION, EMULSION INTRAVENOUS at 11:34

## 2022-07-07 RX ADMIN — INSULIN HUMAN 4 UNITS: 100 INJECTION, SOLUTION PARENTERAL at 09:50

## 2022-07-07 RX ADMIN — ONDANSETRON 4 MG: 2 INJECTION INTRAMUSCULAR; INTRAVENOUS at 15:17

## 2022-07-07 RX ADMIN — SODIUM CHLORIDE: 9 INJECTION, SOLUTION INTRAVENOUS at 09:34

## 2022-07-07 ASSESSMENT — PAIN SCALES - GENERAL
PAINLEVEL_OUTOF10: 0
PAINLEVEL_OUTOF10: 6
PAINLEVEL_OUTOF10: 6
PAINLEVEL_OUTOF10: 3
PAINLEVEL_OUTOF10: 1
PAINLEVEL_OUTOF10: 0
PAINLEVEL_OUTOF10: 4

## 2022-07-07 ASSESSMENT — PAIN DESCRIPTION - LOCATION: LOCATION: BREAST

## 2022-07-07 ASSESSMENT — PAIN DESCRIPTION - DESCRIPTORS
DESCRIPTORS: BURNING
DESCRIPTORS: SORE

## 2022-07-07 ASSESSMENT — PAIN - FUNCTIONAL ASSESSMENT
PAIN_FUNCTIONAL_ASSESSMENT: ACTIVITIES ARE NOT PREVENTED
PAIN_FUNCTIONAL_ASSESSMENT: 0-10

## 2022-07-07 ASSESSMENT — PAIN DESCRIPTION - ORIENTATION: ORIENTATION: LEFT;OUTER

## 2022-07-07 NOTE — ANESTHESIA PRE PROCEDURE
Department of Anesthesiology  Preprocedure Note       Name:  Abimbola Betts   Age:  56 y.o.  :  1975                                          MRN:  [id number]         Date:  2022      Surgeon: Shaniqua Rojas):  Jocelyn Martinez DO    Procedure: Procedure(s):  LEFT BREAST LUMPECTOMY AXILLARY SENTINEL NODE DISSECTION WITH NEOPROBE AND LOCALIZER    Medications prior to admission:   Prior to Admission medications    Medication Sig Start Date End Date Taking?  Authorizing Provider   tiZANidine (ZANAFLEX) 2 MG capsule TAKE 1 TABLET BY MOUTH AT BEDTIME AS NEEDED    Historical Provider, MD   Insulin Degludec (TRESIBA) 100 UNIT/ML SOLN Inject 14 Units into the skin daily     Historical Provider, MD   OZEMPIC, 0.25 OR 0.5 MG/DOSE, 2 MG/1.5ML SOPN INJECT 0.25MG ONCE WEEKLY AS DIRECTED 30 22   Historical Provider, MD   escitalopram (LEXAPRO) 10 MG tablet TAKE 1 TABLET BY MOUTH TWICE A DAY 22   Historical Provider, MD   naproxen (NAPROSYN) 500 MG tablet TAKE 1 TABLET BY MOUTH EVERY 12 HOURS WITH FOOD OR MILK AS NEEDED FOR 30 DAYS 22   Historical Provider, MD   clotrimazole-betamethasone (Velna ) 1-0.05 % cream Apply to affected area bid externally x 4 days then daily for 3 days  Patient not taking: Reported on 2022   Grisel Salvador, APRN - CNP       Current medications:    Current Facility-Administered Medications   Medication Dose Route Frequency Provider Last Rate Last Admin    0.9 % sodium chloride infusion   IntraVENous Continuous Elijah Garcia  mL/hr at 22 0934 New Bag at 22 0934    lactated ringers infusion   IntraVENous Continuous Elijah Garcia MD        sodium chloride flush 0.9 % injection 5-40 mL  5-40 mL IntraVENous 2 times per day Elijah Garcia MD        sodium chloride flush 0.9 % injection 5-40 mL  5-40 mL IntraVENous PRN Elijah Garcia MD        0.9 % sodium chloride infusion   IntraVENous PRN Elijah Garcia MD        ceFAZolin (ANCEF) 2000 mg in dextrose 5 % 50 mL IVPB  2,000 mg IntraVENous Substance Use Topics    Alcohol use: No                                Counseling given: Not Answered      Vital Signs (Current):   Vitals:    07/07/22 0918 07/07/22 0922   BP:  (!) 147/81   Pulse:  98   Resp:  16   Temp:  97 °F (36.1 °C)   TempSrc:  Infrared   SpO2:  98%   Weight: 213 lb (96.6 kg)    Height: 5' 4\" (1.626 m)                                               BP Readings from Last 3 Encounters:   07/07/22 (!) 147/81   06/22/22 (!) 156/80   06/09/22 (!) 142/97       NPO Status: Time of last liquid consumption: 2100                        Time of last solid consumption: 2100                        Date of last liquid consumption: 07/06/22                        Date of last solid food consumption: 07/06/22    BMI:   Wt Readings from Last 3 Encounters:   07/07/22 213 lb (96.6 kg)   06/22/22 215 lb (97.5 kg)   05/31/22 215 lb (97.5 kg)     Body mass index is 36.56 kg/m².     CBC:   Lab Results   Component Value Date/Time    WBC 13.9 06/22/2022 09:40 AM    RBC 4.84 06/22/2022 09:40 AM    HGB 13.4 06/22/2022 09:40 AM    HCT 40.8 06/22/2022 09:40 AM    MCV 84.3 06/22/2022 09:40 AM    RDW 13.0 06/22/2022 09:40 AM     06/22/2022 09:40 AM       CMP:   Lab Results   Component Value Date/Time     06/22/2022 09:40 AM    K 4.6 06/22/2022 09:40 AM    CL 97 06/22/2022 09:40 AM    CO2 24 06/22/2022 09:40 AM    BUN 10 06/22/2022 09:40 AM    CREATININE 0.58 06/22/2022 09:40 AM    GFRAA >60 06/22/2022 09:40 AM    LABGLOM >60 06/22/2022 09:40 AM    GLUCOSE 248 06/22/2022 09:40 AM    CALCIUM 9.3 06/22/2022 09:40 AM    ALT 28 07/11/2017 08:50 AM       POC Tests:   Recent Labs     07/07/22  0932   POCGLU 272*       Coags: No results found for: PROTIME, INR, APTT    HCG (If Applicable): No results found for: PREGTESTUR, PREGSERUM, HCG, HCGQUANT     ABGs: No results found for: PHART, PO2ART, EJT1RDZ, XAM3YLF, BEART, J2DRVFDE     Type & Screen (If Applicable):  No results found for: LABABO, LABRH    Drug/Infectious Status (If Applicable):  No results found for: HIV, HEPCAB    COVID-19 Screening (If Applicable): No results found for: COVID19        Anesthesia Evaluation  Patient summary reviewed and Nursing notes reviewed  Airway: Mallampati: III  TM distance: >3 FB   Neck ROM: full  Mouth opening: > = 3 FB   Dental: normal exam         Pulmonary:Negative Pulmonary ROS and normal exam                               Cardiovascular:Negative CV ROS          ECG reviewed                     ROS comment: -EKG - SR @ 78, LEFT ATRIAL ENLARGEMENT, RBBB     Neuro/Psych:   Negative Neuro/Psych ROS              GI/Hepatic/Renal:   (+) morbid obesity          Endo/Other:    (+) Diabetes, malignancy/cancer. ROS comment: -BREAST CANCER  -NPO AFTER MIDNIGHT  -ALLERGIES - TORADOL Abdominal:             Vascular: negative vascular ROS. Other Findings:           Anesthesia Plan      general     ASA 2     (LMA)  Induction: intravenous. MIPS: Postoperative opioids intended and Prophylactic antiemetics administered. Anesthetic plan and risks discussed with patient. Plan discussed with CRNA.     Attending anesthesiologist reviewed and agrees with Aura Ramirez MD   7/7/2022

## 2022-07-07 NOTE — H&P
[address]      Patient's Name/ Date of Birth/ Gender: April YAMEL Avila / 1975 (56 y.o.) / female     Attending physician: Kimani Park DO    CC: left breast invasive ductal CA    History of present Illness: Netta Hargrove is a 56 y.o. female, presents today for L breast lumpectomy and SLNB. 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 Past Surgical History:   Past Surgical History:   Procedure Laterality Date    BREAST SURGERY      biopsy     SECTION      x2    ENTEROCELE REPAIR  12    HYSTERECTOMY (CERVIX STATUS UNKNOWN)  12    LAPAROSCOPY  13    lysis of adhesions    TUBAL LIGATION      w/ mirena removal    US BREAST NEEDLE BIOPSY LEFT Left 2022    US BREAST NEEDLE BIOPSY LEFT 2022 [facility]    US GUIDED NEEDLE LOC BREAST ADDL LEFT Left 2022    US GUIDED NEEDLE LOC BREAST ADDL LEFT 2022 [facility]    US GUIDED NEEDLE LOC OF LEFT BREAST Left 2022    US GUIDED NEEDLE LOC OF LEFT BREAST 2022 [facility]       Social History:  reports that she has never smoked. She has never used smokeless tobacco. She reports that she does not drink alcohol and does not use drugs. Family History: family history includes Breast Cancer in her maternal aunt; Hypertension in her mother. Review of Systems:   General: Denies fever, chills, night sweats, weight loss, malaise, fatigue  HEENT: Denies sore throat, sinus problems, allergic rhinosinusitis  Card: Denies chest pain, palpitations, orthopnea/PND.  Denies h/o murmurs  Pulm: Denies cough, shortness of breath, JOVEL  GI:   Denies history of constipation, diarrhea, hematochezia or melena  : Denies polyuria, dysuria, hematuria  Endo: Denies diabetes, thyroid problems. Heme: Denies anemia, h/o bleeding or clotting problems. Neuro: Denies h/o CVA, TIA  Skin: Denies rashes, ulcers  Musculoskeletal: Denies muscle, joint, back pain. Allergies: Ketorolac    Current Meds:  Current Facility-Administered Medications:     0.9 % sodium chloride infusion, , IntraVENous, Continuous, Derek Peterson MD, Last Rate: 125 mL/hr at 07/07/22 0934, New Bag at 07/07/22 0934    lactated ringers infusion, , IntraVENous, Continuous, Derek Peterson MD    sodium chloride flush 0.9 % injection 5-40 mL, 5-40 mL, IntraVENous, 2 times per day, Derek Peterson MD    sodium chloride flush 0.9 % injection 5-40 mL, 5-40 mL, IntraVENous, PRN, Derek Peterson MD    0.9 % sodium chloride infusion, , IntraVENous, PRN, Derek Peterson MD    ceFAZolin (ANCEF) 2000 mg in dextrose 5 % 50 mL IVPB, 2,000 mg, IntraVENous, On Call to OR, Adán Wall DO    ceFAZolin (ANCEF) IVPB, , , ,     Facility-Administered Medications Ordered in Other Encounters:     technetium Tc 99m tilmanocept (LYMPHOSEEK) injection 2 millicurie, 2 millicurie, SubCUTAneous, ONCE PRN, Adán Wall DO, 2 millicurie at 28/99/70 2392    Vital Signs:  Vitals:    07/07/22 0922   BP: (!) 147/81   Pulse: 98   Resp: 16   Temp: 97 °F (36.1 °C)   SpO2: 98%       Physical Exam:  Vital signs and Nurse's note reviewed  Gen:  A&Ox3, NAD  HEENT: NCAT, PERRLA, EOMI, no scleral icterus, oral mucosa moist  Neck: Trachea midline without obvious masses or lesions  Chest: Symmetric rise with inhalation, no evidence of trauma  CVS: S1S2  Resp: Good bilateral air entry, no audible wheeze or rhonchi  Abd: soft, non-tender, non-distended, no hepatosplenomegaly or palpable masses  Ext: No clubbing, cyanosis, edema, peripheral pulses 2+ Rad/Fem/DP/PT  CNS: Moves all extremities, no gross focal motor deficits  Skin: No erythema or ulcerations         Assessment:    Netta Avila is a 56 y.o. female with     L breast invasive ductal CA    Plan:    1.  To OR for lumpectomy and SLNB, all questions answered, written informed consent obtained      Javier Mallory DO  7/7/2022

## 2022-07-07 NOTE — OP NOTE
Operative Note      Patient: April YAMEL Avila  YOB: 1975  MRN: [id number]    Date of Procedure: 7/7/2022    Pre-Op Diagnosis: L breast invasive ductal CA    Post-Op Diagnosis: Same       Procedure(s):  LEFT BREAST LUMPECTOMY AXILLARY SENTINEL NODE DISSECTION WITH NEOPROBE AND LOCALIZER    Surgeon(s):  Theresa Crespo DO    Assistant:   * No surgical staff found *    Anesthesia: General    Estimated Blood Loss (mL): less than 24HD    Complications: None    Specimens:   ID Type Source Tests Collected by Time Destination   A : Left axilla sentinel node  Tissue Tissue SURGICAL PATHOLOGY Theresa Crespo,  7/7/2022 1225    B : Left breast tissue, short suture superior, long suture lateral Tissue Breast SURGICAL PATHOLOGY Theresa Zak,  7/7/2022 1301    C : Addition medial margin    short suture marks new medial margin long suture marks deep margin Tissue Breast SURGICAL PATHOLOGY Theresaramesh Crespo, DO 7/7/2022 1317        Implants:  * No implants in log *      Drains: * No LDAs found *    Findings: as above. Wound class 1    Detailed Description of Procedure:     HISTORY: The patient is a 56y.o. year old female with history of above preop diagnosis. The risk, benefits, expected outcome, and alternatives to the procedure were explained to the patient's understanding and written informed consent was obtained. Radionuclide injection and tumor localization was performed per Radiology department prior to procedure. Local analgesia with 1% lidocaine and 0.25% marcaine without epinephrine. OPERATIVE DETAIL    The patient was brought to the operating suite, was transferred to the operating table in supine position. Timeout was performed verifying correct patient, position, equipment and procedure to be performed. EPC cuffs were applied. Preoperative antibiotics were infused. General anesthesia was administered, endotracheal intubation was performed.      2cc of methylene blue was injected intradermally in 4 quadrants around the areolar border and the breast massaged for several minutes. The patient's left chest, axilla and upper extremity was prepped and draped in the usual sterile fashion. Attention was turned to the Left axilla for sentinel node biopsy. Incision was made in a natural skin crease with #15 scalpel for a total length of 4 cm.  Substrate used to identify sentinel node: methylene blue and radiocolloid injection   All nodes (colored or noncolored) present at the end of a dye-filled lymphatic channel were removed: Yes   All significantly radioactive nodes were removed: Yes  o No additional lymph nodes were found to register within 10% of the highest recorded reading (1264)   All palpably suspicious nodes were removed: SLN was quite large, partially infiltrated with methylene blue, adjacent LN was palpably suspicious and removed as well      Hemostasis was excellent. The cavity was irrigated with sterile saline. The incision was closed in multiple layers. The deep tissues were reapproximated with 3-0 Vicryl. The deep dermal layers were closed with interrupted 3-0 Vicryl and the incision closed with a running subcuticular stitch of 4-0 Monocryl. Incision was covered with skin glue. Attention was turned to the breast tissue. Curvilinear incision was made with #15 scalpel for a total length of 6 cm. LOCalizer detection device was used as a guide to dissect the tissue of interest, once excised the tissue was marked for orientation then sent to Radiology for specimen radiograph. Word was received from Radiology, the initial lumpectomy contained the known malignancy as well as RFID tag and biopsy clip, this specimen was missing the additionally placed hydromark clip in the adjacent tissue.  Additional tissue for the medial margin was excised as it was believed to contain the additional biopsy clip, the clip was ultimately not excised as a generous lumpectomy had already been

## 2022-07-07 NOTE — ANESTHESIA POSTPROCEDURE EVALUATION
Department of Anesthesiology  Postprocedure Note    Patient: Netta Avila  MRN: [id number]  YOB: 1975  Date of evaluation: 7/7/2022      Procedure Summary     Date: 07/07/22 Room / Location: [facility] OR 01 / [address] [place] [address]    Anesthesia Start: 0385 Anesthesia Stop: 1419    Procedure: LEFT BREAST LUMPECTOMY AXILLARY SENTINEL NODE DISSECTION WITH NEOPROBE AND LOCALIZER (Left Breast) Diagnosis:       History of left breast cancer      (History of left breast cancer [Z85.3])    Surgeons: Demond Abdi DO Responsible Provider: Luis Fernando Vela MD    Anesthesia Type: general ASA Status: 2          Anesthesia Type: No value filed.     Steven Phase I: Steven Score: 8    Steven Phase II:        Anesthesia Post Evaluation    Patient location during evaluation: PACU  Patient participation: complete - patient participated  Level of consciousness: awake and alert  Airway patency: patent  Nausea & Vomiting: no nausea and no vomiting  Complications: no  Cardiovascular status: hemodynamically stable  Respiratory status: nasal cannula and spontaneous ventilation  Hydration status: euvolemic  Multimodal analgesia pain management approach

## 2022-07-11 ENCOUNTER — TELEPHONE (OUTPATIENT)
Dept: ONCOLOGY | Age: 47
End: 2022-07-11

## 2022-07-11 NOTE — TELEPHONE ENCOUNTER
Name: Netta Avila  : 1975  MRN: [id number]    Oncology Navigation Follow-Up Note    Contact Type:  Telephone    Notes: Writer spoke with pt for ONN f/u post op. She reports she had a reaction to the Norco, face became flushed and she felt hot. She discontinued this and has been using Tylenol and ice which seems to be controling her pain. She denied fever/chills, bleeding at this time. Encouraged her to call with any future concerns/questions. Pt is aware of upcoming appts. Provided writer's contact information. Will continue to follow.      Electronically signed by Hamlet Mackey RN on 2022 at 12:32 PM

## 2022-07-12 LAB — SURGICAL PATHOLOGY REPORT: NORMAL

## 2022-07-13 ENCOUNTER — TELEPHONE (OUTPATIENT)
Dept: ONCOLOGY | Age: 47
End: 2022-07-13

## 2022-07-13 NOTE — TELEPHONE ENCOUNTER
Name: Netta Avila  : 1975  MRN: [id number]    Oncology Navigation Follow-Up Note    Contact Type:  Telephone    Notes: Writer noted path is back, Oncotype will be sent per Dr Cynthia Turcios verbal order. Lanette Gama notified in office to fax order. [facility] triage notified via  that navigator sent Oncotype. Will continue to follow.      Electronically signed by Redia Gosselin, RN on 2022 at 7:33 AM

## 2022-07-18 ENCOUNTER — TELEPHONE (OUTPATIENT)
Dept: ONCOLOGY | Age: 47
End: 2022-07-18

## 2022-07-18 NOTE — TELEPHONE ENCOUNTER
Notified by [facility] triage that had a message from Onc[facility] that they did not receive the order with the fax. Refaxed.

## 2022-07-19 ENCOUNTER — OFFICE VISIT (OUTPATIENT)
Dept: SURGERY | Age: 47
End: 2022-07-19

## 2022-07-19 VITALS
HEART RATE: 90 BPM | WEIGHT: 213 LBS | BODY MASS INDEX: 36.37 KG/M2 | RESPIRATION RATE: 16 BRPM | OXYGEN SATURATION: 100 % | HEIGHT: 64 IN

## 2022-07-19 DIAGNOSIS — R92.8 ABNORMAL MAGNETIC RESONANCE IMAGING OF LEFT BREAST: ICD-10-CM

## 2022-07-19 DIAGNOSIS — R92.8 ABNORMAL MAGNETIC RESONANCE IMAGING OF RIGHT BREAST: Primary | ICD-10-CM

## 2022-07-19 PROCEDURE — 99024 POSTOP FOLLOW-UP VISIT: CPT | Performed by: SURGERY

## 2022-07-19 NOTE — PROGRESS NOTES
[facility] General Surgery Clinic  Progress Note    PATIENT NAME: Netta Avila     CLINIC VISIT DATE: 7/19/2022    SUBJECTIVE:  Netta Power is a 56 y.o. female who presents to the clinic today for follow up to left breast lumpectomy and SLNB performed 7/7/22. No new issues since surgery, pt is tolerating regular diet and having normal BM. Pathology as follows:    -- Diagnosis --     A. Left axillary sentinel lymph node, biopsy:     -  Negative for metastatic carcinoma (0/1). B.  Left breast, excisional lumpectomy:     -  Invasive ductal carcinoma, grade 2.     -  Invasive carcinoma is 1.8 cm.     -  Previously determined to be estrogen receptor positive,   progesterone receptor positive, and HER2 not amplified by FISH. -  High-grade ductal carcinoma in situ.     -  Organizing biopsy site change. -  Margins are free of carcinoma (considering additional margin   tissue, part C). C.  Left breast, additional medial margin:     -  High-grade ductal carcinoma in situ. -  Margins are free of involvement by ductal carcinoma in situ. OBJECTIVE:    Pulse 90   Resp 16   Ht 5' 4\" (1.626 m)   Wt 213 lb (96.6 kg)   LMP 11/24/2011   SpO2 100%   BMI 36.56 kg/m²     General Appearance:  awake, alert, no acute distress, well developed, well nourished   Skin:  L breast and axilla incisions C/D/I  Lungs:  Normal chest expansion, unlabored breathing without accessory muscle use. No audible rales, rhonchi, or wheezing. Cardiovascular: S1S2. No evidence of JVD. No evidence of pulsatile masses in abdomen  Abdomen:  Soft, non-tender, no organomegaly, no masses. Musculoskeletal: No evidence of bony/muscular deformities, trauma, atrophy of either left/right upper/lower extremity. No evidence of digital clubbing or cyanosis. ASSESSMENT:  1. Abnormal magnetic resonance imaging of right breast    2.  Abnormal magnetic resonance imaging of left breast          PLAN:  Awaiting oncotype results, we discussed the details of port placement if/when indicated  Plan f/u US of the left breast in 3 months for surveillance of the small area of concern not able to be resected during lumpectomy, pt expresses understanding of this      Electronically signed by Victoria Jackson DO on 7/19/2022 at 9:17 AM

## 2022-07-19 NOTE — LETTER
[facility]  Surgical Specialties - General Surgery  [address]  [place], [facility] [address]  Phone: [phone]  Fax: [phone]      22    Patient: Netta Bledsoe  MRN: [id number]  : 1975  Date of visit: 2022    Dear Coral Chavez:      I saw Netta Avila in follow up to left breast lumpectomy and SLNB. Below are the relevant portions of my assessment and plan of care. If you have questions, please do not hesitate to call me. I look forward to following Netta Avila along with you.     Sincerely,        Ned Cortez DO

## 2022-07-20 NOTE — TELEPHONE ENCOUNTER
Checked [facility] portal and patient is not listed as order received. I called [employer] and spoke with Brianna Merino and he states that he does not show an active order. I told him that the orders were faxed multiple times and was given dates off the fax confirmations. Brianna Merino gave me alternate fax number of [phone] and email of [email or web address] to send the order to. Order faxed to alternate fax number and emailed to [employer].

## 2022-07-22 ENCOUNTER — OFFICE VISIT (OUTPATIENT)
Dept: ONCOLOGY | Age: 47
End: 2022-07-22
Payer: COMMERCIAL

## 2022-07-22 ENCOUNTER — TELEPHONE (OUTPATIENT)
Dept: ONCOLOGY | Age: 47
End: 2022-07-22

## 2022-07-22 VITALS
HEART RATE: 112 BPM | BODY MASS INDEX: 36.6 KG/M2 | DIASTOLIC BLOOD PRESSURE: 70 MMHG | SYSTOLIC BLOOD PRESSURE: 144 MMHG | WEIGHT: 213.2 LBS | TEMPERATURE: 97.1 F

## 2022-07-22 DIAGNOSIS — C50.412 MALIGNANT NEOPLASM OF UPPER-OUTER QUADRANT OF LEFT BREAST IN FEMALE, ESTROGEN RECEPTOR POSITIVE (HCC): Primary | ICD-10-CM

## 2022-07-22 DIAGNOSIS — Z17.0 MALIGNANT NEOPLASM OF UPPER-OUTER QUADRANT OF LEFT BREAST IN FEMALE, ESTROGEN RECEPTOR POSITIVE (HCC): Primary | ICD-10-CM

## 2022-07-22 PROCEDURE — 99211 OFF/OP EST MAY X REQ PHY/QHP: CPT | Performed by: INTERNAL MEDICINE

## 2022-07-22 PROCEDURE — 99214 OFFICE O/P EST MOD 30 MIN: CPT | Performed by: INTERNAL MEDICINE

## 2022-07-22 NOTE — TELEPHONE ENCOUNTER
AVS from 7/22/22    Cotype Dx test  Further recommendations based on results    Notified triage nurse for oncotype test    Pt was given AVS and appointment schedule     Electronically signed by Tawny Rodriguez on 7/22/2022 at 1:30 PM

## 2022-07-28 ENCOUNTER — TELEPHONE (OUTPATIENT)
Dept: ONCOLOGY | Age: 47
End: 2022-07-28

## 2022-07-28 NOTE — TELEPHONE ENCOUNTER
Name: Netta Avila  : 1975  MRN: [id number]    Oncology Navigation Follow-Up Note    Contact Type:  Telephone    Notes: Reviewed dashboard for Oncotype results. Expected date for results is . Will continue to follow.      Electronically signed by Tana Guevara RN on 2022 at 1:33 PM

## 2022-08-07 NOTE — PROGRESS NOTES
Chief Complaint   Patient presents with    Follow-up     Review status of disease    Other     SX f/ u  What stage am I in? What is the next step moving forward? 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 ER positive, AR positive, HER2/checo not amplified. CURRENT THERAPY:         S/p lumpectomy and sentinel LN biopsy 22  BRIEF CASE HISTORY:       Ms. Ron Myles is a very pleasant 56 y.o. female with no major past health problems. . She was doing fine with routine mammogram being normal until this year where she had suspicious abnormalities in the mammogram done on 2022. Diagnostic mammogram 2022 showed area of architectural distortion at the left breast upper outer quadrant. . A biopsy was performed on 2022 and unfortunately that was positive for invasive ductal carcinoma. ER positive, AR positive, HER2/checo not amplified. The patient is seen today for further management of her breast cancer. The patient did very well after her biopsy without any complication. The patient had no symptom preceding her diagnosis of cancer. No chest pain, shortness of breath, cough, sputum or hemoptysis, no back pain or terry aches, no weight loss or decreased appetite, no fever or night sweating. No headaches, and no other complaints. INTERIM HISTORY:   Seen for follow up breast cancer. She had lumpectomy 22. No complications. No bleeding or infection. No complaints at the present time. GYNECOLOGICAL HISTORY  Menarche at age 16. Hysterectomy age 29, one ovary, endometriosis. +0. Age at first full term pregnancy 33. No use of HRT. PAST MEDICAL HISTORY: has a past medical history of Anxiety, Carcinoma of upper-outer quadrant of left breast in female, estrogen receptor positive ([address] Endometriosis, GDM (gestational diabetes mellitus), and H/O hysterectomy for benign disease.     PAST SURGICAL HISTORY: has a past surgical history that includes Tubal ligation ();  section; Hysterectomy (13); Enterocele repair (12); laparoscopy (13); US BREAST BIOPSY W LOC DEVICE 1ST LESION LEFT (Left, 2022); US PLACE BREAST LOC DEVICE 1ST LESION LEFT (Left, 2022); US PLACE BREAST LOC DEVICE EACH ADDL LEFT (Left, 2022); Breast surgery; Breast lumpectomy (Left, 2022); and Mastectomy (Left, 2022). CURRENT MEDICATIONS:  has a current medication list which includes the following prescription(s): docusate sodium, tizanidine, tresiba, ozempic (0.25 or 0.5 mg/dose), escitalopram, naproxen, and clotrimazole-betamethasone. ALLERGIES:  is allergic to ketorolac. FAMILY HISTORY: aunt 76s breast cancer, g father leukemia. Otherwise negative for any hematological or oncological conditions. SOCIAL HISTORY:  reports that she has never smoked. She has never used smokeless tobacco. She reports that she does not drink alcohol and does not use drugs. REVIEW OF SYSTEMS:     General: No weakness or fatigue. No unanticipated weight loss or decreased appetite. No fever or chills. Eyes: No blurred vision, eye pain or double vision. Ears: No hearing problems or drainage. No tinnitus. Throat: No sore throat, problems with swallowing or dysphagia. Respiratory: No cough, sputum or hemoptysis. No shortness of breath. No pleuritic chest pain. Cardiovascular: No chest pain, orthopnea or PND. No lower extremity edema. No palpitation. Gastrointestinal: No problems with swallowing. No abdominal pain or bloating. No nausea or vomiting. No diarrhea or constipation. No GI bleeding. Genitourinary: No dysuria, hematuria, frequency or urgency. Musculoskeletal: No muscle aches or pains. No limitation of movement. No back pain. No gait disturbance, No joint complaints. Dermatologic: No skin rashes or pruritus. No skin lesions or discolorations.    Psychiatric: No depression, anxiety, or stress or signs of schizophrenia. No change in mood or affect. Hematologic: No history of bleeding tendency. No bruises or ecchymosis. No history of clotting problems. Infectious disease: No fever, chills or frequent infections. Endocrine: No polydipsia or polyuria. No temperature intolerance. Neurologic: No headaches or dizziness. No weakness or numbness of the extremities. No changes in balance, coordination,  memory, mentation, behavior. Allergic/Immunologic: No nasal congestion or hives. No repeated infections. PHYSICAL EXAM:  The patient is not in acute distress. Vital signs: Blood pressure (!) 144/70, pulse (!) 112, temperature 97.1 °F (36.2 °C), temperature source Temporal, weight 213 lb 3.2 oz (96.7 kg), last menstrual period 11/24/2011, not currently breastfeeding. HEENT:  Eyes are normal. Ears, nose and throat are normal.  Neck: Supple. No lymph node enlargement. No thyroid enlargement. Trachea is centrally located. Chest:  Clear to auscultation. No wheezes or crepitations. Breast:  Left breast s/p lumpectomy. No masses. No skin or nippple abnormalities. No palpable lymph nodes. Heart: Regular sinus rhythm. Abdomen: Soft, nontender. No hepatosplenomegaly. No masses. Extremities:  With no edema. Lymph Nodes:  No cervical, axillary or inguinal lymph node enlargement. Neurologic:  Conscious and oriented. No focal neurological deficits. Psychosocial: No depression, anxiety or stress. Skin: No rashes, bruises or ecchymoses.       Review of Diagnostic data:   Lab Results   Component Value Date    WBC 13.9 (H) 06/22/2022    HGB 13.4 06/22/2022    HCT 40.8 06/22/2022    MCV 84.3 06/22/2022     06/22/2022       Chemistry        Component Value Date/Time     (L) 06/22/2022 0940    K 4.6 06/22/2022 0940    CL 97 (L) 06/22/2022 0940    CO2 24 06/22/2022 0940    BUN 10 06/22/2022 0940    CREATININE 0.58 06/22/2022 0940        Component Value Date/Time    CALCIUM 9.3 06/22/2022 0940 ALT 28 07/11/2017 0850          [facility] BREAST SPECIMEN  Narrative: EXAMINATION:  SPECIMEN RADIOGRAPH x2 7/7/2022    TECHNIQUE:  Single radiograph of the surgical specimen was obtained intraoperatively x2. COMPARISON:  Mammogram dated 07/06/2022    HISTORY:  Specimen radiograph post lumpectomy. ORDERING SYSTEM PROVIDED HISTORY: Left  Breast Specimen  TECHNOLOGIST PROVIDED HISTORY:  Left Breast Specimen  Is the patient pregnant?->No    FINDINGS:  Specimen 1: Specimen radiograph is submitted intraoperatively. HydroMARK butterfly clip at the site of biopsy-proven malignancy is in the  specimen. RF ID tag is in the specimen. HydroMARK open coil clip is not  seen within the specimen. Biopsy clip coordinates are 2-C. Specimen 2: Additional tissue was obtained and radiographed. No other biopsy  clips are seen within this 2nd specimen. Impression: 1st specimen radiograph demonstrates the targeted HydroMARK butterfly clip  (biopsy-proven malignancy), RF ID tag, and mass. The open coil clip is not  included in the specimen. HydroMARK open coil clip was placed at the site of  possible satellite lesion (although never proven with biopsy). Short-term  follow-up ultrasound is recommended in 3 months to re-evaluate the area. Findings called to the operating room at approximately 12:11 on 7/7/2022    No additional clip seen in the 2nd submitted specimen. Findings called to the operating room at approximately 1232 on 7/7/2022  [facility] BREAST SPECIMEN  Narrative: EXAMINATION:  SPECIMEN RADIOGRAPH x2 7/7/2022    TECHNIQUE:  Single radiograph of the surgical specimen was obtained intraoperatively x2. COMPARISON:  Mammogram dated 07/06/2022    HISTORY:  Specimen radiograph post lumpectomy. ORDERING SYSTEM PROVIDED HISTORY: Left  Breast Specimen  TECHNOLOGIST PROVIDED HISTORY:  Left Breast Specimen  Is the patient pregnant?->No    FINDINGS:  Specimen 1: Specimen radiograph is submitted intraoperatively.     HydroMARK was  made to place a HydroMARK biopsy clip at the satellite lesion in order to  ensure excision at the time of lumpectomy. Procedure 1: HydroMARK clip placement at the satellite lesion in the 2  o'clock left breast, approximately 8 cm from the nipple. The patient's left  breast was prepped in the usual sterile fashion and 2% buffered lidocaine was  used for local anesthesia. Small skin nick was made. Using lateral  approach, the needle was advanced under ultrasound guidance to the suspected  shadowing breast mass and a HydroMARK open coil clip was deployed. Needle  was removed and sterile dressing applied. Procedure 2: RF ID tag placement at the known malignancy in the 2-3 o'clock  left breast.  The patient's left breast was prepped in the usual sterile  fashion and 2% buffered lidocaine was used for local anesthesia. Small skin  nick was made. Using a superior approach, the 12 gauge Hologic localizer  deployment needle was advanced under ultrasound guidance to the previously  biopsied mass and RF ID tag [id number] was deployed. Needle was removed and  sterile dressing applied. Postprocedure evaluation demonstrates a good  signal from the RF ID tag. The patient tolerated the procedure well with no immediate complications and  was transferred to nuclear medicine for continued care. COMPARISON:  Mammogram dated 05/11/2022 and 05/17/2022. Ultrasound dated 05/17/2022. Mammogram dated 05/19/2022. MRI dated 06/07/2022. Ultrasound dated  06/09/2022. HISTORY:  ORDERING SYSTEM PROVIDED HISTORY: Status post breast biopsy  TECHNOLOGIST PROVIDED HISTORY:  Is the patient pregnant?->No; ORDERING SYSTEM PROVIDED HISTORY: Abnormal  mammogram    FINDINGS:  The postprocedure left mammogram confirmed good RF ID tag localization of the  known malignancy in the 2-3 o'clock left breast.  The RF ID tag is adjacent  to the biopsy clip at the superolateral margin of the mass.     Also noted on the postprocedure placement at the satellite lesion in the 2  o'clock left breast, approximately 8 cm from the nipple. The patient's left  breast was prepped in the usual sterile fashion and 2% buffered lidocaine was  used for local anesthesia. Small skin nick was made. Using lateral  approach, the needle was advanced under ultrasound guidance to the suspected  shadowing breast mass and a HydroMARK open coil clip was deployed. Needle  was removed and sterile dressing applied. Procedure 2: RF ID tag placement at the known malignancy in the 2-3 o'clock  left breast.  The patient's left breast was prepped in the usual sterile  fashion and 2% buffered lidocaine was used for local anesthesia. Small skin  nick was made. Using a superior approach, the 12 gauge Hologic localizer  deployment needle was advanced under ultrasound guidance to the previously  biopsied mass and RF ID tag [id number] was deployed. Needle was removed and  sterile dressing applied. Postprocedure evaluation demonstrates a good  signal from the RF ID tag. The patient tolerated the procedure well with no immediate complications and  was transferred to nuclear medicine for continued care. COMPARISON:  Mammogram dated 05/11/2022 and 05/17/2022. Ultrasound dated 05/17/2022. Mammogram dated 05/19/2022. MRI dated 06/07/2022. Ultrasound dated  06/09/2022. HISTORY:  ORDERING SYSTEM PROVIDED HISTORY: Status post breast biopsy  TECHNOLOGIST PROVIDED HISTORY:  Is the patient pregnant?->No; ORDERING SYSTEM PROVIDED HISTORY: Abnormal  mammogram    FINDINGS:  The postprocedure left mammogram confirmed good RF ID tag localization of the  known malignancy in the 2-3 o'clock left breast.  The RF ID tag is adjacent  to the biopsy clip at the superolateral margin of the mass.     Also noted on the postprocedure mammogram is the position of the [facility]  open coil clip placed at the satellite lesion in the 2 o'clock left breast.  The HydroMARK open coil clip is sterile fashion and 2% buffered lidocaine was  used for local anesthesia. Small skin nick was made. Using lateral  approach, the needle was advanced under ultrasound guidance to the suspected  shadowing breast mass and a HydroMARK open coil clip was deployed. Needle  was removed and sterile dressing applied. Procedure 2: RF ID tag placement at the known malignancy in the 2-3 o'clock  left breast.  The patient's left breast was prepped in the usual sterile  fashion and 2% buffered lidocaine was used for local anesthesia. Small skin  nick was made. Using a superior approach, the 12 gauge Hologic localizer  deployment needle was advanced under ultrasound guidance to the previously  biopsied mass and RF ID tag [id number] was deployed. Needle was removed and  sterile dressing applied. Postprocedure evaluation demonstrates a good  signal from the RF ID tag. The patient tolerated the procedure well with no immediate complications and  was transferred to nuclear medicine for continued care. COMPARISON:  Mammogram dated 05/11/2022 and 05/17/2022. Ultrasound dated 05/17/2022. Mammogram dated 05/19/2022. MRI dated 06/07/2022. Ultrasound dated  06/09/2022. HISTORY:  ORDERING SYSTEM PROVIDED HISTORY: Status post breast biopsy  TECHNOLOGIST PROVIDED HISTORY:  Is the patient pregnant?->No; ORDERING SYSTEM PROVIDED HISTORY: Abnormal  mammogram    FINDINGS:  The postprocedure left mammogram confirmed good RF ID tag localization of the  known malignancy in the 2-3 o'clock left breast.  The RF ID tag is adjacent  to the biopsy clip at the superolateral margin of the mass. Also noted on the postprocedure mammogram is the position of the [facility]  open coil clip placed at the satellite lesion in the 2 o'clock left breast.  The HydroMARK open coil clip is approximately 1.5 cm deep and 2.5 cm superior  to the known malignant mass. Impression: 1.   Ultrasound-guided HydroMARK open coil clip placement at the suspected  satellite lesion in the 2 o'clock left breast (never biopsied). 2.  Ultrasound-guided RF ID tag placement at the known malignancy in the 2-3  o'clock left breast.    Please note that the lumpectomy specimen should include both a HydroMARK open  coil clip and HydroMARK butterfly clip as well as the RF ID tag and  surrounding tissue. The open coil clip is approximately 2.5 cm superior  (further from nipple) relative to the butterfly clip and RF ID tag. Core needle biopsy 5/19/2022 of left breast 2 o'clock position:  Final Diagnosis   LEFT BREAST, 2:00, ULTRASOUND-GUIDED CORE NEEDLE BIOPSIES:   - INVASIVE, WELL DIFFERENTIATED DUCTAL CARCINOMA. - ESTROGEN RECEPTOR POSITIVE. - PROGESTERONE RECEPTOR POSITIVE.   - SEE COMMENT. 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 ER positive, DE positive, HER2/checo not amplified. PLAN: I again explained to the patient and her companions the nature of breast cancer, staging, prognosis and treatment. The pathology results were reviewed and explained in layman language. Patient presents with early stage malignancy with good biological markers. Tumor size is 1.8 cm. Lumpectomy will be followed by radiation therapy . However, due to hormone receptor status and the tumor size, she will be candidate for endocrine therapy due to positive hormone receptor and she will need Oncotype DX to look further at her risk of recurrence and to decide about role of chemotherapy treatment. Will send for Oncotype Dx today and will watch for results to make further recommendations. Patient's questions were answered to the best of her satisfaction and she verbalized full understanding and agreement.

## 2022-08-09 ENCOUNTER — TELEPHONE (OUTPATIENT)
Dept: ONCOLOGY | Age: 47
End: 2022-08-09

## 2022-08-09 NOTE — TELEPHONE ENCOUNTER
Name: Netta Avila  : 1975  MRN: [id number]    Oncology Navigation Follow-Up Note    Contact Type:  Telephone    Notes: Writer spoke with pt for ONN f/u. Reviewed Oncotype results with pt and that Dr Yusra Marcano stated no chemo needed, f/u with RO asap and MO in 6-8 weeks. Writer assisted in scheduling pt for RO appt. Pt was given option for today or Monday. Pt prefers to be seen on Monday. Bart Farris, notified. Writer sent message to MO schedulers to make pt a 6-8 week f/u post RT with Dr Nohemi Andrews. Pt denied further barriers and any unanswered questions at this time. Pt has writer's contact information and encouraged to contact writer with any concerns. Will continue to follow.      Electronically signed by Emory Middleton RN on 2022 at 10:43 AM

## 2022-08-15 ENCOUNTER — HOSPITAL ENCOUNTER (OUTPATIENT)
Dept: RADIATION ONCOLOGY | Age: 47
Discharge: HOME | End: 2022-08-15
Payer: COMMERCIAL

## 2022-08-15 ENCOUNTER — TELEPHONE (OUTPATIENT)
Dept: ONCOLOGY | Age: 47
End: 2022-08-15

## 2022-08-15 VITALS
WEIGHT: 213.2 LBS | HEART RATE: 86 BPM | SYSTOLIC BLOOD PRESSURE: 155 MMHG | BODY MASS INDEX: 36.6 KG/M2 | TEMPERATURE: 97.7 F | DIASTOLIC BLOOD PRESSURE: 95 MMHG | RESPIRATION RATE: 16 BRPM

## 2022-08-15 DIAGNOSIS — C50.412 MALIGNANT NEOPLASM OF UPPER-OUTER QUADRANT OF LEFT BREAST IN FEMALE, ESTROGEN RECEPTOR POSITIVE (HCC): Primary | ICD-10-CM

## 2022-08-15 DIAGNOSIS — Z17.0 MALIGNANT NEOPLASM OF UPPER-OUTER QUADRANT OF LEFT BREAST IN FEMALE, ESTROGEN RECEPTOR POSITIVE (HCC): Primary | ICD-10-CM

## 2022-08-15 PROCEDURE — 99212 OFFICE O/P EST SF 10 MIN: CPT | Performed by: RADIOLOGY

## 2022-08-15 PROCEDURE — 99214 OFFICE O/P EST MOD 30 MIN: CPT | Performed by: RADIOLOGY

## 2022-08-15 NOTE — PROGRESS NOTES
April L Margarita  8/15/2022  2:40 PM      Vitals:    08/15/22 1415   BP: (!) 155/95   Pulse: 86   Resp: 16   Temp: 97.7 °F (36.5 °C)    :     Pain Assessment: None - Denies Pain          Wt Readings from Last 1 Encounters:   08/15/22 213 lb 3.2 oz (96.7 kg)                Current Outpatient Medications:     docusate sodium (COLACE) 100 MG capsule, Take 1 capsule by mouth 2 times daily (Patient not taking: Reported on 8/15/2022), Disp: 20 capsule, Rfl: 0    tiZANidine (ZANAFLEX) 2 MG capsule, TAKE 1 TABLET BY MOUTH AT BEDTIME AS NEEDED (Patient not taking: Reported on 8/15/2022), Disp: , Rfl:     Insulin Degludec (TRESIBA) 100 UNIT/ML SOLN, Inject 14 Units into the skin daily , Disp: , Rfl:     OZEMPIC, 0.25 OR 0.5 MG/DOSE, 2 MG/1.5ML SOPN, INJECT 0.25MG ONCE WEEKLY AS DIRECTED 30, Disp: , Rfl:     escitalopram (LEXAPRO) 10 MG tablet, TAKE 1 TABLET BY MOUTH TWICE A DAY, Disp: , Rfl:     naproxen (NAPROSYN) 500 MG tablet, TAKE 1 TABLET BY MOUTH EVERY 12 HOURS WITH FOOD OR MILK AS NEEDED FOR 30 DAYS (Patient not taking: Reported on 8/15/2022), Disp: , Rfl:     clotrimazole-betamethasone (LOTRISONE) 1-0.05 % cream, Apply to affected area bid externally x 4 days then daily for 3 days (Patient not taking: Reported on 7/22/2022), Disp: 45 g, Rfl: 1               FALLS RISK SCREEN  Instructions:  Assess the patient and enter the appropriate indicators that are present for fall risk identification. Total the numbers entered and assign a fall risk score from Table 2.  Reassess patient at a minimum every 12 weeks or with status change. Assessment   Date  8/15/2022     1. Mental Ability: confusion/cognitively impaired 0     2. Elimination Issues: incontinence, frequency 0       3. Ambulatory: use of assistive devices (walker, cane, off-loading devices),        attached to equipment (IV pole, oxygen) 0     4. Sensory Limitations: dizziness, vertigo, impaired vision 0     5. Age less than 65        0     6.   Age 72 or greater 0     7. Medication: diuretics, strong analgesics, hypnotics, sedatives,        antihypertensive agents 0   8. Falls:  recent history of falls within the last 3 months (not to include slipping or        tripping) 0   TOTAL 0    If score of 4 or greater was education given? No           TABLE 2   Risk Score Risk Level Plan of Care   0-3 Little or  No Risk 1. Provide assistance as indicated for ambulation activities  2. Reorient confused/cognitively impaired patient  3. Chair/bed in low position, stretcher/bed with siderails up except when performing patient care activities  5. Educate patient/family/caregiver on falls prevention  6.  Reassess in 12 weeks or with any noted change in patient condition which places them at a risk for a fall   4-6 Moderate Risk 1. Provide assistance as indicated for ambulation activities  2. Reorient confused/cognitively impaired patient  3. Chair/bed in low position, stretcher/bed with siderails up except when performing patient care activities  4. Educate patient/family/caregiver on falls prevention     7 or   Higher High Risk 1. Place patient in easily observable treatment room  2. Patient attended at all times by family member or staff  3. Provide assistance as indicated for ambulation activities  4. Reorient confused/cognitively impaired patient  5. Chair/bed in low position, stretcher/bed with siderails up except when performing patient care activities  6. Educate patient/family/caregiver on falls prevention         PLAN: Patient is seen today in follow up. She states she is healing well since her lumpectomy with occasional left axilla area discomfort that is worse if she is more active physically. Oncotype score was reviewed as well today and per med onc chemotherapy is not indicated. Patient signed consent and scheduled simulation after follow up complete today.           Sendy Gloria RN

## 2022-08-15 NOTE — TELEPHONE ENCOUNTER
Name: Netta Avila  : 1975  MRN: [id number]    Oncology Navigation Follow-Up Note    Contact Type:  Telephone    Notes:Writer spoke with pt for ONN during her Rad/Onc f/u. She reports she is doing well. Pt denied further barriers and any unanswered questions at this time. Pt has writer's contact information and encouraged to contact writer with any concerns. Will continue to follow.      Electronically signed by Philip Guzman RN on 8/15/2022 at 4:10 PM

## 2022-08-16 NOTE — PROGRESS NOTES
[address]            Radiation Oncology          [address] [facility]          [address]        Pedro Maillard: [phone]        F: [phone]       [facility] [address] [place] [address]       Radiation Oncology   [address] [facility], [address] [facility]       Pedro Maillard: [phone]       F: [phone]       [email or web address]      Date of Service: 8/15/2022     Location:  [facility],   [address] [facility] [place]   [phone]        RADIATION ONCOLOGY FOLLOW UP NOTE    Patient ID:   Netta Avila  : 1975   MRN: [id number]    DIAGNOSIS:  Cancer Staging  Malignant neoplasm of upper-outer quadrant of left breast in female, estrogen receptor positive ([facility] [address]  Staging form: Breast, AJCC 8th Edition  - Clinical stage from 2022: Stage IA (cT1, cN0, cM0, G1, ER+, LA+, HER2-) - Signed by Romy Leal MD on 2022    -s/p Lump 22   -oncotype 12    INTERVAL HISTORY:   Netta Hargrove is a 56 y.o. Alina Karst female with a history of a left-sided breast cancer for which she initially presented to the multivisceral breast cancer clinic and was recommended to undergo breast conservation surgery. Patient had surgery on 2022 which revealed a 1.8 cm grade 2 invasive ductal carcinoma ER/LA positive HER2 negative which was removed with negative margins and also found to have a negative sentinel lymph node biopsy. Patient also had genetic testing done prior surgery which came back negative. She did have Oncotype testing which came back at 12 and no chemotherapy was recommended. Patient comes in today to discuss her adjuvant radiation therapy treatment course. She does have some breast sensitivity but denies any swelling or skin irritation. She denies any headaches, dizziness, chest pain, shortness of breath, abdominal pain, nausea, diarrhea, bony pain, swelling, weight loss, fatigue, or bleeding.   She does note some difficulty with range of motion in her left shoulder. MEDICATIONS:    Current Outpatient Medications:     docusate sodium (COLACE) 100 MG capsule, Take 1 capsule by mouth 2 times daily (Patient not taking: Reported on 8/15/2022), Disp: 20 capsule, Rfl: 0    tiZANidine (ZANAFLEX) 2 MG capsule, TAKE 1 TABLET BY MOUTH AT BEDTIME AS NEEDED (Patient not taking: Reported on 8/15/2022), Disp: , Rfl:     Insulin Degludec (TRESIBA) 100 UNIT/ML SOLN, Inject 14 Units into the skin daily , Disp: , Rfl:     OZEMPIC, 0.25 OR 0.5 MG/DOSE, 2 MG/1.5ML SOPN, INJECT 0.25MG ONCE WEEKLY AS DIRECTED 30, Disp: , Rfl:     escitalopram (LEXAPRO) 10 MG tablet, TAKE 1 TABLET BY MOUTH TWICE A DAY, Disp: , Rfl:     naproxen (NAPROSYN) 500 MG tablet, TAKE 1 TABLET BY MOUTH EVERY 12 HOURS WITH FOOD OR MILK AS NEEDED FOR 30 DAYS (Patient not taking: Reported on 8/15/2022), Disp: , Rfl:     clotrimazole-betamethasone (LOTRISONE) 1-0.05 % cream, Apply to affected area bid externally x 4 days then daily for 3 days (Patient not taking: Reported on 2022), Disp: 45 g, Rfl: 1    ALLERGIES:  Allergies   Allergen Reactions    Ketorolac Other (See Comments)     Fainted/ vomited    Norco [Hydrocodone-Acetaminophen]      Facial flushing/redness         REVIEW OF SYSTEMS:    A full 14 point review of systems was performed and assessed and found to be negative except as noted above. PHYSICAL EXAMINATION:    CHAPERONE: Family/friend/companieon Present    ECO Asymptomatic    VITAL SIGNS: BP (!) 155/95   Pulse 86   Temp 97.7 °F (36.5 °C) (Temporal)   Resp 16   Wt 213 lb 3.2 oz (96.7 kg)   LMP 2011   BMI 36.60 kg/m²   GENERAL:  General appearance is that of a well-nourished, well-developed in no apparent distress. HEENT: Normocephalic, atraumatic, EOMI, moist mucosa, no erythema. NECK:  No adenopathy or a palpable thyroid mass, trachea is midline. LYMPHATICS: No cervical, supraclavicular, or axillary adenopathy.   HEART: Normal rate and regular rhythm, normal heart sounds. LUNGS:  Pulmonary effort normal. Normal breath sounds. ABDOMEN:  Soft, nontender, non distended, and no hepatosplenomegaly. EXTREMITIES:  No edema. No calf tenderness. MSK:  No spinal tenderness. Normal ROM. NEUROLOGICAL: Alert and oriented. Strength and sensation intact bilaterally. No focal deficits. PSYCH: Mood normal, behavior normal.  SKIN: No erythema, no desquamation. BREAST: Deferred       LABS:  WBC   Date Value Ref Range Status   06/22/2022 13.9 (H) 3.5 - 11.3 k/uL Final     Segs Absolute   Date Value Ref Range Status   06/22/2022 10.17 (H) 1.50 - 8.10 k/uL Final     Hemoglobin   Date Value Ref Range Status   06/22/2022 13.4 11.9 - 15.1 g/dL Final     Platelets   Date Value Ref Range Status   06/22/2022 357 138 - 453 k/uL Final     No results found for: , CEA  No results found for: PSA    PATHOLOGY:   7/7/22 Lump SLN  -- Diagnosis --     A. Left axillary sentinel lymph node, biopsy:     -  Negative for metastatic carcinoma (0/1). B.  Left breast, excisional lumpectomy:     -  Invasive ductal carcinoma, grade 2.     -  Invasive carcinoma is 1.8 cm.     -  Previously determined to be estrogen receptor positive,   progesterone receptor positive, and HER2 not amplified by FISH. -  High-grade ductal carcinoma in situ.     -  Organizing biopsy site change. -  Margins are free of carcinoma (considering additional margin   tissue, part C). C.  Left breast, additional medial margin:     -  High-grade ductal carcinoma in situ. -  Margins are free of involvement by ductal carcinoma in situ.     PATHOLOGIC STAGE CLASSIFICATION (pTNM):     pT1c  pN0     ASSESSMENT AND PLAN:  Netta Avila is a 56 y.o. female with a Cancer Staging  Malignant neoplasm of upper-outer quadrant of left breast in female, estrogen receptor positive ([facility] [address]  Staging form: Breast, AJCC 8th Edition  - Clinical stage from 5/19/2022: Stage IA (cT1, cN0, cM0, G1, ER+, ND+, HER2-) - Signed by Paul Torres MD on 5/31/2022  . We reviewed with the patient and family the testing that has been done so far, including imaging and pathology. We also reviewed their staging based on the testing that as been done and the recommendations per the NCCN guidelines. We discussed the options of treatment, including surgery, chemotherapy, and radiation, and the rationale of why radiation therapy would be indicated for this diagnosis. We also discussed that they have the option to not pursue our recommended treatment as well. We reviewed that her pathology showed a early stage diagnosis still though given her age we would recommend adjuvant whole breast radiation with a lumpectomy cavity boost.  She will also be a candidate for adjuvant endocrine therapy. We reviewed the logistics of radiation treatment planning, including a CT Simulation session, as well as daily treatments for approximately 4 weeks. With regards to radiation to the left breast, I discussed the possible short-term side effects of fatigue, skin irritation (causing redness, dryness, or peeling), swelling and tenderness of the left breast, tiredness, low blood counts (causing infection or bleeding) and hair loss in treated area. Possible long-term side effects discussed included change in the cosmetic appearance of the left breast (change in shape, size, and texture of the breast), hyper/hypo-pigmentation of the skin, limited range of motion of the left shoulder, scarring of the lung (causing shortness of breath and cough), damage to the heart, swelling of the left arm i.e. lymphedema (causing pain), damage to the nerves (causing numbness, weakness, or paralysis) and rib fracture. We discussed the use of deep inspirative breath holding techniques during planning and treatment to help reduce the risk of toxicities to the heart.     After ample time to review the patient's questions, an informed consent was obtained to proceed with scheduling a treatment planning session for radiation therapy. We will also refer her to for oncology rehab to help with her left shoulder range of motion. Patient was in agreement with my recommendations. All questions were answered to their satisfaction. Patient was advised to contact us anytime should they have any questions or concerns. Electronically signed by Mitchell Dong MD on 8/16/2022 at 8:50 AM        Medications Prescribed:   New Prescriptions    No medications on file       Orders:   Orders Placed This Encounter   Procedures    [address] [place]       CC:  Patient Care Team:  Unique Muñoz as PCP - General (Family Medicine)  Anitra Sullivan DO as Consulting Physician (Obstetrics & Gynecology)  Harry Mccrary RN as Nurse Navigator (Oncology)     Total time spent on this case on the day of encounter is 30 minutes. This time includes combination of one or more of the following - review of necessary tests, review of pertinent medical records from the EMR, performing medically appropriate examination and evaluation, counseling and educating the patient/family/caregiver, ordering necessary medical tests, procedures etc., documenting the clinical information in the electronic medical record, care coordination, referring and communicating with other health care providers and interpretation of results independently. This note is created with the assistance of a speech recognition program.  While intending to generate a document that actually reflects the content of the visit, the document can still have some errors including those of syntax and sound a like substitutions which may escape proof reading. It such instances, actual meaning can be extrapolated by contextual diversion.

## 2022-08-17 RX ORDER — FLUCONAZOLE 150 MG/1
TABLET ORAL
Qty: 2 TABLET | Refills: 0 | Status: SHIPPED | OUTPATIENT
Start: 2022-08-17 | End: 2022-10-12

## 2022-08-25 ENCOUNTER — TELEPHONE (OUTPATIENT)
Dept: ONCOLOGY | Age: 47
End: 2022-08-25

## 2022-08-25 ENCOUNTER — HOSPITAL ENCOUNTER (OUTPATIENT)
Dept: RADIATION ONCOLOGY | Age: 47
Discharge: HOME | End: 2022-08-25
Payer: COMMERCIAL

## 2022-08-25 PROCEDURE — 77263 THER RADIOLOGY TX PLNG CPLX: CPT | Performed by: RADIOLOGY

## 2022-08-25 PROCEDURE — 77290 THER RAD SIMULAJ FIELD CPLX: CPT | Performed by: RADIOLOGY

## 2022-08-25 PROCEDURE — 77334 RADIATION TREATMENT AID(S): CPT | Performed by: RADIOLOGY

## 2022-08-25 NOTE — DISCHARGE INSTRUCTIONS
the skin dry rather than rubbing it, especially at the treatment site  Do not shave the treatment area. If you must shave, such as a beard, use an electric shaver. Don't use pre/after shave creams on treatment area. Avoid friction on and around your treatment area  Do not use tape, bandages, or medicated patches in the treatment area  Avoid tight fitting clothing  No massaging or scratching    Avoid extremes of temperature on the treatment area such as heating pads, ice packs, hot tubs, or saunas    If the area being treated is exposed to the sun, apply sunscreen routinely to the treatment site whenever you are outdoors. A sunscreen with a minimum of SPF30 should be used. Since the area being treated will always be more sensitive than the rest of your skin, continue to protect the area from sun exposure after your treatment ends    If your armpit is in the treatment area, do not use antiperspirants. An aluminum-free, non-metallic deodorant may be used. Tell your nurse or doctor if you have any of the following symptoms:  Blistered, open, swollen or tender areas of the skin in and around the treatment area  Pain or itching that is not helped by prescribed medications or recommended ointments      Questions and Answers about Radiation Therapy  1. What is radiation therapy? Radiation therapy (also called radiotherapy) is a cancer treatment that uses high  doses of radiation to kill cancer cells and shrink tumors. At low doses, radiation  is used as an x-ray to see inside your body and take pictures, such as x-rays of  your teeth or broken bones. 2.  How is radiation therapy given? Radiation therapy can be external beam or internal. External beam involves a  machine outside your body that aims radiaition of cancer cells. Internal radiation  therapy involves placing radiation inside your body, in or near the cancer. Sometimes ppeople get both forms of radiation therapy.  To learn more about  external beam radiation therapy. 3.  What does radiation therapy do to cancer cells? Given in high doses, radiation kills or slows the growth of cancer cells. Radiaton  therapy is used to:  Treat Cancer. Radiation can be used to cure cancer, to prevent it from returning, or to stop or slow its growth  Reduce symptoms. When a cure is not possible, radiation may be used to treat pain and other problems caused by the cancer tumor. Or, it can prevent problems that may be caused by a growing tumor, such as blindness or loss of bowel and bladder control. 4.  How long does radiation therapy take to work? Radiation therapy does not kill cancer cells right away. It takes days or weeks of  treatments before cancer cells start to die. Then, cancer cells keep dying for  weeks or months after radiation therapy ends. 5.  What does radiation therapy do to healthy cells? Radiation not only dills or slows the growth of cancer cells, it can also affect  nearby healthy cells. The healthy cells almost always recover after treatment is  over. But sometimes people may have side effects that are severe or do not get  better. Other side effects may how up months or years after radiation therapy is  over. These are called late side effects. Doctors try to protect healthy cells during treatment by:  Using as low a does of radiation as possible. The radiation dose is balanced between being high enough to kill cancer cells, yet low enough to limit damage to healthy cells. Spreading out treatment over time. You may get radiation therapy once a day, or in smaller doses twice a day for several weeks. Spreading out the radiation dose allows normal cells to recover while cancer cells die. Aimimng radiation at a precise part of your body. Some types of radiation therapy allow your doctor to aim high doses of radiaiton at your cancer while reducing radiation to nearby healthy tissue.  These techniques use a computer to deliver precise radiation doses to a cancer tumor or to specific areas within the tumor. [address] [facility]       Breast Side Effects  Fatigue  Hair loss  Skin changes  Tenderness  Swelling     Fatigue  How long it lasts  When you will first feel fatigue depends on a few factors, such as your age, health, how active you are, and how you felt before radiation therapy started. Fatigue can last from 6 weeks to a year after your last radiation therapy session. Some people may always feel fatigue and not have as much energy as they did before radiation therapy. Ways to Manage Fatigue    Try to sleep at least 8 hours each night. This may be more sleep than you needed before radiation therapy. One way to sleep better at night is to be active during the day. Another way is to relax right before going to bed. Do calming activities before bedtime, such as reading, working on a [employer] puzzle, or listening to music. Plan time to rest. Take short naps or rest breaks between activities. Try not to do too much. With fatigue, you ay not have enough energy to do all the things you want to do. Stay active, but choose the activities that are most important to you. Try to let go of things that don't matter as much now. For example, you might go to work but not do housework. You might watch your children's sprots events but not cook dinner. Exercise. Research shows that most people feel better when they get some exercise each day. Go for a short walk, ride a bike, or do yoga. Talk with your doctor or nurse about types of exercise you can do while having radiation therapy. Relax. Mediatation, prayer, gentle yoga, guided imagery, and visualization are ways you can learn to relax and decrease stress.     For relaxation exercises:  Visit Learning to Relax on the National Cancer [place]'s website at : www.cancer.gov/about-cancer/copingfeelings/relaxation  See Facing Forward: Life After Cancer Treatment at: www.cancer.gov/publications/patient-education/facing-forward  Eat and drink well. It can be easier to eat if you have 5 or 6 small meals each day, rather than 3 large ones. Keep foods around that are easy to fix, such as canned soups, frozen meals, yogurt, and cottage cheese. Drink plenty of fluids each day-about 8 cups of water or juice. Plan a work schedule that is right for you. Fatigue may affect the amount of energy you have for your job. You may feel well enough to work your full schedule, or you may need to work less-maybe just a few hours a day or a few days each week. You may want to talk with your boss about ways to work from home so you do not need to commute. If possible, you may want to think about going on medical leave while you have radiation therapy. Skin Changes  What they are:  Radiation therapy can cause skin changes in your treatment area. Here are some common skin changes:  Redness. Your skin in the treatment area may look as if you have a mild to severe sunburn or tan  Severe itching. The skin in your treatment area may itch very badly. It is important to avoid scratching, which can cause skin breakdown and infection. Skin breakdown is a problem that happens when the skin in the treatment area peels off faster than it can grow back. Dry and peeling skin. The skin in your treatment area can get very dry. It may get so dry that it starts to peel, as if you have had a bad sunburn. If it peels off faster than it can grow back, you may develop sores or ulcers. Moist reaction. The skin in your treatment area can become wet, sore and infected This problem is more common where you have skin folds, such as your buttocks, behind your ears and under your breasts. It may also occur where your skin is very thin, such as your neck. Swollen skin. The skin in your treatment area may be swollen and puffy. Ways to Manage Skin Changes    Skin Care.  Take extra good care of your skin during radiation therapy. Be gentle and do not rub, scrub, or scratch in the treatment area. Use creams that your doctor or nurse suggests. Acquaphor / Chris  / Miaderm   Apply recommended lotions to treatment area 2 times daily. You should start this when you begin radiation treatments. Do not put anything on your skin that is very hot or cold. Do not use heating pads, ice packs or other hot or cold items on the treatment area  Be gentle when you shower or take a bath. You can take a lukewarm shower every day. If you prefer to take a lukewarm bath, so do only every other day and don't soak  For too long. Whether you take a shower or bath, make sure to use a mild soap. Dry yourself with a soft towel by patting, not rubbing, your skin. Be careful not to wash off the ink markings that you need for radiation therapy. Use only those lotions and skin products that your doctor or nurse suggests. If you are using a prescribed cream for a skin problem or acne, tell your doctor or nurse before you begin radiation treatment. Check with your doctor or nurse before using any of the following skin products: Bubble bath     Cornstarch  Cream  Deodorant  Hair removers  Makeup  Oil   Ointment  Perfume  Powder  Soap   Sunscreen  Cool, humid places. Your skin may feel much better when you are in a cool, humid places. You can make rooms more humid by putting a bowl of water on the radiator or using a humidifier. If you use a humidifier, be sure to follow the directions about cleaning it to prevent bacteria. Soft fabrics. Wear clothes and use bed sheets that are made of very soft fabrics. Do not wear clothes in your treatment area that are tight and do not breathe, such as girdles, body shapers, and pantyhose  Protect your skin from the sun every day. The sun can burn you even on cloudy days or when you are outside for just a few minutes. Do not go to the beach or sunbathe.  Wear a broad-brimmed hat, long-sleeved shirt, and long pants when you

## 2022-08-25 NOTE — TELEPHONE ENCOUNTER
Name: Netta Avila  : 1975  MRN: [id number]    Oncology Navigation Follow-Up Note    Contact Type:  Radiation Oncology    Notes:Writer spoke with pt for ONN f/u during her appt in RO. Pt will have teach/SIM for radiation planning today. Pt denied further barriers and any unanswered questions at this time. Pt has writer's contact information and encouraged to contact writer with any concerns. Will continue to follow.      Electronically signed by Aj Bill RN on 2022 at 9:52 AM

## 2022-08-26 ENCOUNTER — TELEPHONE (OUTPATIENT)
Dept: ONCOLOGY | Age: 47
End: 2022-08-26

## 2022-08-26 NOTE — TELEPHONE ENCOUNTER
[facility] Nutrition Note    PG-SGA screening tool reviewed with score of 12. Pt reports no appetite/no interest in eating, nausea, constipation, diarrhea, smells bother me, consuming less than normal amounts of normal food, and feeling not my normal self, but able to be up and about with fairly normal activities. Full nutrition assessment for scores > 4.      Joselo Franklin, MS, RD, LD  Registered Dietitian  [facility]  [phone]

## 2022-08-29 ENCOUNTER — HOSPITAL ENCOUNTER (OUTPATIENT)
Dept: RADIATION ONCOLOGY | Age: 47
Discharge: HOME | End: 2022-08-29
Payer: COMMERCIAL

## 2022-08-29 PROCEDURE — 77334 RADIATION TREATMENT AID(S): CPT | Performed by: RADIOLOGY

## 2022-08-29 PROCEDURE — 77300 RADIATION THERAPY DOSE PLAN: CPT | Performed by: RADIOLOGY

## 2022-08-29 PROCEDURE — 77295 3-D RADIOTHERAPY PLAN: CPT | Performed by: RADIOLOGY

## 2022-08-29 PROCEDURE — 77336 RADIATION PHYSICS CONSULT: CPT | Performed by: RADIOLOGY

## 2022-08-31 ENCOUNTER — TELEPHONE (OUTPATIENT)
Dept: ONCOLOGY | Age: 47
End: 2022-08-31

## 2022-08-31 NOTE — TELEPHONE ENCOUNTER
LVM for pt to call so we can move her appointment time in October. Per Jonathan Hamilton RN pt needs an earlier time.     Electronically signed by Bruce Garcia on 8/31/2022 at 11:35 AM

## 2022-09-01 ENCOUNTER — TELEPHONE (OUTPATIENT)
Dept: ONCOLOGY | Age: 47
End: 2022-09-01

## 2022-09-01 ENCOUNTER — HOSPITAL ENCOUNTER (OUTPATIENT)
Dept: PHYSICAL THERAPY | Facility: CLINIC | Age: 47
Setting detail: THERAPIES SERIES
Discharge: HOME | End: 2022-09-01
Payer: COMMERCIAL

## 2022-09-01 ENCOUNTER — TELEPHONE (OUTPATIENT)
Dept: OCCUPATIONAL THERAPY | Age: 47
End: 2022-09-01

## 2022-09-01 PROCEDURE — 97140 MANUAL THERAPY 1/> REGIONS: CPT

## 2022-09-01 PROCEDURE — 97161 PT EVAL LOW COMPLEX 20 MIN: CPT

## 2022-09-01 PROCEDURE — 97110 THERAPEUTIC EXERCISES: CPT

## 2022-09-01 NOTE — CONSULTS
breast Pain Rating: (0-10 scale) 1/10  Pain altered Tx:  [] Yes  [x] No  Action:  Symptoms:  [x] Improving [] Worsening [] Same    Sleep: [x] OK    [] Disturbed    Patient lives with:  , 2 children ( 1 is autistic)   Employer    Job Status []  Normal duty   [] Light duty   [] Off due to condition    []  Retired   [x] Not employed   [] Disability  [] Other:  []  Return to work: Work activities/duties Homemaker, [employer]         TEST INITIAL DATE DISCHARGE DATE: VISIT#   6 MIN WALK Baseline/end of test Baseline/end of test   HR (bpm)     SpO2     dyspnea     fatigue     Distance (ft)     Blood Pressure     Brief Fatigue Inventory  (0: none, 1-3: mild, 4-6: moderate,   7-10: severe) . 6    Functional Test     UEFS 72/80, 10%      Objective:     ROM  °A/P END FEEL STRENGTH    Left Right  Left Right   Shld Flex 129 138  4+ 5    Shld Abd 136 158  4+ 5    Shld IR T12 T12  5 5   ER @ 0 45  90 72 85  5 5   Elbow flex    5 5   ext    5 5                           Cerv decr 25% \"tight\"    OBSERVATION No Deficit Deficit Not Tested Comments   Forward Head [] [x] []    Rounded Shoulders [] [x] []    Kyphosis [] [] []    Scap Height/Position [] [] []    Winging [] [] []    SH Rhythm [] [] []    INSPECTION/PALPATION    Incision closed and healing well, Mod tightness and TTP pec and throughout axilla, fullness noted in chest, axilla and upper arm. Muscular tension throughout L upper quarter       FUNCTION Normal Difficult Unable   Overhead reach [] [x] tight []   Underarm reach  [] [x] []   Groom/Dress [x] [] []   Bra/Shirt tuck [x] [] []   Lift/Carry [] [x] []    [] [] []     See lymphedema note for measurements  Stemmers Sign (middle finger or base of second toe)(edema concerns)  [x] Negative - able to pull up on skin  []Positive - unable to pull up on skin      Assessment:  Problems:    [x] ? Pain:L breast  [x] ? ROM:L shoulder  [x] ? Strength: L shoulder, posture/core stability  [x] ?  Function: difficulty w/ household chores, lifting, reaching OH  [] Other:    STG: (to be met in 12 treatments)  ? Pain: Stabilize L shoulder and chest pain and ensure optimal management of pain during all ADLs ( home, occupation, community and recreation)  2.   ? ROM: Able to sustain AROM of bilat shoulders to be able to position arm for radiation  3.   ? Strength: L shoulder grossly 5/5 and demo good postural and core stability  4.   ? Function: Pt to report incr ease w/ adv ADL's, lifting and reaching OH  5. Independent with Home Exercise Programs as demonstrated by performance with correct form without cues. LTG: (to be met in 18 treatments)  1. Optimize L shoulder functional ROM to improve QOL  2. Improve tissue mobility of chest wall and axilla to allow for more normal motion and function  3. Continue activity to decrease risk factors associated with increased time being sedentary while undergoing chemotherapy, radiation, surgery. 4. Control/mitigate side effects/late effects of Cancer treatment  5. Maintain BFI score for activity tolerance, and UEFS for overall improved function                 Patient goals: to prevent any complications from radiation    Rehab Potential:  [x] Good  [] Fair  [] Poor   Suggested Professional Referral:  [x] No  [] Yes:  Barriers to Goal Achievement:  [x] No  [] Yes:  Domestic Concerns:  [x] No  [] Yes:    Pt. Education:  [x] Plans/Goals, Risks/Benefits discussed  [x] Home exercise program  Method of Education: [x] Verbal  [x] Demo  [x] Written  Comprehension of Education:  [x] Verbalizes understanding. [] Demonstrates understanding. [] Needs Review. [] Demonstrates/verbalizes understanding of HEP/Ed previously given.       Treatment Plan:  [x] Therapeutic Exercise   02134  [] Iontophoresis: 4 mg/mL Dexamethasone Sodium Phosphate  mAmin  53828   [x] Therapeutic Activity  64614 [] Vasopneumatic cold with compression  84834    [] Gait Training   35329 [] Ultrasound   31363   [] Neuromuscular Re-education  [phone] [] Electrical Stimulation Unattended  88112   [x] Manual Therapy  84241 [] Electrical Stimulation Attended  [id number]   [x] Instruction in HEP  [] Dry Needling   [] Aquatic Therapy   74736 [] Cold/hotpack    [] Massage   [id number]      [] Lumbar/Cervical Traction  [id number]         Frequency: 1 x/week for 18 visits    Todays Treatment:  Modalities:   Precautions:  Manual: partial PORI  Exercises:  Exercise Reps/ Time Weight/ Level Comments             Diaphragmatic breathing 5 cycles     Elbow winging 1m     Supine cane flexion 10x           Bye bye's 3x ea  Flex, abd   Scapular retraction      Post rolls            Wall flexion slides      Doorway pec stretch (advanced)            Other: Instructed pt in therex per log, proper posture and breathing ex. Discussed lymphedema S&S, precautions and use of sleeve during and after radiation.  Encouraged pt to start wearing sleeve some now to allow system to adjust.     Specific Instructions for next treatment:Initiate PORi breast protocol, progress as nate      Evaluation Complexity:  History (Personal factors, comorbidities) [] 0 [x] 1-2 [] 3+   Exam (limitations, restrictions) [x] 1-2 [] 3 [] 4+   Clinical presentation (progression) [x] Stable [] Evolving  [] Unstable   Decision Making [x] Low [] Moderate [] High    [x] Low Complexity [] Moderate Complexity [] High Complexity       Treatment Charges: Mins Units   [x] Evaluation       []  Low       []  Moderate       []  High 20 1   []  Modalities     [x]  Ther Exercise 15 1   [x]  Manual Therapy 10 1   []  Ther Activities     []  Aquatics     []  Vasocompression     []  Other       TOTAL TREATMENT TIME: 45    Time in: 9:15 am    Time Out: 10:20   am    Electronically signed by: Zach Stoll PT, MOIRA        Physician Signature:________________________________Date:__________________  By signing above or cosigning this note, I have reviewed this plan of care and certify a need for medically necessary rehabilitation services.      *PLEASE SIGN ABOVE AND FAX BACK ALL PAGES*

## 2022-09-01 NOTE — TELEPHONE ENCOUNTER
Called pt to discuss recent SOZO testing that indicated developing lymphedema. Recommending that pt wear sleeve daily (off at night) x30 days to address & follow up with oncology for SOZO reassessment in 30 days as well. Pt verbalizes understanding.

## 2022-09-02 ENCOUNTER — TELEPHONE (OUTPATIENT)
Dept: ONCOLOGY | Age: 47
End: 2022-09-02

## 2022-09-07 ENCOUNTER — HOSPITAL ENCOUNTER (OUTPATIENT)
Dept: RADIATION ONCOLOGY | Age: 47
Discharge: HOME | End: 2022-09-07
Payer: COMMERCIAL

## 2022-09-07 PROCEDURE — 77412 RADIATION TX DELIVERY LVL 3: CPT | Performed by: RADIOLOGY

## 2022-09-07 PROCEDURE — 77280 THER RAD SIMULAJ FIELD SMPL: CPT | Performed by: RADIOLOGY

## 2022-09-07 PROCEDURE — 77336 RADIATION PHYSICS CONSULT: CPT | Performed by: RADIOLOGY

## 2022-09-08 ENCOUNTER — HOSPITAL ENCOUNTER (OUTPATIENT)
Dept: RADIATION ONCOLOGY | Age: 47
Discharge: HOME | End: 2022-09-08
Payer: COMMERCIAL

## 2022-09-08 PROCEDURE — G6002 STEREOSCOPIC X-RAY GUIDANCE: HCPCS | Performed by: RADIOLOGY

## 2022-09-08 PROCEDURE — 77387 GUIDANCE FOR RADJ TX DLVR: CPT | Performed by: RADIOLOGY

## 2022-09-08 PROCEDURE — 77412 RADIATION TX DELIVERY LVL 3: CPT | Performed by: RADIOLOGY

## 2022-09-09 ENCOUNTER — HOSPITAL ENCOUNTER (OUTPATIENT)
Dept: RADIATION ONCOLOGY | Age: 47
Discharge: HOME | End: 2022-09-09
Payer: COMMERCIAL

## 2022-09-09 PROCEDURE — 77387 GUIDANCE FOR RADJ TX DLVR: CPT | Performed by: RADIOLOGY

## 2022-09-09 PROCEDURE — 77412 RADIATION TX DELIVERY LVL 3: CPT | Performed by: RADIOLOGY

## 2022-09-09 PROCEDURE — G6002 STEREOSCOPIC X-RAY GUIDANCE: HCPCS | Performed by: RADIOLOGY

## 2022-09-12 ENCOUNTER — HOSPITAL ENCOUNTER (OUTPATIENT)
Dept: RADIATION ONCOLOGY | Age: 47
Discharge: HOME | End: 2022-09-12
Payer: COMMERCIAL

## 2022-09-12 ENCOUNTER — HOSPITAL ENCOUNTER (OUTPATIENT)
Dept: PHYSICAL THERAPY | Facility: CLINIC | Age: 47
Setting detail: THERAPIES SERIES
Discharge: HOME | End: 2022-09-12
Payer: COMMERCIAL

## 2022-09-12 PROCEDURE — G6002 STEREOSCOPIC X-RAY GUIDANCE: HCPCS | Performed by: RADIOLOGY

## 2022-09-12 PROCEDURE — 77412 RADIATION TX DELIVERY LVL 3: CPT | Performed by: RADIOLOGY

## 2022-09-12 PROCEDURE — 77387 GUIDANCE FOR RADJ TX DLVR: CPT | Performed by: RADIOLOGY

## 2022-09-12 PROCEDURE — 97140 MANUAL THERAPY 1/> REGIONS: CPT

## 2022-09-12 PROCEDURE — 97110 THERAPEUTIC EXERCISES: CPT

## 2022-09-12 NOTE — FLOWSHEET NOTE
[] [facility] [facility]  [address]  [phone]  F: [phone] [] [address]  [facility] [address]   [address]  P: [phone]  F: [phone] [x] [facility]  Outpatient Rehabilitation &  Therapy  [address]  P: [phone]  F: [phone] [] [address]  P: [phone]  F: [phone] [] [address]  [facility]   [address]   [place]: [phone]  F: [phone]      Physical Therapy Daily Treatment Note    Date:  2022  Patient Name:  Marcy Link    :  1975  MRN: [id number]  Physician: Samuel Reyes MD                                 Insurance: Narrative 60 visits  Medical Diagnosis: Malignant neoplasm of upper-outer quadrant of left breast in female, ER+     Rehab Codes: C50.412, Z17.0  Onset date: 22                 Next Dr's appt. : 22  Visit# / total visits:   Cancels/No Shows: 0    Subjective:  Pt started radiation last Wed, some incr pain and soreness/tender L breast. More tired and notes some incr nausea yesterday, no appetite. Pain:  [x] Yes  [] No Location: L chest Pain Rating: (0-10 scale) 2/10  Pain altered Tx:  [x] No  [] Yes  Action:  Comments:    Objective:  Modalities:   Precautions: L BrCa, S/p lumpectomy and sentinel LN biopsy 22. No chemo, endocrine post radiation. Radiation 5x4 weeks 22-  Manual:Utilized PORi breast protocol to LEFT upper quarter for gentle manual lymphatic drainage, myofascial release and joint mobility to facilitate better function, ROM, tissue mobility and dynamics of lymph system.    Exercises:  Exercise Reps/ Time Weight/ Level Comments             Diaphragmatic breathing 5 cycles       Elbow winging 1m       Supine cane flexion 10x                 Bye bye's 3x ea   Flex, abd   Scapular retraction    *     Post rolls  10x  *               Wall flexion slides    *     SL ABD, ER, HAB  *    Doorway pec stretch (advanced)                  Other:  L shoulder AROM: flex 151° \"tight /pulling\" axilla before Rx, after 156°      Treatment Charges: Mins Units   []  Modalities     [x]  Ther Exercise 10 1   [x]  Manual Therapy 45 3   []  Ther Activities     []  Aquatics     []  Vasocompression     []  Other     Total Treatment time 55        Assessment: [x] Progressing toward goals. Initiated PORI breast protocol to L upper quarter, sig tightness and TTP pec, axilla and subscapular muscles. Emphasized importance of use of sleeve through radiation and 30 days after. Encouraged daily hydration, nutrition and moisturizing through radiation as well as daily HEP. Will cont 1x weekly through radiation. [] No change. [] Other:  [x] Patient would continue to benefit from skilled physical therapy services in order to: address the following goals    STG: (to be met in 12 treatments)  ? Pain: Stabilize L shoulder and chest pain and ensure optimal management of pain during all ADLs ( home, occupation, community and recreation)  2.   ? ROM: Able to sustain AROM of bilat shoulders to be able to position arm for radiation  3.   ? Strength: L shoulder grossly 5/5 and demo good postural and core stability  4.   ? Function: Pt to report incr ease w/ adv ADL's, lifting and reaching OH  5. Independent with Home Exercise Programs as demonstrated by performance with correct form without cues. LTG: (to be met in 18 treatments)  1. Optimize L shoulder functional ROM to improve QOL  2. Improve tissue mobility of chest wall and axilla to allow for more normal motion and function  3. Continue activity to decrease risk factors associated with increased time being sedentary while undergoing chemotherapy, radiation, surgery. 4. Control/mitigate side effects/late effects of Cancer treatment  5.  Maintain BFI score for activity tolerance, and UEFS for overall improved function                 Patient goals: to prevent any complications from radiation    Pt. Education:  [x] Yes  [] No  [x] Reviewed Prior HEP/Ed  Method of Education: [x] Verbal  [x] Demo  [] Written  Comprehension of Education:  [x] Verbalizes understanding. [] Demonstrates understanding. [] Needs review. [] Demonstrates/verbalizes HEP/Ed previously given. Plan: [x] Continue current frequency toward long and short term goals.     [x] Specific Instructions for subsequent treatments: cont w/ manual and progress as nate      Time In:10:00 am            Time Out: 10:55 am    Electronically signed by:  Susan Rodriguez PT , MOIRA

## 2022-09-13 ENCOUNTER — HOSPITAL ENCOUNTER (OUTPATIENT)
Dept: RADIATION ONCOLOGY | Age: 47
Discharge: HOME | End: 2022-09-13
Payer: COMMERCIAL

## 2022-09-13 PROCEDURE — 77412 RADIATION TX DELIVERY LVL 3: CPT | Performed by: RADIOLOGY

## 2022-09-13 PROCEDURE — G6002 STEREOSCOPIC X-RAY GUIDANCE: HCPCS | Performed by: RADIOLOGY

## 2022-09-13 PROCEDURE — 77387 GUIDANCE FOR RADJ TX DLVR: CPT | Performed by: RADIOLOGY

## 2022-09-14 ENCOUNTER — TELEPHONE (OUTPATIENT)
Dept: ONCOLOGY | Age: 47
End: 2022-09-14

## 2022-09-14 ENCOUNTER — HOSPITAL ENCOUNTER (OUTPATIENT)
Dept: RADIATION ONCOLOGY | Age: 47
Discharge: HOME | End: 2022-09-14
Payer: COMMERCIAL

## 2022-09-14 VITALS
TEMPERATURE: 97.4 F | WEIGHT: 212.4 LBS | SYSTOLIC BLOOD PRESSURE: 169 MMHG | RESPIRATION RATE: 16 BRPM | DIASTOLIC BLOOD PRESSURE: 84 MMHG | BODY MASS INDEX: 36.46 KG/M2 | HEART RATE: 87 BPM

## 2022-09-14 PROCEDURE — 77427 RADIATION TX MANAGEMENT X5: CPT | Performed by: RADIOLOGY

## 2022-09-14 PROCEDURE — 77336 RADIATION PHYSICS CONSULT: CPT | Performed by: RADIOLOGY

## 2022-09-14 PROCEDURE — 77412 RADIATION TX DELIVERY LVL 3: CPT | Performed by: RADIOLOGY

## 2022-09-14 PROCEDURE — 77417 THER RADIOLOGY PORT IMAGE(S): CPT | Performed by: RADIOLOGY

## 2022-09-14 ASSESSMENT — PAIN DESCRIPTION - LOCATION: LOCATION: BREAST

## 2022-09-14 ASSESSMENT — PAIN SCALES - GENERAL: PAINLEVEL_OUTOF10: 2

## 2022-09-14 ASSESSMENT — PAIN DESCRIPTION - ORIENTATION: ORIENTATION: LEFT

## 2022-09-14 NOTE — PROGRESS NOTES
April L Toeppe  9/14/2022  Wt Readings from Last 3 Encounters:   09/14/22 212 lb 6.4 oz (96.3 kg)   08/15/22 213 lb 3.2 oz (96.7 kg)   07/22/22 213 lb 3.2 oz (96.7 kg)     Body mass index is 36.46 kg/m². Treatment Area:  Left Breast    Patient was seen today for weekly visit. Comfort Alteration    Fatigue: Mild    Nutritional Alteration  Anorexia: No   Nausea: Yes   Vomiting: No     Mucous Membrane Alteration  Drainage: No  Lymphedema: No    Skin Alteration   Sensation:wdl    Radiation Dermatitis:  Intact [x]     Erythema  []     Discoloration  []     Rash []     Dry desquamation  []     Moist desquamation []       Emotional  Coping: effective      Injury, potential bleeding or infection:     Lab Results   Component Value Date    WBC 13.9 (H) 06/22/2022     06/22/2022         BP (!) 169/84   Pulse 87   Temp 97.4 °F (36.3 °C) (Temporal)   Resp 16   Wt 212 lb 6.4 oz (96.3 kg)   LMP 11/24/2011   BMI 36.46 kg/m²      Pain Assessment: 0-10  Pain Level: 2           Assessment/Plan: Patient was seen today for weekly visit. Patient reports nausea and increased swelling and pain to her left breast area. She states she is alternating Tylenol and Ibuprofen OTC for the pain without much improvement. She is using her steroid cream as instructed.       John Braga RN

## 2022-09-15 ENCOUNTER — HOSPITAL ENCOUNTER (OUTPATIENT)
Dept: RADIATION ONCOLOGY | Age: 47
Discharge: HOME | End: 2022-09-15
Payer: COMMERCIAL

## 2022-09-15 DIAGNOSIS — Z17.0 MALIGNANT NEOPLASM OF UPPER-OUTER QUADRANT OF LEFT BREAST IN FEMALE, ESTROGEN RECEPTOR POSITIVE (HCC): Primary | ICD-10-CM

## 2022-09-15 DIAGNOSIS — C50.412 MALIGNANT NEOPLASM OF UPPER-OUTER QUADRANT OF LEFT BREAST IN FEMALE, ESTROGEN RECEPTOR POSITIVE (HCC): Primary | ICD-10-CM

## 2022-09-15 PROCEDURE — 77387 GUIDANCE FOR RADJ TX DLVR: CPT | Performed by: RADIOLOGY

## 2022-09-15 PROCEDURE — G6002 STEREOSCOPIC X-RAY GUIDANCE: HCPCS | Performed by: RADIOLOGY

## 2022-09-15 PROCEDURE — 77412 RADIATION TX DELIVERY LVL 3: CPT | Performed by: RADIOLOGY

## 2022-09-15 RX ORDER — ACETAMINOPHEN AND CODEINE PHOSPHATE 300; 30 MG/1; MG/1
1 TABLET ORAL 2 TIMES DAILY PRN
Qty: 28 TABLET | Refills: 0 | Status: SHIPPED | OUTPATIENT
Start: 2022-09-15 | End: 2022-09-29

## 2022-09-15 NOTE — TELEPHONE ENCOUNTER
Patient stopped by after treatment today requesting pain medication stronger than Ibuprofen OTC. She is taking this 3-4 times per day and having the most difficulty sleeping due to pain in her left breast area. She has taken Tylenol #3 in the past which has worked well for her. Request sent to Dr. Alec Coronado.

## 2022-09-16 ENCOUNTER — HOSPITAL ENCOUNTER (OUTPATIENT)
Dept: RADIATION ONCOLOGY | Age: 47
Discharge: HOME | End: 2022-09-16
Payer: COMMERCIAL

## 2022-09-16 PROCEDURE — 77387 GUIDANCE FOR RADJ TX DLVR: CPT | Performed by: RADIOLOGY

## 2022-09-16 PROCEDURE — 77412 RADIATION TX DELIVERY LVL 3: CPT | Performed by: RADIOLOGY

## 2022-09-16 PROCEDURE — G6002 STEREOSCOPIC X-RAY GUIDANCE: HCPCS | Performed by: RADIOLOGY

## 2022-09-19 ENCOUNTER — HOSPITAL ENCOUNTER (OUTPATIENT)
Dept: RADIATION ONCOLOGY | Age: 47
Discharge: HOME | End: 2022-09-19
Payer: COMMERCIAL

## 2022-09-19 PROCEDURE — G6002 STEREOSCOPIC X-RAY GUIDANCE: HCPCS | Performed by: RADIOLOGY

## 2022-09-19 PROCEDURE — 77412 RADIATION TX DELIVERY LVL 3: CPT | Performed by: RADIOLOGY

## 2022-09-19 PROCEDURE — 77387 GUIDANCE FOR RADJ TX DLVR: CPT | Performed by: RADIOLOGY

## 2022-09-20 ENCOUNTER — HOSPITAL ENCOUNTER (OUTPATIENT)
Dept: RADIATION ONCOLOGY | Age: 47
Discharge: HOME | End: 2022-09-20
Payer: COMMERCIAL

## 2022-09-20 ENCOUNTER — HOSPITAL ENCOUNTER (OUTPATIENT)
Dept: PHYSICAL THERAPY | Facility: CLINIC | Age: 47
Setting detail: THERAPIES SERIES
Discharge: HOME | End: 2022-09-20
Payer: COMMERCIAL

## 2022-09-20 PROCEDURE — G6002 STEREOSCOPIC X-RAY GUIDANCE: HCPCS | Performed by: RADIOLOGY

## 2022-09-20 PROCEDURE — 97140 MANUAL THERAPY 1/> REGIONS: CPT

## 2022-09-20 PROCEDURE — 97110 THERAPEUTIC EXERCISES: CPT

## 2022-09-20 PROCEDURE — 77387 GUIDANCE FOR RADJ TX DLVR: CPT | Performed by: RADIOLOGY

## 2022-09-20 PROCEDURE — 77412 RADIATION TX DELIVERY LVL 3: CPT | Performed by: RADIOLOGY

## 2022-09-20 NOTE — FLOWSHEET NOTE
[] [facility]  [address]  [phone]  F: [phone] [] [address]  [facility] 36   [address]  P: [phone]  F: [phone] [x] [place] &  Therapy  [address] [facility]  P: [phone]  F: [phone] [] [address]  P: [phone]  F: [phone] [] [address]  [facility]   [address]   [place]: [phone]  F: [phone]      Physical Therapy Daily Treatment Note    Date:  2022  Patient Name:  Nitin Reddy    :  1975  MRN: [id number]  Physician: Jenelle Braga MD                                 Insurance: [employer] 60 visits  Medical Diagnosis: Malignant neoplasm of upper-outer quadrant of left breast in female, ER+     Rehab Codes: C50.412, Z17.0  Onset date: 22                 Next Dr's appt. : 22  Visit# / total visits: 3/ 18  Cancels/No Shows: 0    Subjective:  Pt states radiation is going well, notes having some lymphedema in breast, feels heavy, tender, full and a little red. Notes nipple is very irritated. Talked to lymphedema therapist and got a compression/sports bra from the store and feels a little better. Wearing sleeve daily notes no fullness/heaviness in arm. Pain:  [x] Yes  [] No Location: L chest Pain Rating: (0-10 scale) 2/10  Pain altered Tx:  [x] No  [] Yes  Action:  Comments:    Objective:  Modalities:   Precautions: L BrCa, S/p lumpectomy and sentinel LN biopsy 22. No chemo, endocrine post radiation. Radiation 5x4 weeks 22-  Manual:Utilized PORi breast protocol to LEFT upper quarter for gentle manual lymphatic drainage, myofascial release and joint mobility to facilitate better function, ROM, tissue mobility and dynamics of lymph system. Exercises:  Exercise Reps/ Time Weight/ Level Comments             Diaphragmatic breathing 5 cycles       Elbow winging 1m       Supine cane flexion 10x                 Bye bye's 3x ea   Flex, abd   Scapular retraction  10x      Post rolls  10x               Wall flexion slides  10x      SL ABD, ER, HAB  *    Doorway pec stretch (advanced)    *              Other:  L shoulder AROM: flex 155° \"tight /pulling\" axilla before Rx, after 160      Treatment Charges: Mins Units   []  Modalities     [x]  Ther Exercise 10 1   [x]  Manual Therapy 45 3   []  Ther Activities     []  Aquatics     []  Vasocompression     []  Other     Total Treatment time 55        Assessment: [x] Progressing toward goals. Min redness around nipple and cont's w/ bruising around nipple, mod fullness noted lateral breast and into axilla, very TTP. Completed PORI breast protocol to L upper quarter, sig tightness and TTP pec, axilla and subscapular muscles. Emphasized importance of use of sleeve through radiation and 30 days after as well as compression bra daily. Rev HEP and added post shld rolls, scap retraction and wall slides and instructed in self breast MLD and issued HO's for home. Encouraged daily hydration, nutrition and moisturizing through radiation as well as HEP. Will cont 1x weekly through radiation. [] No change. [] Other:  [x] Patient would continue to benefit from skilled physical therapy services in order to: address the following goals    STG: (to be met in 12 treatments)  ? Pain: Stabilize L shoulder and chest pain and ensure optimal management of pain during all ADLs ( home, occupation, community and recreation)  2.   ? ROM: Able to sustain AROM of bilat shoulders to be able to position arm for radiation  3.   ? Strength: L shoulder grossly 5/5 and demo good postural and core stability  4.   ? Function: Pt to report incr ease w/ adv ADL's, lifting and reaching OH  5.    Independent with Home Exercise Programs as demonstrated by performance with correct form without cues. LTG: (to be met in 18 treatments)  1. Optimize L shoulder functional ROM to improve QOL  2. Improve tissue mobility of chest wall and axilla to allow for more normal motion and function  3. Continue activity to decrease risk factors associated with increased time being sedentary while undergoing chemotherapy, radiation, surgery. 4. Control/mitigate side effects/late effects of Cancer treatment  5. Maintain BFI score for activity tolerance, and UEFS for overall improved function                 Patient goals: to prevent any complications from radiation    Pt. Education:  [x] Yes  [] No  [x] Reviewed Prior HEP/Ed  Method of Education: [x] Verbal  [x] Demo  [x] Written  Comprehension of Education:  [x] Verbalizes understanding. [] Demonstrates understanding. [] Needs review. [] Demonstrates/verbalizes HEP/Ed previously given. Plan: [x] Continue current frequency toward long and short term goals.     [x] Specific Instructions for subsequent treatments: cont w/ manual and progress as nate      Time In:10:00 am            Time Out: 11:00 am    Electronically signed by:  Meredith Ng, PT , MOIRA

## 2022-09-21 ENCOUNTER — HOSPITAL ENCOUNTER (OUTPATIENT)
Dept: RADIATION ONCOLOGY | Age: 47
Discharge: HOME | End: 2022-09-21
Payer: COMMERCIAL

## 2022-09-21 VITALS
TEMPERATURE: 97.8 F | BODY MASS INDEX: 36.63 KG/M2 | SYSTOLIC BLOOD PRESSURE: 156 MMHG | WEIGHT: 213.4 LBS | RESPIRATION RATE: 14 BRPM | DIASTOLIC BLOOD PRESSURE: 91 MMHG | HEART RATE: 96 BPM

## 2022-09-21 PROCEDURE — 77427 RADIATION TX MANAGEMENT X5: CPT | Performed by: RADIOLOGY

## 2022-09-21 PROCEDURE — 77417 THER RADIOLOGY PORT IMAGE(S): CPT | Performed by: RADIOLOGY

## 2022-09-21 PROCEDURE — 77412 RADIATION TX DELIVERY LVL 3: CPT | Performed by: RADIOLOGY

## 2022-09-21 ASSESSMENT — PAIN DESCRIPTION - ORIENTATION: ORIENTATION: LEFT

## 2022-09-21 ASSESSMENT — PAIN SCALES - GENERAL: PAINLEVEL_OUTOF10: 1

## 2022-09-21 ASSESSMENT — PAIN DESCRIPTION - LOCATION: LOCATION: BREAST

## 2022-09-21 NOTE — PROGRESS NOTES
April L Toeppe  9/21/2022  Wt Readings from Last 3 Encounters:   09/21/22 213 lb 6.4 oz (96.8 kg)   09/14/22 212 lb 6.4 oz (96.3 kg)   08/15/22 213 lb 3.2 oz (96.7 kg)     Body mass index is 36.63 kg/m². Treatment Area:  Left Breast    Patient was seen today for weekly visit. Comfort Alteration    Fatigue: Mild    Nutritional Alteration  Anorexia: No   Nausea: Yes   Vomiting: No     Mucous Membrane Alteration  Drainage: No  Lymphedema: No    Skin Alteration   Sensation:wdl    Radiation Dermatitis:  Intact [x]     Erythema  [x]     Discoloration  []     Rash []     Dry desquamation  []     Moist desquamation []       Emotional  Coping: effective      Injury, potential bleeding or infection:     Lab Results   Component Value Date    WBC 13.9 (H) 06/22/2022     06/22/2022         BP (!) 156/91   Pulse 96   Temp 97.8 °F (36.6 °C) (Temporal)   Resp 14   Wt 213 lb 6.4 oz (96.8 kg)   LMP 11/24/2011   BMI 36.63 kg/m²         Pain Level: 1           Assessment/Plan: Patient was seen today for weekly visit. She reports shooting pains and nipple sensitivity. Tylenol #3 is helpful for pain at bedtime. Skin has slight erythema. She is using her steroid cream as instructed.       Rissa Villeda RN

## 2022-09-21 NOTE — PROGRESS NOTES
[address]       Radiation Oncology          [address] [facility], [address]        Keven Finders: [phone]        F: [phone]       [facility] [address] [facility]       Radiation Oncology   [address] [facility], [address] [facility]       Keven Finders: [phone]       F: [phone]       [email or web address]          RADIATION ONCOLOGY WEEKLY PROGRESS NOTE  Patient ID:   April YAMEL Avila  : 1975   MRN: [id number]    Location:  [facility] Radiation Oncology,   [address] [facility], [facility]   [phone]     DIAGNOSIS:  Cancer Staging  Malignant neoplasm of upper-outer quadrant of left breast in female, estrogen receptor positive ([facility] [address]  Staging form: Breast, AJCC 8th Edition  - Clinical stage from 2022: Stage IA (cT1, cN0, cM0, G1, ER+, HI+, HER2-) - Signed by Yuli Hartmann MD on 2022      TREATMENT DETAILS:  Treatment Site: L Breast  Actual Dose: 2926cGy  Total Planned Dose: 4256cGy + 1000cGy  Treatment Technique: 3D-CRT  Fraction Technique: Daily  Therapy imaging monitoring: KV match daily with MV ports  Concurrent Chemotherapy: NA    SUBJECTIVE:   Patient seen for their weekly on treatment evaluation today. Patient is doing well though she does note some redness of the skin and more sensitivity at the nipple. She has some sharp shooting pains. She does have some fatigue as well.     OBJECTIVE:   CHAPERONE: Family/friend/companieon Present    ECO Asymptomatic    VITAL SIGNS: BP (!) 156/91   Pulse 96   Temp 97.8 °F (36.6 °C) (Temporal)   Resp 14   Wt 213 lb 6.4 oz (96.8 kg)   LMP 2011   BMI 36.63 kg/m²   Wt Readings from Last 5 Encounters:   22 213 lb 6.4 oz (96.8 kg)   22 212 lb 6.4 oz (96.3 kg)   08/15/22 213 lb 3.2 oz (96.7 kg)   22 213 lb 3.2 oz (96.7 kg)   22 213 lb (96.6 kg)     GENERAL:  General appearance is that of a well-nourished, well-developed in no apparent distress. HEENT: Normocephalic, atraumatic, EOMI, moist mucosa, no erythema. LUNGS:  Pulmonary effort normal. Normal breath sounds. EXTREMITIES:  No edema. Normal ROM. NEUROLOGICAL: Alert and oriented. Strength and sensation intact bilaterally. PSYCH: Mood normal, behavior normal.  SKIN: Mild erythema, no desquamation. LABS:  WBC   Date Value Ref Range Status   06/22/2022 13.9 (H) 3.5 - 11.3 k/uL Final     Segs Absolute   Date Value Ref Range Status   06/22/2022 10.17 (H) 1.50 - 8.10 k/uL Final     Hemoglobin   Date Value Ref Range Status   06/22/2022 13.4 11.9 - 15.1 g/dL Final     Platelets   Date Value Ref Range Status   06/22/2022 357 138 - 453 k/uL Final     Creatinine   Date Value Ref Range Status   06/22/2022 0.58 0.50 - 0.90 mg/dL Final   07/11/2017 0.54 0.50 - 0.90 mg/dL Final     No results found for: , CEA  No results found for: PSA    MEDICATIONS:    Current Outpatient Medications:     acetaminophen-codeine (TYLENOL #3) 300-30 MG per tablet, Take 1 tablet by mouth 2 times daily as needed for Pain for up to 14 days. , Disp: 28 tablet, Rfl: 0    betamethasone valerate (VALISONE) 0.1 % cream, Apply topically 2-3 times daily. , Disp: 45 g, Rfl: 2    fluconazole (DIFLUCAN) 150 MG tablet, TAKE 1 TABLET BY MOUTH ONCE FOR 1 DOSE REPEAT DOSE IN 7 DAYS. (Patient not taking: Reported on 9/14/2022), Disp: 2 tablet, Rfl: 0    docusate sodium (COLACE) 100 MG capsule, Take 1 capsule by mouth 2 times daily (Patient not taking: Reported on 8/15/2022), Disp: 20 capsule, Rfl: 0    tiZANidine (ZANAFLEX) 2 MG capsule, TAKE 1 TABLET BY MOUTH AT BEDTIME AS NEEDED (Patient not taking: Reported on 8/15/2022), Disp: , Rfl:     Insulin Degludec (TRESIBA) 100 UNIT/ML SOLN, Inject 14 Units into the skin daily , Disp: , Rfl:     OZEMPIC, 0.25 OR 0.5 MG/DOSE, 2 MG/1.5ML SOPN, INJECT 0.25MG ONCE WEEKLY AS DIRECTED 30, Disp: , Rfl:     escitalopram (LEXAPRO) 10 MG tablet, TAKE 1 TABLET BY MOUTH TWICE A DAY, Disp: , Rfl:     naproxen (NAPROSYN) 500 MG tablet, TAKE 1 TABLET BY MOUTH EVERY 12 HOURS WITH FOOD OR MILK AS NEEDED FOR 30 DAYS (Patient not taking: Reported on 8/15/2022), Disp: , Rfl:     clotrimazole-betamethasone (LOTRISONE) 1-0.05 % cream, Apply to affected area bid externally x 4 days then daily for 3 days (Patient not taking: Reported on 7/22/2022), Disp: 45 g, Rfl: 1      ASSESSMENT PLAN:   Treatment setup and plan reviewed. Port images/CBCT images reviewed. Appropriate laboratory work was reviewed. Treatment side effects and toxicities reviewed with the patient, and appropriate management was advised. Will continue radiation treatment as planned, and recommend patient contact us if they have any questions or concerns. Recommend patient continue working on her massaging and use the steroid cream especially on her nipple. Electronically signed by Samuel Reyes MD on 9/21/2022 at 12:33 PM      Drugs Prescribed:  New Prescriptions    No medications on file       Other Orders Placed:  No orders of the defined types were placed in this encounter.

## 2022-09-22 ENCOUNTER — HOSPITAL ENCOUNTER (OUTPATIENT)
Dept: RADIATION ONCOLOGY | Age: 47
Discharge: HOME | End: 2022-09-22
Payer: COMMERCIAL

## 2022-09-22 PROCEDURE — 77387 GUIDANCE FOR RADJ TX DLVR: CPT | Performed by: RADIOLOGY

## 2022-09-22 PROCEDURE — 77300 RADIATION THERAPY DOSE PLAN: CPT | Performed by: RADIOLOGY

## 2022-09-22 PROCEDURE — G6002 STEREOSCOPIC X-RAY GUIDANCE: HCPCS | Performed by: RADIOLOGY

## 2022-09-22 PROCEDURE — 77334 RADIATION TREATMENT AID(S): CPT | Performed by: RADIOLOGY

## 2022-09-22 PROCEDURE — 77412 RADIATION TX DELIVERY LVL 3: CPT | Performed by: RADIOLOGY

## 2022-09-23 ENCOUNTER — HOSPITAL ENCOUNTER (OUTPATIENT)
Dept: RADIATION ONCOLOGY | Age: 47
Discharge: HOME | End: 2022-09-23
Payer: COMMERCIAL

## 2022-09-23 PROCEDURE — 77412 RADIATION TX DELIVERY LVL 3: CPT | Performed by: RADIOLOGY

## 2022-09-23 PROCEDURE — 77336 RADIATION PHYSICS CONSULT: CPT | Performed by: RADIOLOGY

## 2022-09-23 PROCEDURE — 77387 GUIDANCE FOR RADJ TX DLVR: CPT | Performed by: RADIOLOGY

## 2022-09-23 PROCEDURE — G6002 STEREOSCOPIC X-RAY GUIDANCE: HCPCS | Performed by: RADIOLOGY

## 2022-09-26 ENCOUNTER — HOSPITAL ENCOUNTER (OUTPATIENT)
Dept: RADIATION ONCOLOGY | Age: 47
Discharge: HOME | End: 2022-09-26
Payer: COMMERCIAL

## 2022-09-26 PROCEDURE — 77387 GUIDANCE FOR RADJ TX DLVR: CPT | Performed by: RADIOLOGY

## 2022-09-26 PROCEDURE — G6002 STEREOSCOPIC X-RAY GUIDANCE: HCPCS | Performed by: RADIOLOGY

## 2022-09-26 PROCEDURE — 77412 RADIATION TX DELIVERY LVL 3: CPT | Performed by: RADIOLOGY

## 2022-09-27 ENCOUNTER — HOSPITAL ENCOUNTER (OUTPATIENT)
Dept: PHYSICAL THERAPY | Facility: CLINIC | Age: 47
Setting detail: THERAPIES SERIES
Discharge: HOME | End: 2022-09-27
Payer: COMMERCIAL

## 2022-09-27 ENCOUNTER — HOSPITAL ENCOUNTER (OUTPATIENT)
Dept: RADIATION ONCOLOGY | Age: 47
Discharge: HOME | End: 2022-09-27
Payer: COMMERCIAL

## 2022-09-27 PROCEDURE — 97140 MANUAL THERAPY 1/> REGIONS: CPT

## 2022-09-27 PROCEDURE — 77412 RADIATION TX DELIVERY LVL 3: CPT | Performed by: RADIOLOGY

## 2022-09-27 PROCEDURE — 77387 GUIDANCE FOR RADJ TX DLVR: CPT | Performed by: RADIOLOGY

## 2022-09-27 PROCEDURE — G6002 STEREOSCOPIC X-RAY GUIDANCE: HCPCS | Performed by: RADIOLOGY

## 2022-09-27 PROCEDURE — 97110 THERAPEUTIC EXERCISES: CPT

## 2022-09-27 NOTE — FLOWSHEET NOTE
[] [address]  [address]  [phone]  F: [phone] [] [address]  [facility]   [address]  P: [phone]  F: [phone] [x] [place] [facility]  [address]  P: [phone]  F: [phone] [] [address]  P: [phone]  F: [phone] [] [address]  [facility]   [address]   [place]: [phone]  F: [phone]      Physical Therapy Daily Treatment Note    Date:  2022  Patient Name:  Darryn Vick    :  1975  MRN: [id number]  Physician: Trilby Canavan, MD                                 Insurance: [employer] 60 visits  Medical Diagnosis: Malignant neoplasm of upper-outer quadrant of left breast in female, ER+     Rehab Codes: C50.412, Z17.0  Onset date: 22                 Next Dr's appt. : 22  Visit# / total visits:   Cancels/No Shows: 0    Subjective:  Pt states radiation is going ok, notes incr bone pain after radiation yesterday. States chest is tender and sore today and notes a bruise on stomach from her insulin she doesn't normally get. Wearing sleeve and spandex bras daily, sees lymphedema Thursday and hopeful to get better bras. Axilla feels full and heavy, breast not as much as before and arm feels ok, no fullness/heaviness in arm. Pain:  [x] Yes  [] No Location: L chest Pain Rating: (0-10 scale) 1/10  Pain altered Tx:  [x] No  [] Yes  Action:  Comments:    Objective:  Modalities:   Precautions: L BrCa, S/p lumpectomy and sentinel LN biopsy 22. No chemo, endocrine post radiation.  Radiation 5x4 weeks 22-  Manual:Utilized PORi breast protocol to LEFT upper quarter for gentle manual lymphatic drainage, myofascial release and joint mobility to facilitate better function, ROM, tissue mobility and dynamics of lymph system. Exercises:  Exercise Reps/ Time Weight/ Level Comments             Diaphragmatic breathing 5 cycles       Elbow winging 1m       Supine cane flexion 10x  HEP               Bye bye's 3x ea   Flex, abd   Scapular retraction  10x      Post rolls  10x               Wall flexion slides  10x      SL ABD 10x * ER, HAB   Doorway pec stretch (advanced)    *              Other:  L shoulder AROM: flex 157° \"tight /pulling\" axilla before Rx, after 162°      Treatment Charges: Mins Units   []  Modalities     [x]  Ther Exercise 10 1   [x]  Manual Therapy 45 3   []  Ther Activities     []  Aquatics     []  Vasocompression     []  Other     Total Treatment time 55        Assessment: [x] Progressing toward goals. Min redness around nipple, axilla and inf breast, cont's w/ bruising around nipple and  mod fullness noted lateral breast and into axilla, very TTP. Completed PORI breast protocol to L upper quarter, sig tightness and TTP pec, axilla and subscapular muscles. Emphasized importance of use of sleeve through radiation and 30 days after as well as compression bra daily. Rev HEP and added SL HAB this date. Rev self breast MLD. Encouraged daily hydration, nutrition and moisturizing through radiation as well as HEP. Will cont 1x weekly through radiation. [] No change. [] Other:  [x] Patient would continue to benefit from skilled physical therapy services in order to: address the following goals    STG: (to be met in 12 treatments)  ? Pain: Stabilize L shoulder and chest pain and ensure optimal management of pain during all ADLs ( home, occupation, community and recreation)  2.   ? ROM: Able to sustain AROM of bilat shoulders to be able to position arm for radiation  3.   ? Strength: L shoulder grossly 5/5 and demo good postural and core stability  4.   ? Function: Pt to report incr ease w/ adv ADL's, lifting and reaching OH  5.    Independent with Home Exercise Programs as demonstrated by performance with correct form without cues. LTG: (to be met in 18 treatments)  1. Optimize L shoulder functional ROM to improve QOL  2. Improve tissue mobility of chest wall and axilla to allow for more normal motion and function  3. Continue activity to decrease risk factors associated with increased time being sedentary while undergoing chemotherapy, radiation, surgery. 4. Control/mitigate side effects/late effects of Cancer treatment  5. Maintain BFI score for activity tolerance, and UEFS for overall improved function                 Patient goals: to prevent any complications from radiation    Pt. Education:  [x] Yes  [] No  [x] Reviewed Prior HEP/Ed  Method of Education: [x] Verbal  [x] Demo  [x] Written  Comprehension of Education:  [x] Verbalizes understanding. [] Demonstrates understanding. [] Needs review. [] Demonstrates/verbalizes HEP/Ed previously given. Plan: [x] Continue current frequency toward long and short term goals.     [x] Specific Instructions for subsequent treatments: cont w/ manual and progress as nate      Time In:10:00 am            Time Out: 10:55 am    Electronically signed by:  Latrice Nguyen PT , MOIRA

## 2022-09-28 ENCOUNTER — HOSPITAL ENCOUNTER (OUTPATIENT)
Dept: RADIATION ONCOLOGY | Age: 47
Discharge: HOME | End: 2022-09-28
Payer: COMMERCIAL

## 2022-09-28 VITALS
RESPIRATION RATE: 16 BRPM | WEIGHT: 212.6 LBS | SYSTOLIC BLOOD PRESSURE: 155 MMHG | TEMPERATURE: 97.1 F | BODY MASS INDEX: 36.49 KG/M2 | DIASTOLIC BLOOD PRESSURE: 83 MMHG

## 2022-09-28 PROCEDURE — 77412 RADIATION TX DELIVERY LVL 3: CPT | Performed by: RADIOLOGY

## 2022-09-28 PROCEDURE — 77387 GUIDANCE FOR RADJ TX DLVR: CPT | Performed by: RADIOLOGY

## 2022-09-28 PROCEDURE — G6002 STEREOSCOPIC X-RAY GUIDANCE: HCPCS | Performed by: RADIOLOGY

## 2022-09-28 PROCEDURE — 77427 RADIATION TX MANAGEMENT X5: CPT | Performed by: RADIOLOGY

## 2022-09-28 ASSESSMENT — PAIN DESCRIPTION - LOCATION: LOCATION: BREAST

## 2022-09-28 ASSESSMENT — PAIN SCALES - GENERAL: PAINLEVEL_OUTOF10: 2

## 2022-09-28 ASSESSMENT — PAIN DESCRIPTION - ORIENTATION: ORIENTATION: LEFT

## 2022-09-28 NOTE — PROGRESS NOTES
April L Toeppe  9/28/2022  Wt Readings from Last 3 Encounters:   09/28/22 212 lb 9.6 oz (96.4 kg)   09/21/22 213 lb 6.4 oz (96.8 kg)   09/14/22 212 lb 6.4 oz (96.3 kg)     Body mass index is 36.49 kg/m². Treatment Area:  Left Breast    Patient was seen today for weekly visit. Comfort Alteration    Fatigue: Mild    Nutritional Alteration  Anorexia: No   Nausea: Yes   Vomiting: No     Mucous Membrane Alteration  Drainage: No  Lymphedema: No    Skin Alteration   Sensation:wdl    Radiation Dermatitis:  Intact [x]     Erythema  [x]     Discoloration  []     Rash []     Dry desquamation  []     Moist desquamation []       Emotional  Coping: effective      Injury, potential bleeding or infection:     Lab Results   Component Value Date    WBC 13.9 (H) 06/22/2022     06/22/2022         BP (!) 155/83   Temp 97.1 °F (36.2 °C) (Temporal)   Resp 16   Wt 212 lb 9.6 oz (96.4 kg)   LMP 11/24/2011   BMI 36.49 kg/m²      Pain Assessment: 0-10  Pain Level: 2           Assessment/Plan: Patient was seen today for weekly visit. Tylenol #3 is helpful for pain at bedtime. Skin has slight erythema. She is using her steroid cream as instructed.       Kenna Dance, RN

## 2022-09-28 NOTE — PROGRESS NOTES
April L Toeppe  9/28/2022  Wt Readings from Last 3 Encounters:   09/28/22 212 lb 9.6 oz (96.4 kg)   09/21/22 213 lb 6.4 oz (96.8 kg)   09/14/22 212 lb 6.4 oz (96.3 kg)     Body mass index is 36.49 kg/m². Treatment Area:  Left Breast    Patient was seen today for weekly visit. Comfort Alteration    Fatigue: Mild    Nutritional Alteration  Anorexia: No   Nausea: Yes   Vomiting: No     Mucous Membrane Alteration  Drainage: No  Lymphedema: No    Skin Alteration   Sensation:wdl    Radiation Dermatitis:  Intact [x]     Erythema  [x]     Discoloration  []     Rash []     Dry desquamation  []     Moist desquamation []       Emotional  Coping: effective      Injury, potential bleeding or infection:     Lab Results   Component Value Date    WBC 13.9 (H) 06/22/2022     06/22/2022         BP (!) 155/83   Temp 97.1 °F (36.2 °C) (Temporal)   Resp 16   Wt 212 lb 9.6 oz (96.4 kg)   LMP 11/24/2011   BMI 36.49 kg/m²      Pain Assessment: 0-10  Pain Level: 2           Assessment/Plan: Patient was seen today for weekly visit. She reports shooting pains and nipple sensitivity. Tylenol #3 is helpful for pain at bedtime. Skin has slight erythema. She is using her steroid cream as instructed.       Darrick Hernandez RN

## 2022-09-28 NOTE — PROGRESS NOTES
[facility] [address]       Radiation Oncology          [address] [facility]          [place]        [place] Solares: [phone]        F: [phone]       [place] [address] [facility]       Radiation Oncology   [address] [facility], [address] [facility]       [place] Solares: [phone]       F: [phone]       [email or web address]          RADIATION ONCOLOGY WEEKLY PROGRESS NOTE  Patient ID:   April YAMEL Avila  : 1975   MRN: [id number]    Location:  [facility]iation [facility],   [address] [facility], [place] [place]   [phone]     DIAGNOSIS:  Cancer Staging  Malignant neoplasm of upper-outer quadrant of left breast in female, estrogen receptor positive ([facility] [address]  Staging form: Breast, AJCC 8th Edition  - Clinical stage from 2022: Stage IA (cT1, cN0, cM0, G1, ER+, HI+, HER2-) - Signed by Tanya Davenport MD on 2022      TREATMENT DETAILS:  Treatment Site: L Breast  Actual Dose: 4256cGy  Total Planned Dose: 4256cGy + 1000cGy  Treatment Technique: 3D-CRT  Fraction Technique: Daily  Therapy imaging monitoring: KV match daily with MV ports  Concurrent Chemotherapy: NA    SUBJECTIVE:   Patient seen for their weekly on treatment evaluation today. Patient is doing well though she does note some redness and sensitivity of the skin. She notes some left shoulder pain especially at her treatments. She also notes she got a bruise on her abdomen after her insulin trauma. He has been using ibuprofen.     OBJECTIVE:   CHAPERONE: Family/friend/companieon Present    ECO Asymptomatic    VITAL SIGNS: BP (!) 155/83   Temp 97.1 °F (36.2 °C) (Temporal)   Resp 16   Wt 212 lb 9.6 oz (96.4 kg)   LMP 2011   BMI 36.49 kg/m²   Wt Readings from Last 5 Encounters:   22 212 lb 9.6 oz (96.4 kg)   22 213 lb 6.4 oz (96.8 kg)   22 212 lb 6.4 oz (96.3 kg)   08/15/22 213 lb 3.2 oz (96.7 kg)   22 213 lb 3.2 oz (96.7 kg)     GENERAL: General appearance is that of a well-nourished, well-developed in no apparent distress. LUNGS:  Pulmonary effort normal. Normal breath sounds. EXTREMITIES:  No edema. Normal ROM. NEUROLOGICAL: Alert and oriented. Strength and sensation intact bilaterally. PSYCH: Mood normal, behavior normal.  SKIN: Mild erythema, no desquamation. (+) Abdomen bruise. LABS:  WBC   Date Value Ref Range Status   06/22/2022 13.9 (H) 3.5 - 11.3 k/uL Final     Segs Absolute   Date Value Ref Range Status   06/22/2022 10.17 (H) 1.50 - 8.10 k/uL Final     Hemoglobin   Date Value Ref Range Status   06/22/2022 13.4 11.9 - 15.1 g/dL Final     Platelets   Date Value Ref Range Status   06/22/2022 357 138 - 453 k/uL Final     Creatinine   Date Value Ref Range Status   06/22/2022 0.58 0.50 - 0.90 mg/dL Final   07/11/2017 0.54 0.50 - 0.90 mg/dL Final     No results found for: , CEA  No results found for: PSA    MEDICATIONS:    Current Outpatient Medications:     acetaminophen-codeine (TYLENOL #3) 300-30 MG per tablet, Take 1 tablet by mouth 2 times daily as needed for Pain for up to 14 days. , Disp: 28 tablet, Rfl: 0    betamethasone valerate (VALISONE) 0.1 % cream, Apply topically 2-3 times daily. , Disp: 45 g, Rfl: 2    fluconazole (DIFLUCAN) 150 MG tablet, TAKE 1 TABLET BY MOUTH ONCE FOR 1 DOSE REPEAT DOSE IN 7 DAYS. (Patient not taking: Reported on 9/14/2022), Disp: 2 tablet, Rfl: 0    docusate sodium (COLACE) 100 MG capsule, Take 1 capsule by mouth 2 times daily (Patient not taking: Reported on 8/15/2022), Disp: 20 capsule, Rfl: 0    tiZANidine (ZANAFLEX) 2 MG capsule, TAKE 1 TABLET BY MOUTH AT BEDTIME AS NEEDED (Patient not taking: Reported on 8/15/2022), Disp: , Rfl:     Insulin Degludec (TRESIBA) 100 UNIT/ML SOLN, Inject 14 Units into the skin daily , Disp: , Rfl:     OZEMPIC, 0.25 OR 0.5 MG/DOSE, 2 MG/1.5ML SOPN, INJECT 0.25MG ONCE WEEKLY AS DIRECTED 30, Disp: , Rfl:     escitalopram (LEXAPRO) 10 MG tablet, TAKE 1 TABLET BY MOUTH TWICE A DAY, Disp: , Rfl:     naproxen (NAPROSYN) 500 MG tablet, TAKE 1 TABLET BY MOUTH EVERY 12 HOURS WITH FOOD OR MILK AS NEEDED FOR 30 DAYS (Patient not taking: Reported on 8/15/2022), Disp: , Rfl:     clotrimazole-betamethasone (LOTRISONE) 1-0.05 % cream, Apply to affected area bid externally x 4 days then daily for 3 days (Patient not taking: Reported on 7/22/2022), Disp: 45 g, Rfl: 1      ASSESSMENT PLAN:   Treatment setup and plan reviewed. Port images/CBCT images reviewed. Appropriate laboratory work was reviewed. Treatment side effects and toxicities reviewed with the patient, and appropriate management was advised. Will continue radiation treatment as planned, and recommend patient contact us if they have any questions or concerns. Recommend patient continue working on her massaging and use the steroid cream especially on her nipple. Bruising is likely due to her NSAID use. We do recommend she continue using ibuprofen now to help with shoulder pain as it is likely positional from treatment. Electronically signed by Mitchell Dong MD on 9/28/2022 at 12:07 PM      Drugs Prescribed:  New Prescriptions    No medications on file       Other Orders Placed:  No orders of the defined types were placed in this encounter.

## 2022-09-29 ENCOUNTER — OFFICE VISIT (OUTPATIENT)
Dept: ONCOLOGY | Age: 47
End: 2022-09-29
Payer: COMMERCIAL

## 2022-09-29 ENCOUNTER — HOSPITAL ENCOUNTER (OUTPATIENT)
Dept: RADIATION ONCOLOGY | Age: 47
Discharge: HOME | End: 2022-09-29
Payer: COMMERCIAL

## 2022-09-29 ENCOUNTER — HOSPITAL ENCOUNTER (OUTPATIENT)
Dept: OCCUPATIONAL THERAPY | Age: 47
Setting detail: THERAPIES SERIES
Discharge: HOME | End: 2022-09-29
Payer: COMMERCIAL

## 2022-09-29 DIAGNOSIS — Z17.0 MALIGNANT NEOPLASM OF UPPER-OUTER QUADRANT OF LEFT BREAST IN FEMALE, ESTROGEN RECEPTOR POSITIVE (HCC): Primary | ICD-10-CM

## 2022-09-29 DIAGNOSIS — C50.412 MALIGNANT NEOPLASM OF UPPER-OUTER QUADRANT OF LEFT BREAST IN FEMALE, ESTROGEN RECEPTOR POSITIVE (HCC): Primary | ICD-10-CM

## 2022-09-29 PROCEDURE — 93702 BIS XTRACELL FLUID ANALYSIS: CPT | Performed by: INTERNAL MEDICINE

## 2022-09-29 PROCEDURE — 77280 THER RAD SIMULAJ FIELD SMPL: CPT | Performed by: RADIOLOGY

## 2022-09-29 PROCEDURE — 99212 OFFICE O/P EST SF 10 MIN: CPT | Performed by: INTERNAL MEDICINE

## 2022-09-29 PROCEDURE — 97535 SELF CARE MNGMENT TRAINING: CPT

## 2022-09-29 PROCEDURE — 77412 RADIATION TX DELIVERY LVL 3: CPT | Performed by: RADIOLOGY

## 2022-09-29 NOTE — PROGRESS NOTES
TREATMENT LOCATION:   [] [address]   [facility] [address]: [phone]  F: [phone] [x] [address]: [phone]  F: [phone]        Lymphedema Services - Treatment Note of the Upper Extremity/Breast    Date:  2022  Patient: Jessy Blanca  : 1975             MRN: [id number]  Referring Physician: Rut Andrews MD       Phone: [phone]  Fax: [phone]  Insurance: [employer] (TB:[id number] ) - visits BMN, no co-pay  Medical Diagnosis: C50.412, Z17.0 (ICD-10-CM) - Malignant neoplasm of upper-outer quadrant of left breast in female, estrogen receptor positive ([facility] [address]  Rehab Codes: I89.0, C50.412, Z17.0             Onset Date: 6/3/22  Visit# / total visits: 2/2 scheduled; Progress note due at visit 4 per POC   ( Certification Dates: 22- 22)    Contraindications/Precautions:   None      Subjective: Pt has 4 radiation sessions left. SOZO  today with pt returning to baseline. Pain: [] YES    [x] NO     Location: n/a      Pain Rating: ( 0-10 scale) : 0/10  Comments: tender L axilla    Plan for next session:  review garments, review self-MLD         Objective:   [x] Measurement [] Bandaging [] MLD [] Skin care   [x] Education [] Self-bandaging [x] Self-MLD [] Wound Care   [] Exercise [] Caregiver training [] Kinesiotaping [] Other:    [] Nutrition [] Garment fitting/training [] Vasopneumatic Pump       2022 observations: L breast with mild fullness; redness 2/2 radiation    Circumferential Measurements   Measurements taken from nail base of D3 digit. Fingers are measured at the base.    Measurements (cm) Right Left   Dorsum 10 19.1 19.1   Wrist 15 15.7 15.8   Lower Forearm 21 20.6 19.0   Upper Forearm 33 28.3 26.9   Olecranon 40 29.6 26.8   Lower Bicep 47 34.3 30.7   Upper Bicep 55 39.7 36.4   Initial Total 2022:   187.3 174.7        Chest at axilla 22: 104 cm  Chest at nipple line 22: 116.2 cm    Assessment:     Based on SOZO results pt is back to baseline. Review wearing schedule to include daily wear for remainder of radiation and 30 days to follow. Pt may then wear preventatively during high risk activity or in response to mild symptoms. Time spent discussing chest compression for swelling in this region. Pt has been attempting sports bras without much luck. Recommending Langlade Wear (XL) and/or Jobst Belisse Bra (C/D 44\"). Pt is measured and writer to follow up with insurance to verify coverage. Self-MLD education initiated utilizing handouts below along with visual, verbal, and tactile cueing for proper sequence and hands on technique. Pt demo good  teachback following instruction. Self-MLD Education     What is MLD?: MLD (or Manual Lymph Drainage) engages healthy lymph nodes & vessels in the body to create a suctioning effect to draw fluid away from full/affected areas. Additionally, we are teaching your lymphatic system to reroute fluid to healthy lymph vessels, which then drain into the venous system. It is recommended that you complete your self-MLD 2x per day. You are welcome to complete more if you'd like. Skin on skin contact is best to achieve best stretch. It may be easiest for you to complete self-MLD while you are in bed or reclined. Therefore, you may want to complete in the AM after you wake up and in the PM before you go to bed. Tips:   Self-MLD should be completed SLOWLY. Please do not rush or MLD is not effective. Remember to stretch and let go to allow the skin to recoil. Pressure applied = the amount of pressure you should apply when stroking a s head. Stretches should be large enough to stretch the skin, but NOT slide over the skin. No rubbing or petting the skin. Self-MLD Pre-Treatment for Left Upper Quadrant     1. Downward/inward stretches to collar bone x10 - located just above your collar bone.      2.  Deep breathing x5 - belly expands as you breathe in and deflates as you breathe out. Breathe in through the nose, out through the mouth. 3. Downward skin stretches at the left groin to stimulate lymph nodes x10     4. Skin stretches from left armpit down to left groin x5, with extra stretches at assisted point      5. Skin stretches at the right armpit and left armpits to stimulate lymph nodes x10     6. Skin stretches from left armpit across to right armpit x5, with extra stretches at assisted point       7. Addressing areas of swelling over breast/in armpit: Think of the pathway(s) above as a major highway(s) to the lymph nodes at your groin/healthy armpit. Now, find where you are swollen over the chest and move fluid (using skin stretches) to the major highway(s) and down to the groin/over to the armpit. [x] Progressing toward goals. [x] Patient would continue to benefit from skilled occupational therapy services in order to address goals below. [] No change. [x] Treatment hold: until radiation complete unless issues arise beforehand  [] No further treatment/discharge  [] Other:                    Therapy Goals:   Short Term Goals to be met within 2 visits  Pt will demonstrate compliance of maintaining lymphedema precautions to reduce the risks of progression of lymphedema beyond stage 0/pre-symptomatic stage met 9/29/22     Pt will be able to verbalize early signs/symptoms of lymphedema in order to effectively prevent onset of symptomatic lymphedema to LUQ. Met 9/29/22     Pt will demo understanding of preventative compression sleeve wearing schedule along with independent donning/doffing to prevent onset of symptomatic lymphedema.  Met 9/29/22     Long Term Goals to be met within 3 visits     Pt will be monitored for progression into lymphedema stages 1 and beyond as evidenced by measurable change in swelling, observable skin changes, and/or subjective pt reports at which time goals will be updated accordingly to reflect need for decongestive therapy. Met 9/29/22    New goals to be addressed within 3 additional visits      Pt will demonstrate competence with SELF-MLD ( manual lymphatic drainage) in order to reroute lymphatic pathways for decreased swelling. Pt will demonstrate competence with decongestive therapy techniques which may include self-MLD, kinesiotaping, vasopneumatic device, and exercises in order to reroute lymphatic pathways for decreased swelling in L breast AEB decrease in visible fullness and decrease in measurements by 2+cm. Pt. Education:  [x] Yes  [] No  [x] Reviewed Prior HEP/Ed  Method of Education: [x] Verbal  [x] Demo  [x] Written  Comprehension of Education:  [x] Verbalizes understanding. [x] Demonstrates understanding. [x] Needs review.   [] No understanding  Knowledge of home program:  [x] Good [] Fair [] Poor [] With assist from family/caregiver        Plan:   [x] Continue x3 visits including today 1x/week         Treatment Charges   Minutes   Units   Re-evaluation (32507)               $66.82/$50.50                                                             Manual Therapy (45187):                                      $26.27 / $20.83     Therapeutic activities ():                             $35.63/ $25.68     Therapeutic Exercise (00239)                              $28.50/$ 22.29     Self care/home mgmt (95181)                              $31.61/ $23.84 60 4   Vasopneumatic Device (21347)                           $8.99     Total Treatment Time    60            Time In:  1000  Time Out: 1100      Electronically signed by Socorro Fuentes OT on 9/29/2022 at 10:17 AM

## 2022-09-29 NOTE — PROGRESS NOTES
Patient:  Netta Avila   MRN:  [id number]   PCP:  Lauren Smith MD   Oncologist Dr. Francisco Jaquez    Date of test:  9/29/2022   Tested Body Part:  Left Arm  Testing Stage:  Surveillance    SOZO Lymphedema Assessment:  Patient was in seeing lymphedema today for a follow up. Patient number has came down since last measurement. SOZO Measurement Left:  5.3    SOZO Measurement Right:            You will be re-tested following cancer treatment:  Every 3 months for the first 3 years following treatment  Every 6 months during years 4 and 5 following treatment  Annually thereafter    Currently you are following the 3 Months follow up treatment.   Next Scheduled visit:  12/9/2022

## 2022-09-30 ENCOUNTER — HOSPITAL ENCOUNTER (OUTPATIENT)
Dept: RADIATION ONCOLOGY | Age: 47
Discharge: HOME | End: 2022-09-30
Payer: COMMERCIAL

## 2022-09-30 PROCEDURE — G6002 STEREOSCOPIC X-RAY GUIDANCE: HCPCS | Performed by: RADIOLOGY

## 2022-09-30 PROCEDURE — 77387 GUIDANCE FOR RADJ TX DLVR: CPT | Performed by: RADIOLOGY

## 2022-09-30 PROCEDURE — 77412 RADIATION TX DELIVERY LVL 3: CPT | Performed by: RADIOLOGY

## 2022-10-03 ENCOUNTER — HOSPITAL ENCOUNTER (OUTPATIENT)
Dept: RADIATION ONCOLOGY | Age: 47
Discharge: HOME | End: 2022-10-03
Payer: COMMERCIAL

## 2022-10-03 ENCOUNTER — HOSPITAL ENCOUNTER (OUTPATIENT)
Dept: PHYSICAL THERAPY | Facility: CLINIC | Age: 47
Setting detail: THERAPIES SERIES
Discharge: HOME | End: 2022-10-03
Payer: COMMERCIAL

## 2022-10-03 PROCEDURE — 97140 MANUAL THERAPY 1/> REGIONS: CPT

## 2022-10-03 PROCEDURE — G6002 STEREOSCOPIC X-RAY GUIDANCE: HCPCS | Performed by: RADIOLOGY

## 2022-10-03 PROCEDURE — 77412 RADIATION TX DELIVERY LVL 3: CPT | Performed by: RADIOLOGY

## 2022-10-03 PROCEDURE — 77387 GUIDANCE FOR RADJ TX DLVR: CPT | Performed by: RADIOLOGY

## 2022-10-03 PROCEDURE — 97110 THERAPEUTIC EXERCISES: CPT

## 2022-10-03 NOTE — FLOWSHEET NOTE
[] [address]  [address]  [phone]  F: [phone] [] [address]  [facility] 36   [address]  P: [phone]  F: [phone] [x] [facility]  [address]  P: [phone]  F: [phone] [] [address]  P: [phone]  F: [phone] [] [address]  [facility]   [address]   [place]: [phone]  F: [phone]      Physical Therapy Daily Treatment Note    Date:  10/3/2022  Patient Name:  Thaddeus Schroeder    :  1975  MRN: [id number]  Physician: Vahid Ann MD                                 Insurance: [employer] 60 visits  Medical Diagnosis: Malignant neoplasm of upper-outer quadrant of left breast in female, ER+     Rehab Codes: C50.412, Z17.0  Onset date: 22                 Next Dr's appt. : 22  Visit# / total visits:   Cancels/No Shows: 0    Subjective:  Pt states her skin is very angry, itchy, red and swollen and tender kat nipple. Notes started booster last Thursday, tomorrow is last day of radiation. Axilla still feels full and heavy. Saw lymphedema and looking into compression bras, did order one online. Pain:  [x] Yes  [] No Location: L chest Pain Rating: (0-10 scale) 1/10  Pain altered Tx:  [x] No  [] Yes  Action:  Comments:    Objective:  Modalities:   Precautions: L BrCa, S/p lumpectomy and sentinel LN biopsy 22. No chemo, endocrine post radiation. Radiation 5x4 weeks 22-10/4/22  Manual:Utilized PORi breast protocol to LEFT upper quarter for gentle manual lymphatic drainage, myofascial release and joint mobility to facilitate better function, ROM, tissue mobility and dynamics of lymph system.    Exercises:  Exercise Reps/ Time Weight/ Level Comments Diaphragmatic breathing 5 cycles       Elbow winging 1m       Supine cane flexion 10x  HEP               Bye bye's 3x ea   Flex, abd   Scapular retraction  10x      Post rolls  10x               Wall flexion slides  10x      SL ABD, ER, HAB 10x *    Doorway pec stretch (advanced)    *              Other:  L shoulder AROM: flex 157° \"tight /pulling\" axilla before Rx, after 162°      Treatment Charges: Mins Units   []  Modalities     [x]  Ther Exercise 10 1   [x]  Manual Therapy 45 3   []  Ther Activities     []  Aquatics     []  Vasocompression     []  Other     Total Treatment time 55        Assessment: [x] Progressing toward goals. Min redness around nipple, axilla and inf breast, cont's w/ bruising around nipple and  mod fullness noted lateral breast and into axilla, very TTP. Completed PORI breast protocol to L upper quarter, sig tightness and TTP pec, axilla and subscapular muscles. Rev HEP and progressed SL scapular stabilization ex w/ good nate. Issued HO for HEP. Emphasized importance of use of sleeve through radiation and 30 days after as well as compression bra daily. Encouraged daily hydration, nutrition and moisturizing through radiation as well as HEP. Will cont 1x weekly for manual and ex progression. [] No change. [] Other:  [x] Patient would continue to benefit from skilled physical therapy services in order to: address the following goals    STG: (to be met in 12 treatments)  ? Pain: Stabilize L shoulder and chest pain and ensure optimal management of pain during all ADLs ( home, occupation, community and recreation)  2.   ? ROM: Able to sustain AROM of bilat shoulders to be able to position arm for radiation- MET  3.   ? Strength: L shoulder grossly 5/5 and demo good postural and core stability  4.   ? Function: Pt to report incr ease w/ adv ADL's, lifting and reaching OH  5. Independent with Home Exercise Programs as demonstrated by performance with correct form without cues. LTG: (to be met in 18 treatments)  1. Optimize L shoulder functional ROM to improve QOL  2. Improve tissue mobility of chest wall and axilla to allow for more normal motion and function  3. Continue activity to decrease risk factors associated with increased time being sedentary while undergoing chemotherapy, radiation, surgery. 4. Control/mitigate side effects/late effects of Cancer treatment  5. Maintain BFI score for activity tolerance, and UEFS for overall improved function                 Patient goals: to prevent any complications from radiation    Pt. Education:  [x] Yes  [] No  [x] Reviewed Prior HEP/Ed  Method of Education: [x] Verbal  [x] Demo  [x] Written  Comprehension of Education:  [x] Verbalizes understanding. [] Demonstrates understanding. [] Needs review. [] Demonstrates/verbalizes HEP/Ed previously given. Plan: [x] Continue current frequency toward long and short term goals.     [x] Specific Instructions for subsequent treatments: cont w/ manual and progress as nate      Time In: 10:00 am            Time Out: 10:55 am    Electronically signed by:  Mercedez Arevalo, PT , MOIRA

## 2022-10-04 ENCOUNTER — HOSPITAL ENCOUNTER (OUTPATIENT)
Dept: RADIATION ONCOLOGY | Age: 47
Discharge: HOME | End: 2022-10-04
Payer: COMMERCIAL

## 2022-10-04 PROCEDURE — G6002 STEREOSCOPIC X-RAY GUIDANCE: HCPCS | Performed by: RADIOLOGY

## 2022-10-04 PROCEDURE — 77387 GUIDANCE FOR RADJ TX DLVR: CPT | Performed by: RADIOLOGY

## 2022-10-04 PROCEDURE — 77412 RADIATION TX DELIVERY LVL 3: CPT | Performed by: RADIOLOGY

## 2022-10-11 ENCOUNTER — TELEPHONE (OUTPATIENT)
Dept: ONCOLOGY | Age: 47
End: 2022-10-11

## 2022-10-11 NOTE — TELEPHONE ENCOUNTER
Name: Netta Avila  : 1975  MRN: [id number]    Oncology Navigation Follow-Up Note    Contact Type:  Telephone    Notes:Writer spoke with pt for ONN f/u. She reports she has had 4 black/bloody stools in the last few days. Writer strongly encouraged pt to call her PCP or present to urgent care for further evaluation asap. She verbalized understanding. Pt has writer's contact information and encouraged to contact writer with any concerns. Will continue to follow.      Electronically signed by Lina Salas RN on 10/11/2022 at 8:36 AM

## 2022-10-11 NOTE — PROGRESS NOTES
[facility]            Radiation Oncology          [address]          [facility] [address]        Maye Watkinserty: [phone]        F: [phone]       [address]       Radiation Oncology   [address] [facility], [address] [place] [address]       Maye Watkinserty: [phone]       F: [phone]       [facility]      Dear Dr Tia Reed: Thank you for referring Netta Avila to me for evaluation and treatment. Below is a summary of the patient's recently completed radiation course. If you have questions, please do not hesitate to call me. I look forward to following this patient along with you.     Sincerely,  Electronically signed by Alejandro Duarte MD on 10/11/22 at 6:06 PM EDT      CC: Patient Care Team:  Linda Coleman MD as PCP - General (Family Medicine)  Leopoldo Church DO as Consulting Physician (Obstetrics & Gynecology)  Markel Blue RN as Nurse Navigator (Oncology)  ------------------------------------------------------------------------------------------------------------------------------------------------------------------------------------------        Date of Service: 10/4/2022     Location:  [facility] Radiation Oncology,   [address] [facility]   [phone]        RADIATION ONCOLOGY END OF TREATMENT SUMMARY:    Patient ID:   Netta Avila  : 1975   MRN: [id number]    DIAGNOSIS:  Cancer Staging  Malignant neoplasm of upper-outer quadrant of left breast in female, estrogen receptor positive ([facility] [address]  Staging form: Breast, AJCC 8th Edition  - Clinical stage from 2022: Stage IA (cT1, cN0, cM0, G1, ER+, HI+, HER2-) - Signed by Alejandro Duarte MD on 2022      TREATMENT DETAILS:    Treatment Technique: 3D-CRT  Fraction Technique: Daily          Concurrent Chemotherapy: NA    CLINICAL COURSE:    Patient completed the treatment as prescribed and tolerated treatment as expected. Patient developed skin erythema and fatigue. Patient will come back in 1 month for a follow up visit and to assess treatment toxicity and response. Patient was advised to continue close follow up with medical oncology and surgery as well. Patient does have our contact information in case they have any questions or concerns in the interim.     Electronically signed by Herlinda Joiner MD on 10/11/2022 at 6:06 PM

## 2022-10-12 RX ORDER — FLUCONAZOLE 150 MG/1
TABLET ORAL
Qty: 2 TABLET | Refills: 0 | Status: SHIPPED | OUTPATIENT
Start: 2022-10-12

## 2022-10-13 ENCOUNTER — HOSPITAL ENCOUNTER (OUTPATIENT)
Dept: WOMENS IMAGING | Age: 47
Discharge: HOME | End: 2022-10-15
Payer: COMMERCIAL

## 2022-10-13 DIAGNOSIS — R92.8 ABNORMAL MAGNETIC RESONANCE IMAGING OF LEFT BREAST: ICD-10-CM

## 2022-10-13 DIAGNOSIS — Z85.3 HISTORY OF LEFT BREAST CANCER: Primary | ICD-10-CM

## 2022-10-13 PROCEDURE — 76642 ULTRASOUND BREAST LIMITED: CPT

## 2022-10-13 NOTE — PROGRESS NOTES
Called pt to discuss follow up US results, BIRADS 3 reading although no new masses/lesions noted. Discussed with pt, option of either repeat US in 3 months vs breast MRI per Radiology recommendations, pt would prefer MRI, order placed.     Electronically signed by Alexi Duarte DO on 10/13/2022 at 10:52 AM

## 2022-10-14 ENCOUNTER — TELEPHONE (OUTPATIENT)
Dept: ONCOLOGY | Age: 47
End: 2022-10-14

## 2022-10-14 ENCOUNTER — OFFICE VISIT (OUTPATIENT)
Dept: ONCOLOGY | Age: 47
End: 2022-10-14
Payer: COMMERCIAL

## 2022-10-14 VITALS
WEIGHT: 214.7 LBS | HEART RATE: 97 BPM | TEMPERATURE: 97.3 F | BODY MASS INDEX: 36.85 KG/M2 | SYSTOLIC BLOOD PRESSURE: 147 MMHG | DIASTOLIC BLOOD PRESSURE: 84 MMHG

## 2022-10-14 DIAGNOSIS — Z17.0 MALIGNANT NEOPLASM OF UPPER-OUTER QUADRANT OF LEFT BREAST IN FEMALE, ESTROGEN RECEPTOR POSITIVE (HCC): Primary | ICD-10-CM

## 2022-10-14 DIAGNOSIS — C50.412 MALIGNANT NEOPLASM OF UPPER-OUTER QUADRANT OF LEFT BREAST IN FEMALE, ESTROGEN RECEPTOR POSITIVE (HCC): Primary | ICD-10-CM

## 2022-10-14 PROCEDURE — 99211 OFF/OP EST MAY X REQ PHY/QHP: CPT | Performed by: INTERNAL MEDICINE

## 2022-10-14 PROCEDURE — 99214 OFFICE O/P EST MOD 30 MIN: CPT | Performed by: INTERNAL MEDICINE

## 2022-10-14 RX ORDER — TAMOXIFEN CITRATE 20 MG/1
20 TABLET ORAL DAILY
Qty: 90 TABLET | Refills: 5 | Status: SHIPPED | OUTPATIENT
Start: 2022-10-14 | End: 2023-01-12

## 2022-10-14 NOTE — PROGRESS NOTES
Chief Complaint   Patient presents with    Follow-up     Review status of disease    Results     Go over scan      DIAGNOSIS:         Stage [id number] Well differentiated invasive ductal carcinoma of the left breast upper outer quadrant. ER positive, [place] positive, HER2/checo not amplified. Oncotype Dx with low recurrence score and no benefits of chemotherapy. CURRENT THERAPY:         S/p lumpectomy and sentinel LN biopsy 22  S/p radiation therapy completed 2022  Start Tamoxifen 2022. BRIEF CASE HISTORY:       Ms. Marci Lewis is a very pleasant 56 y.o. female with no major past health problems. . She was doing fine with routine mammogram being normal until this year where she had suspicious abnormalities in the mammogram done on 2022. Diagnostic mammogram 2022 showed area of architectural distortion at the left breast upper outer quadrant. . A biopsy was performed on 2022 and unfortunately that was positive for invasive ductal carcinoma. ER positive, NY positive, HER2/checo not amplified. The patient is seen today for further management of her breast cancer. The patient did very well after her biopsy without any complication. The patient had no symptom preceding her diagnosis of cancer. No chest pain, shortness of breath, cough, sputum or hemoptysis, no back pain or terry aches, no weight loss or decreased appetite, no fever or night sweating. No headaches, and no other complaints. INTERIM HISTORY:   Seen for follow up breast cancer. She had lumpectomy 22. No complications. Oncotype Dx showed low recuurence score. Completed radiation therapy. No complications. No complaints at the present time. GYNECOLOGICAL HISTORY  Menarche at age 16. Hysterectomy age 29, one ovary, endometriosis. +0. Age at first full term pregnancy 33. No use of HRT.     PAST MEDICAL HISTORY: has a past medical history of Anxiety, Carcinoma of upper-outer quadrant of left breast in female, estrogen receptor positive ([facility] [address] Endometriosis, GDM (gestational diabetes mellitus), and H/O hysterectomy for benign disease. PAST SURGICAL HISTORY: has a past surgical history that includes Tubal ligation ();  section; Hysterectomy (13); Enterocele repair (12); laparoscopy (13); US BREAST BIOPSY W LOC DEVICE 1ST LESION LEFT (Left, 2022); US PLACE BREAST LOC DEVICE 1ST LESION LEFT (Left, 2022); US PLACE BREAST LOC DEVICE EACH ADDL LEFT (Left, 2022); Breast surgery; Breast lumpectomy (Left, 2022); and Mastectomy (Left, 2022). CURRENT MEDICATIONS:  has a current medication list which includes the following prescription(s): tamoxifen, betamethasone valerate, tresiba, ozempic (0.25 or 0.5 mg/dose), escitalopram, fluconazole, docusate sodium, tizanidine, naproxen, and clotrimazole-betamethasone. ALLERGIES:  is allergic to ketorolac and norco [hydrocodone-acetaminophen]. FAMILY HISTORY: aunt 76s breast cancer, g father leukemia. Otherwise negative for any hematological or oncological conditions. SOCIAL HISTORY:  reports that she has never smoked. She has never used smokeless tobacco. She reports that she does not drink alcohol and does not use drugs. REVIEW OF SYSTEMS:     General: No weakness or fatigue. No unanticipated weight loss or decreased appetite. No fever or chills. Eyes: No blurred vision, eye pain or double vision. Ears: No hearing problems or drainage. No tinnitus. Throat: No sore throat, problems with swallowing or dysphagia. Respiratory: No cough, sputum or hemoptysis. No shortness of breath. No pleuritic chest pain. Cardiovascular: No chest pain, orthopnea or PND. No lower extremity edema. No palpitation. Gastrointestinal: No problems with swallowing. No abdominal pain or bloating. No nausea or vomiting. No diarrhea or constipation. No GI bleeding. Genitourinary: No dysuria, hematuria, frequency or urgency. Musculoskeletal: No muscle aches or pains. No limitation of movement. No back pain. No gait disturbance, No joint complaints. Dermatologic: No skin rashes or pruritus. No skin lesions or discolorations. Psychiatric: No depression, anxiety, or stress or signs of schizophrenia. No change in mood or affect. Hematologic: No history of bleeding tendency. No bruises or ecchymosis. No history of clotting problems. Infectious disease: No fever, chills or frequent infections. Endocrine: No polydipsia or polyuria. No temperature intolerance. Neurologic: No headaches or dizziness. No weakness or numbness of the extremities. No changes in balance, coordination,  memory, mentation, behavior. Allergic/Immunologic: No nasal congestion or hives. No repeated infections. PHYSICAL EXAM:  The patient is not in acute distress. Vital signs: Blood pressure (!) 147/84, pulse 97, temperature 97.3 °F (36.3 °C), temperature source Temporal, weight 214 lb 11.2 oz (97.4 kg), last menstrual period 11/24/2011, not currently breastfeeding. HEENT:  Eyes are normal. Ears, nose and throat are normal.  Neck: Supple. No lymph node enlargement. No thyroid enlargement. Trachea is centrally located. Chest:  Clear to auscultation. No wheezes or crepitations. Breast:  Left breast s/p lumpectomy. No masses. No skin or nippple abnormalities. No palpable lymph nodes. Heart: Regular sinus rhythm. Abdomen: Soft, nontender. No hepatosplenomegaly. No masses. Extremities:  With no edema. Lymph Nodes:  No cervical, axillary or inguinal lymph node enlargement. Neurologic:  Conscious and oriented. No focal neurological deficits. Psychosocial: No depression, anxiety or stress. Skin: No rashes, bruises or ecchymoses.       Review of Diagnostic data:   Lab Results   Component Value Date    WBC 13.9 (H) 06/22/2022    HGB 13.4 06/22/2022    HCT 40.8 06/22/2022    MCV 84.3 06/22/2022     06/22/2022       Chemistry        Component Value Date/Time     (L) 06/22/2022 0940    K 4.6 06/22/2022 0940    CL 97 (L) 06/22/2022 0940    CO2 24 06/22/2022 0940    BUN 10 06/22/2022 0940    CREATININE 0.58 06/22/2022 0940        Component Value Date/Time    CALCIUM 9.3 06/22/2022 0940    ALT 28 07/11/2017 0850          US BREAST LIMITED LEFT  Narrative: EXAMINATION:  TARGETED ULTRASOUND OF THE LEFT BREAST    10/13/2022    COMPARISON:  Mammographic imaging July 6, 2022 with targeted breast ultrasound July 6, 2022, June 9, 2022 and May 17, 2022. Ultrasound-guided biopsy May 19,   2022. Bilateral screening mammography May 11, 2022. HISTORY:  ORDERING SYSTEM PROVIDED HISTORY: Abnormal magnetic resonance imaging of   left  breast  TECHNOLOGIST PROVIDED HISTORY:  close surveillance of abnormal area of left breast, s/p lumpectomy for   known  malignancy    FINDINGS:  Dynamic grayscale ultrasound with color flow Doppler was used for   evaluation  of the left breast.  There is irregularity of breast tissue beneath the  region of the scar at 2-3 o'clock and 6-8 cm from the nipple compatible   with  postoperative changes and a small amount of fluid/edema in the adjacent   soft  tissues. In the left axilla is an incompletely defined hypoechoic structures  suggesting hematoma. This does not appear to be related to a lymph node. Impression: Findings compatible with postoperative changes left breast.  However,  considering the patient's history, bilateral breast MRI may be valuable   for  further evaluation. If bilateral breast MRI is not performed, follow-up  ultrasound is recommended in 3 months. BIRADS:  BIRADS - CATEGORY 3    Probably Benign Findings. A short interval follow-up left breast   ultrasound  is recommended in 3months if bilateral breast MRI is not performed. OVERALL ASSESSMENT - PROBABLY BENIGN. A letter of notification will be sent to the patient regarding the   results.     Core needle biopsy 5/19/2022 of left breast 2 o'clock position:  Final Diagnosis   LEFT BREAST, 2:00, ULTRASOUND-GUIDED CORE NEEDLE BIOPSIES:   - INVASIVE, WELL DIFFERENTIATED DUCTAL CARCINOMA. - ESTROGEN RECEPTOR POSITIVE. - PROGESTERONE RECEPTOR POSITIVE.   - SEE COMMENT. IMPRESSION:   Stage [id number] Well differentiated invasive ductal carcinoma of the left breast upper outer quadrant. ER positive, [place] positive, HER2/checo not amplified. PLAN: I again explained to the patient and her companions the nature of breast cancer, staging, prognosis and treatment. The pathology results were reviewed and explained in layman language. Patient presents with early stage malignancy with good biological markers. Tumor size is 1.8 cm. S/p Lumpectomy followed by radiation therapy . Due to hormone receptor status and the tumor size, she will be candidate for endocrine therapy. She is premenopausal. Will start Tamoxifen. Explained benefits and side effects. She agreed. Patient's questions were answered to the best of her satisfaction and she verbalized full understanding and agreement.

## 2022-10-14 NOTE — TELEPHONE ENCOUNTER
AVS from 10/14/22    Start Tamoxifen  RV about 3 months    Rv scheduled for 1/27/23 @ 10:15am    PT was given AVS and appt schedule

## 2022-10-17 ENCOUNTER — HOSPITAL ENCOUNTER (OUTPATIENT)
Dept: PHYSICAL THERAPY | Facility: CLINIC | Age: 47
Setting detail: THERAPIES SERIES
Discharge: HOME | End: 2022-10-17
Payer: COMMERCIAL

## 2022-10-17 PROCEDURE — 97110 THERAPEUTIC EXERCISES: CPT

## 2022-10-17 PROCEDURE — 97140 MANUAL THERAPY 1/> REGIONS: CPT

## 2022-10-17 NOTE — FLOWSHEET NOTE
[] [address]  [address]  [phone]  F: [phone] [] [address]  [facility] 36   [address]  P: [phone]  F: [phone] [x] [facility]  [address]  P: [phone]  F: [phone] [] [address]  P: [phone]  F: [phone] [] [address]  [facility]   [address]   [place]: [phone]  F: [phone]      Physical Therapy Daily Treatment Note    Date:  10/17/2022  Patient Name:  Magaly Mueller    :  1975  MRN: [id number]  Physician: Wilfrid Garza MD                                 Insurance: [employer] 60 visits  Medical Diagnosis: Malignant neoplasm of upper-outer quadrant of left breast in female, ER+     Rehab Codes: C50.412, Z17.0  Onset date: 22                 Next Dr's appt. : 22  Visit# / total visits:   Cancels/No Shows: 0    Subjective:  Pt finished radiation 10/4/22 states skin got really red and blistered, peeled some. Has been wearing sleeve daily. Notes she hasn't noticed and incr fullness or heaviness. Does note incr N&T into arm and soreness in forearm. Purchased a compression bra online and notes its comfortable. Had US of L breast, needs to have MRI 10/26/22. Started tamoxifen Sat and no side effects as of yet. Pain:  [x] Yes  [] No Location: L chest Pain Rating: (0-10 scale) 1/10  Pain altered Tx:  [x] No  [] Yes  Action:  Comments:    Objective:  Modalities:   Precautions: L BrCa, S/p lumpectomy and sentinel LN biopsy 22. No chemo, Tamoxifen 10/15/22.  Radiation 5x4 weeks 22-10/4/22  Manual:Utilized PORi breast protocol to LEFT upper quarter for gentle manual lymphatic drainage, myofascial release and joint mobility to facilitate better function, ROM, tissue mobility and dynamics of lymph system. Exercises:  Exercise Reps/ Time Weight/ Level Comments             Diaphragmatic breathing 5 cycles       Elbow winging 1m       Supine cane flexion 10x  HEP               Bye bye's 3x ea   Flex, abd   Scapular retraction  10x      Post rolls  10x               Wall flexion slides  10x      SL ABD, ER, HAB 10x *    Doorway pec stretch (advanced)  3x30  *  bilat          UE tband              Other:  L shoulder AROM: flex 157° \"tight /pulling\" axilla before Rx, after 161°      Treatment Charges: Mins Units   []  Modalities     [x]  Ther Exercise 10 1   [x]  Manual Therapy 45 3   []  Ther Activities     []  Aquatics     []  Vasocompression     []  Other     Total Treatment time 55        Assessment: [x] Progressing toward goals. Min redness around nipple, axilla and inf breast, mod discoloration, some peeling yet kat at nipple and axilla. Mod fullness and tight throughout axilla and lateral breast, very TTP. Completed PORI breast protocol to L upper quarter, sig tightness and TTP pec, axilla and subscapular muscles. Rev HEP and completed therex as noted in log, added adv door pec stretch w/ fair nate. Emphasized importance of use of sleeve for 30 days after radiation as well as compression bra daily. Encouraged cont 'd hydration, nutrition and moisturizing as well as daily HEP. Will cont 1x weekly for manual and ex progression. [] No change. [] Other:  [x] Patient would continue to benefit from skilled physical therapy services in order to: address the following goals    STG: (to be met in 12 treatments)  ?  Pain: Stabilize L shoulder and chest pain and ensure optimal management of pain during all ADLs ( home, occupation, community and recreation)  2.   ? ROM: Able to sustain AROM of bilat shoulders to be able to position arm for radiation- MET  3.   ? Strength: L shoulder grossly 5/5 and demo good postural and core stability  4.   ? Function: Pt to report incr ease w/ adv ADL's, lifting and reaching OH  5. Independent with Home Exercise Programs as demonstrated by performance with correct form without cues. LTG: (to be met in 18 treatments)  1. Optimize L shoulder functional ROM to improve QOL  2. Improve tissue mobility of chest wall and axilla to allow for more normal motion and function  3. Continue activity to decrease risk factors associated with increased time being sedentary while undergoing chemotherapy, radiation, surgery. 4. Control/mitigate side effects/late effects of Cancer treatment  5. Maintain BFI score for activity tolerance, and UEFS for overall improved function                 Patient goals: to prevent any complications from radiation    Pt. Education:  [x] Yes  [] No  [x] Reviewed Prior HEP/Ed  Method of Education: [x] Verbal  [x] Demo  [x] Written  Comprehension of Education:  [x] Verbalizes understanding. [] Demonstrates understanding. [] Needs review. [] Demonstrates/verbalizes HEP/Ed previously given. Plan: [x] Continue current frequency toward long and short term goals.     [x] Specific Instructions for subsequent treatments: cont w/ manual and progress as nate      Time In: 10:00 am            Time Out: 10:58 am    Electronically signed by:  Pascale Cuevas, PT , MOIRA

## 2022-10-20 ENCOUNTER — APPOINTMENT (OUTPATIENT)
Dept: OCCUPATIONAL THERAPY | Age: 47
End: 2022-10-20
Payer: COMMERCIAL

## 2022-10-26 ENCOUNTER — HOSPITAL ENCOUNTER (OUTPATIENT)
Dept: MRI IMAGING | Age: 47
Discharge: HOME | End: 2022-10-28
Payer: COMMERCIAL

## 2022-10-26 DIAGNOSIS — Z85.3 HISTORY OF LEFT BREAST CANCER: ICD-10-CM

## 2022-10-26 LAB
BUN BLDV-MCNC: 12 MG/DL (ref 6–20)
CREAT SERPL-MCNC: 0.56 MG/DL (ref 0.5–0.9)
GFR SERPL CREATININE-BSD FRML MDRD: >60 ML/MIN/1.73M2

## 2022-10-26 PROCEDURE — 36415 COLL VENOUS BLD VENIPUNCTURE: CPT

## 2022-10-26 PROCEDURE — A9579 GAD-BASE MR CONTRAST NOS,1ML: HCPCS | Performed by: SURGERY

## 2022-10-26 PROCEDURE — 82565 ASSAY OF CREATININE: CPT

## 2022-10-26 PROCEDURE — C8908 MRI W/O FOL W/CONT, BREAST,: HCPCS

## 2022-10-26 PROCEDURE — 2580000003 HC RX 258: Performed by: SURGERY

## 2022-10-26 PROCEDURE — 6360000004 HC RX CONTRAST MEDICATION: Performed by: SURGERY

## 2022-10-26 PROCEDURE — 84520 ASSAY OF UREA NITROGEN: CPT

## 2022-10-26 RX ORDER — 0.9 % SODIUM CHLORIDE 0.9 %
40 INTRAVENOUS SOLUTION INTRAVENOUS ONCE
Status: COMPLETED | OUTPATIENT
Start: 2022-10-26 | End: 2022-10-26

## 2022-10-26 RX ORDER — SODIUM CHLORIDE 0.9 % (FLUSH) 0.9 %
10 SYRINGE (ML) INJECTION 2 TIMES DAILY
Status: DISCONTINUED | OUTPATIENT
Start: 2022-10-26 | End: 2022-10-29 | Stop reason: HOSPADM

## 2022-10-26 RX ADMIN — SODIUM CHLORIDE 40 ML: 9 INJECTION, SOLUTION INTRAVENOUS at 10:29

## 2022-10-26 RX ADMIN — GADOTERIDOL 20 ML: 279.3 INJECTION, SOLUTION INTRAVENOUS at 10:28

## 2022-10-26 RX ADMIN — SODIUM CHLORIDE, PRESERVATIVE FREE 10 ML: 5 INJECTION INTRAVENOUS at 10:28

## 2022-10-27 ENCOUNTER — HOSPITAL ENCOUNTER (OUTPATIENT)
Dept: PHYSICAL THERAPY | Facility: CLINIC | Age: 47
Setting detail: THERAPIES SERIES
Discharge: HOME | End: 2022-10-27
Payer: COMMERCIAL

## 2022-10-27 ENCOUNTER — TELEPHONE (OUTPATIENT)
Dept: ONCOLOGY | Age: 47
End: 2022-10-27

## 2022-10-27 PROCEDURE — 97140 MANUAL THERAPY 1/> REGIONS: CPT

## 2022-10-27 PROCEDURE — 97110 THERAPEUTIC EXERCISES: CPT

## 2022-10-27 NOTE — TELEPHONE ENCOUNTER
Name: Netta Avila  : 1975  MRN: [id number]    Oncology Navigation Follow-Up Note    Contact Type:  Telephone    Notes:Writer spoke with pt for ONN f/u. SHe reports she is tolerating Tamoxifen well. She does have a new onset of acne and some mild body aches. Pt encouraged to use OTC medications to help with this discomfort. Survivorship visit was reviewed with pt and she is interested in this. Writer will coordinate to meet with pt when she comes in for her 1 month post RT visit on 11/10. Navigation will be D/C'd atthis time but pt has writer's contact information if she has any future needs.           Electronically signed by Vivian Oreilly RN on 10/27/2022 at 2:52 PM

## 2022-10-27 NOTE — FLOWSHEET NOTE
[] [address]  [address]  [phone]  F: [phone] [] [address]  [facility]   [address]  P: [phone]  F: [phone] [x] [facility]  Outpatient Rehabilitation &  Therapy  [address] [facility]  P: [phone]  F: [phone] [] [address]  P: [phone]  F: [phone] [] [address]  [facility]   [address]   [place]: [phone]  F: [phone]      Physical Therapy Daily Treatment Note    Date:  10/27/2022  Patient Name:  Martin Song    :  1975  MRN: [id number]  Physician: Greg Hernandes MD                                 Insurance: [employer] 60 visits  Medical Diagnosis: Malignant neoplasm of upper-outer quadrant of left breast in female, ER+     Rehab Codes: C50.412, Z17.0  Onset date: 22                 Next Dr's appt. : 22  Visit# / total visits:   Cancels/No Shows: 0    Subjective:  Pt states skin is healing, has some dry patches and notes soreness side of L breast. Reports incr joint aches all over and constant ache L post shoulder blade. Has been wearing sleeve daily and compression bra, no incr fullness or heaviness. Occ N&T into arm. Had [address] [facility] [facility] and MRI that came back negative. Pain:  [x] Yes  [] No Location: L chest Pain Rating: (0-10 scale) 1/10  Pain altered Tx:  [x] No  [] Yes  Action:  Comments:    Objective:  Modalities:   Precautions: L BrCa, S/p lumpectomy and sentinel LN biopsy 22. No chemo, Tamoxifen 10/15/22. Radiation 5x4 weeks 22-10/4/22* 30 days sleeve  Manual:Utilized PORi breast protocol to LEFT upper quarter for gentle manual lymphatic drainage, myofascial release and joint mobility to facilitate better function, ROM, tissue mobility and dynamics of lymph system. Exercises:  Exercise Reps/ Time Weight/ Level Comments             Diaphragmatic breathing 5 cycles       Elbow winging 1m       Supine cane flexion 10x  HEP               Bye bye's 3x ea   Flex, abd   Scapular retraction  10x      Post rolls  10x               Wall flexion slides  10x      SL ABD, ER, HAB 10x     Doorway pec stretch (advanced)  3x30    bilat          UE tband  *            Other:  L shoulder AROM: flex 158° \"tight /pulling\" axilla before Rx, after 162°      Treatment Charges: Mins Units   []  Modalities     [x]  Ther Exercise 10 1   [x]  Manual Therapy 45 3   []  Ther Activities     []  Aquatics     []  Vasocompression     []  Other     Total Treatment time 55        Assessment: [x] Progressing toward goals. Min dryness, peeling and discoloration in axilla and inf breast. Cont's w/ bruises/discoloration around nipple. Fullness and tight throughout axilla and lateral breast, TTP. Completed PORI breast protocol to L upper quarter, tightness and TTP pec, axilla and subscapular muscles. Rev HEP and completed therex as noted in log w/ good nate and demo. Emphasized importance of use of sleeve for 30 days after radiation as well as compression bra daily. Encouraged cont 'd hydration, nutrition and moisturizing as well as daily HEP. Will cont 1x weekly for manual and ex progression. [] No change. [] Other:  [x] Patient would continue to benefit from skilled physical therapy services in order to: address the following goals    STG: (to be met in 12 treatments)  ? Pain: Stabilize L shoulder and chest pain and ensure optimal management of pain during all ADLs ( home, occupation, community and recreation)  2.   ? ROM: Able to sustain AROM of bilat shoulders to be able to position arm for radiation- MET  3.   ? Strength: L shoulder grossly 5/5 and demo good postural and core stability  4.   ? Function: Pt to report incr ease w/ adv ADL's, lifting and reaching OH  5.    Independent with Home Exercise Programs as demonstrated by performance with correct form without cues. LTG: (to be met in 18 treatments)  1. Optimize L shoulder functional ROM to improve QOL  2. Improve tissue mobility of chest wall and axilla to allow for more normal motion and function  3. Continue activity to decrease risk factors associated with increased time being sedentary while undergoing chemotherapy, radiation, surgery. 4. Control/mitigate side effects/late effects of Cancer treatment  5. Maintain BFI score for activity tolerance, and UEFS for overall improved function                 Patient goals: to prevent any complications from radiation    Pt. Education:  [x] Yes  [] No  [x] Reviewed Prior HEP/Ed  Method of Education: [x] Verbal  [x] Demo  [x] Written  Comprehension of Education:  [x] Verbalizes understanding. [] Demonstrates understanding. [] Needs review. [] Demonstrates/verbalizes HEP/Ed previously given. Plan: [x] Continue current frequency toward long and short term goals.     [x] Specific Instructions for subsequent treatments: cont w/ manual and progress as nate      Time In: 4:00 pm            Time Out: 5:04 pm    Electronically signed by:  Que Morrow, PT , MOIRA

## 2022-10-31 ENCOUNTER — TELEPHONE (OUTPATIENT)
Dept: ONCOLOGY | Age: 47
End: 2022-10-31

## 2022-10-31 NOTE — TELEPHONE ENCOUNTER
Cancer Treatment Summary and  Survivorship Care Plan    Provided by Hayley Jaimes RN on 10/31/22        This Cancer Treatment Summary is a brief record of your cancer treatment. The forms provide a way for a cancer survivor to review and retain information about their cancer, cancer treatment, and follow-up care. The forms are also meant to provide basic information about a survivor's care to future healthcare providers. General Information   Patient name Netta Avila    Patient ID [id number]    Phone [phone] (home)    Date of birth/Age 1975, 56 y.o. Health Care Team and Follow-Ups    Continue all standard non-cancer related health care with your Primary Care Provider. Any new, unusual and/or persistent symptoms should be brought to the attention of your provider. Mercedez Antunez MD,       Coordinating Provider When / How Often? Medical Oncology   Dr Robles Samuel   [facility] [address]  [phone]   1-4x/year x 5 years, then annually per NCCN Guidelines    Next appt:  1/27/2023   Radiation Oncology   Dr [address]  [facility] [phone]     Per Dr Bernhard Ernst Dr Quin Frankel  [facility] 36. [phone]   As needed                IMAGING:       Mammogram  Every 12 months (first mammogram 6-12 months after radiation) per NCCN Guidelines    Last mammogram:5/17/2022    Due: as early as 4/2023- no later an 10/2023   LABS: Labs: Not indicated per NCCN guidelines but may be ordered per clinical judgement. Diagnoses, Treatments and Procedures  Cancer Diagnosis Information    Cancer type/location Malignant neoplasm of upper-outer quadrant of left breast in female, estrogen receptor positive ([facility] [address]    Diagnosis date 5/24/2022   Age at diagnosis 56 y.o.    Stage at diagnosis Cancer Staging  Malignant neoplasm of upper-outer quadrant of left breast in female, estrogen receptor positive [facility])  Staging form: Breast, AJCC 8th Edition  - Clinical stage from 2022: Stage IA (cT1, cN0, cM0, G1, ER+, IN+, HER2-) - Signed by Ruben Mesa MD on 2022     Tumor markers at diagnosis Invasive, well differentiated ductal carcinoma. ER/IN positive, Her2 negative    Surgical procedure/location/dates Past Surgical History:   Procedure Laterality Date    BREAST LUMPECTOMY Left 2022    LEFT BREAST LUMPECTOMY AXILLARY SENTINEL NODE DISSECTION WITH NEOPROBE AND LOCALIZER    BREAST SURGERY      biopsy     SECTION      x2    ENTEROCELE REPAIR  12    HYSTERECTOMY (CERVIX STATUS UNKNOWN)  12    LAPAROSCOPY  13    lysis of adhesions    MASTECTOMY Left 2022    LEFT BREAST LUMPECTOMY AXILLARY SENTINEL NODE DISSECTION WITH NEOPROBE AND LOCALIZER performed by Danny Rolon DO at [address] [facility]      w/ mirena removal    US BREAST NEEDLE BIOPSY LEFT Left 2022    US BREAST NEEDLE BIOPSY LEFT 2022 [place] NEEDLE LOC BREAST ADDL LEFT Left 2022    US GUIDED NEEDLE LOC BREAST ADDL LEFT 2022 [facility]    US GUIDED NEEDLE LOC OF LEFT BREAST Left 2022    US GUIDED NEEDLE LOC OF LEFT BREAST 2022 [facility]        Background Information   Genetic/hereditary risk factor(s) or predisposing conditions Cancer-related family history includes Breast Cancer in her maternal aunt. There is no history of Cancer, Colon Cancer, or Ovarian Cancer. Genetic counseling performed Yes. Genetic testing results Date: 2022, Test Name/Company: [employer] Hereditary Cancer Gene Panel , Results: Negative      Treatment Summary   CHEMOTHERAPY no   Oncotype score: 12 Less than 1% benefit of chemotherapy.        RADIATION yes   Body area treated right breast   Total dose delivered 5256 cGy   Total treatments 20   Dates of treatment Start Date: 2022 Stop Date: 10/4/2022     Ongoing Treatments    Need for ongoing (adjuvant) treatment for cancer   yes    Additional treatment name Planned duration Possible side effects   Tamoxifen  5 years  Hot flashes  Changes in menstruation  Mood changes  Increased triglycerides  Increased risk of stroke  Increased risk of endometrial cancer  Increased risk of blood clots  Osteopenia in premenopausal women   Side Effects, Education and Resources  Potential late- and long-term effects of treatment(s)     Long-term Effects  Start during treatment and persist Late Effects  Start after treatment ends   Surgery Effects   Lack of skin sensitivity  Body image issues  Sexual dysfunction  Numbness  Pain  Limited range of motion  Weakness  Poor cosmetic outcome Lymphedema (swelling in an arm)  Neuropathy (weakness, numbness, pain, often in hands)   Radiation Therapy to the Breast/Chest Wall/Regional Lymph Nodes Effects                Fatigue  Skin sensitivity/pain  Sexual dysfunction  Pain  Pneumonitis (inflammation of lung tissue)  Poor cosmetic outcome  Breast atrophy (decrease in breast volume)/asymmetrical breast volume  Lymphedema  Numbness or weakness of the upper extremity Skin discoloration  Breast may be smaller and firmer than the nonirradiated side (breast asymmetry)  Skin sensitivity/pain  Telangiectasia (spider veins)  Sexual dysfunction  Lymphedema  Shortness of breath (lung pneumonitis or fibrosis)  Cardiovascular disease (e.g., pericardial effusion, pericarditis-inflammation or fluid around the heart)   Numbness or weakness of the upper arm  Second primary cancers (e.g., soft-tissue sarcomas of thorax, shoulder, and pelvis; lung cancer)               Hormonal Therapy Effects   Tamoxifen  Hot flashes  Changes in menstruation  Mood changes  Increased triglycerides Increased risk of stroke  Increased risk of endometrial cancer  Increased risk of blood clots  Osteopenia in premenopausal women     General Psychological Long-term and Late Effects Depression  Distress--multifactorial unpleasant experience of psychological, social, and/or spiritual nature  Worry, anxiety  Fear of recurrence  Fear of pain  End-of-life concerns: death and dying  Changes in sexual function and/or desire  Challenges with body image  Challenges with self-image  Relationship and other social role difficulties  Cuzmdl-fr-ndlz concerns and financial challenges        Current and ongoing toxicity and side-effects of treatment(s) received   Currently no issues. Signs and symptoms of possible recurrence     A Survivorship Care Plan is part of total cancer management and includes a timetable for surveillance and follow-up appointments. During follow-up exams, your doctor checks for any signs of cancer recurrence. You can also report any new signs or symptoms to your doctor. Just because you have certain symptoms doesn't always mean the cancer has come back. Symptoms can be due to other problems that need to be addressed. Make an appointment with your doctor if you notice any persistent signs and symptoms that worry you. When to see a doctor:  A new lump in your breast or irregular area of firmness  Changes to the skin of your breast  Skin inflammation or area of redness  Nipple discharge  One or more painless nodules on or under the skin of your chest wall  A new area of thickening along or near the mastectomy scar  A lump or swelling in the lymph nodes located:   Under your arm   Near your collarbone  In the groove above your collarbone  In your neck  Persistent and worsening pain, such as chest or bone pain  Persistent cough  Difficulty breathing  Loss of appetite  Unplanned weight loss  Yellowing of the skin/eyes  Severe headaches  Seizures        Issues/Concerns   Cancer survivors may experience issues with the areas listed below.   If you have any concerns in these or other areas, please speak with your doctors or nurses to find out how you can get help with them.  Anxiety/depression, Emotional and mental health, Fatigue, Financial advice/assistance, Insurance, Memory/concentration loss, Physical functioning, School/work, Sexual functioning, and Weight changes      Lifestyle/Behaviors   A number of lifestyle/behaviors can affect your ongoing health, including the risk for the cancer coming back or developing another cancer. Discuss these recommendations with your doctor or nurse:    Management of medications, Management of other illnesses, Physical activity, and Sun screen use        [employer]   See handout provided at Survivorship Visit         Survivorship Patient [address] [place] (www.cancer. org/SurvivorshipCenter)  Cancer. Net (www.cancer. net/survivorship)   Cancer Survivors Network (csn.cancer. org)   [employer] (www.livestrong. org)   [facility] (www.survivorship.cancer.gov/springboard)   [employer] (www.canceradvocacy. org)    [address] (www.nccn.org/patients/resources/life_after_cancer/)       Referrals/Additional Comments        This Survivorship Care Plan  was shared with the following providers.    Dr Malin Speak   Dr Maciej Rodriges

## 2022-11-03 ENCOUNTER — HOSPITAL ENCOUNTER (OUTPATIENT)
Dept: PHYSICAL THERAPY | Facility: CLINIC | Age: 47
Setting detail: THERAPIES SERIES
Discharge: HOME | End: 2022-11-03
Payer: COMMERCIAL

## 2022-11-03 PROCEDURE — 97140 MANUAL THERAPY 1/> REGIONS: CPT

## 2022-11-03 PROCEDURE — 97110 THERAPEUTIC EXERCISES: CPT

## 2022-11-03 NOTE — FLOWSHEET NOTE
212    [] [facility]) [facility] &  Therapy  [address]  [phone]  F: [phone] [] [address]  [facility]   [address]  P: [phone]  F: [phone] [x] [place] &  Therapy  [address]  P: [phone]  F: [phone] [] [address]  P: [phone]  F: [phone] [] [address]  [facility]   [address]   [place]: [phone]  F: [phone]      Physical Therapy Daily Treatment Note    Date:  11/3/2022  Patient Name:  Lalit Campuzano    :  1975  MRN: [id number]  Physician: Lora Tierney MD                                 Insurance: [employer] 60 visits  Medical Diagnosis: Malignant neoplasm of upper-outer quadrant of left breast in female, ER+     Rehab Codes: C50.412, Z17.0  Onset date: 22                 Next Dr's appt. : 22  Visit# / total visits:   Cancels/No Shows: 0    Subjective:  Pt states she tweaked her back over the weekend bending over doing laundry,  LB has been stiff the last few days-notes she fell down some stairs ~15 yrs ago and has had flare ups since. Incr tingling in her hand and forearm and chest are sore. Has been wearing sleeve daily, notes looking into additional sleeve   Pain:  [x] Yes  [] No Location: L chest Pain Rating: (0-10 scale) 1/10  Pain altered Tx:  [x] No  [] Yes  Action:  Comments:    Objective:  Modalities:   Precautions: L BrCa, S/p lumpectomy and sentinel LN biopsy 22. No chemo, Tamoxifen 10/15/22.  Radiation 5x4 weeks 22-10/4/22* 30 days sleeve  Manual:Utilized PORi breast protocol to LEFT upper quarter for gentle manual lymphatic drainage, myofascial release and joint mobility to facilitate better function, ROM, tissue mobility and dynamics of lymph system. Exercises:  Exercise Reps/ Time Weight/ Level Comments             Diaphragmatic breathing 5 cycles       Elbow winging 1m       Supine cane flexion 10x  HEP               Bye bye's 3x ea   Flex, abd   Scapular retraction  10x      Post rolls  10x               Wall flexion slides  10x      SL ABD, ER, HAB 10x     Doorway pec stretch (advanced)  3x30    bilat   Wrist flex/ext stretch 3x15     OH flex 10x     Wall angels 10x            UE tband  *                SKTC 3x30     LTR 10x       Other:  L shoulder AROM: flex 160° \"tight /pulling\" axilla before Rx, after 162°      Treatment Charges: Mins Units   []  Modalities     [x]  Ther Exercise 15 1   [x]  Manual Therapy 40 3   []  Ther Activities     []  Aquatics     []  Vasocompression     []  Other     Total Treatment time 55        Assessment: [x] Progressing toward goals. Min dryness, peeling and discoloration around nipple only. Decr fullness and tightness throughout axilla and lateral breast. Completed PORI breast protocol to L upper quarter, min tightness and subscapular muscles. Rev HEP and completed therex as noted in log w/ added lumbar stretches, wrist stretches and OH shoulder ex. Issued HO for HEP. Discussed weaning out of sleeve w/ cont 'd monitoring of lymphedema. Encouraged cont 'd hydration, nutrition and moisturizing as well as daily HEP. Will cont 1x weekly for manual and ex progression. [] No change. [] Other:  [x] Patient would continue to benefit from skilled physical therapy services in order to: address the following goals    STG: (to be met in 12 treatments)  ?  Pain: Stabilize L shoulder and chest pain and ensure optimal management of pain during all ADLs ( home, occupation, community and recreation)  2.   ? ROM: Able to sustain AROM of bilat shoulders to be able to position arm for radiation- MET  3.   ? Strength: L shoulder grossly 5/5 and demo good postural and core stability  4.   ? Function: Pt to report incr ease w/ adv ADL's, lifting and reaching OH  5. Independent with Home Exercise Programs as demonstrated by performance with correct form without cues. LTG: (to be met in 18 treatments)  1. Optimize L shoulder functional ROM to improve QOL  2. Improve tissue mobility of chest wall and axilla to allow for more normal motion and function  3. Continue activity to decrease risk factors associated with increased time being sedentary while undergoing chemotherapy, radiation, surgery. 4. Control/mitigate side effects/late effects of Cancer treatment  5. Maintain BFI score for activity tolerance, and UEFS for overall improved function                 Patient goals: to prevent any complications from radiation    Pt. Education:  [x] Yes  [] No  [x] Reviewed Prior HEP/Ed  Method of Education: [x] Verbal  [x] Demo  [x] Written  Comprehension of Education:  [x] Verbalizes understanding. [] Demonstrates understanding. [] Needs review. [] Demonstrates/verbalizes HEP/Ed previously given. Plan: [x] Continue current frequency toward long and short term goals.     [x] Specific Instructions for subsequent treatments: cont w/ manual and progress as nate      Time In: 9:55 am            Time Out: 11:02 am    Electronically signed by:  Nitish An PT , MOIRA

## 2022-11-08 ENCOUNTER — TELEPHONE (OUTPATIENT)
Dept: RADIATION ONCOLOGY | Age: 47
End: 2022-11-08

## 2022-11-08 ENCOUNTER — HOSPITAL ENCOUNTER (OUTPATIENT)
Dept: PHYSICAL THERAPY | Facility: CLINIC | Age: 47
Setting detail: THERAPIES SERIES
Discharge: HOME | End: 2022-11-08
Payer: COMMERCIAL

## 2022-11-08 PROCEDURE — 97110 THERAPEUTIC EXERCISES: CPT

## 2022-11-08 PROCEDURE — 97140 MANUAL THERAPY 1/> REGIONS: CPT

## 2022-11-08 NOTE — FLOWSHEET NOTE
212    [] [facility]) [facility] &  Therapy  [address]  [phone]  F: [phone] [] [address]  [place] [address]   [address]  P: [phone]  F: [phone] [x] [place] [address] &  Therapy  [address]  P: [phone]  F: [phone] [] [address]  P: [phone]  F: [phone] [] [address]  [facility]   [address]   [place]: [phone]  F: [phone]      Physical Therapy Daily Treatment Note    Date:  2022  Patient Name:  Simon Gallardo    :  1975  MRN: [id number]  Physician: Herlinda Joiner MD                                 Insurance: [employer] 60 visits  Medical Diagnosis: Malignant neoplasm of upper-outer quadrant of left breast in female, ER+     Rehab Codes: C50.412, Z17.0  Onset date: 22                 Next Dr's appt. : 22  Visit# / total visits:   Cancels/No Shows: 0    Subjective:  Pt states her back feels better. Notes her L shoulder blade is painful and achy, \"screaming at me\" at times. Notes cold weather makes pain worse. Cont's w/ tingling in her hand and forearm. Has been slowly weaning out of sleeve. Pain:  [x] Yes  [] No Location: L chest Pain Rating: (0-10 scale) 0-1/10 sore  Pain altered Tx:  [x] No  [] Yes  Action:  Comments:    Objective:  Modalities:   Precautions: L BrCa, S/p lumpectomy and sentinel LN biopsy 22. No chemo, Tamoxifen 10/15/22. Radiation 5x4 weeks 22-10/4/22* 30 days sleeve  Manual:Utilized PORi breast protocol to LEFT upper quarter for gentle manual lymphatic drainage, myofascial release and joint mobility to facilitate better function, ROM, tissue mobility and dynamics of lymph system.    Exercises:  Exercise Reps/ Time Weight/ Level Comments Diaphragmatic breathing 5 cycles       Elbow winging 1m       Supine cane flexion 10x  HEP               Bye bye's 3x ea   Flex, abd   Scapular retraction  10x      Post rolls  10x               Wall flexion slides  10x HEP     SL ABD, ER, HAB 10x     Doorway pec stretch (advanced)  3x30    bilat   Wrist flex/ext stretch 3x15     OH flex 10x     Wall angels 10x            UE tband  *                SKTC 3x30 HEP    LTR 10x HEP      Other:  L shoulder AROM: flex 162° \"tight /pulling\" axilla before Rx, after 165°      Treatment Charges: Mins Units   []  Modalities     [x]  Ther Exercise 15 1   [x]  Manual Therapy 40 3   []  Ther Activities     []  Aquatics     []  Vasocompression     []  Other     Total Treatment time 55        Assessment: [x] Progressing toward goals. Mod dryness, min discoloration around nipple. Decr fullness and tightness throughout axilla and lateral breast. Completed PORI breast protocol to L upper quarter, min tightness pec and subscapular muscles and TTP. Rev HEP and completed therex as noted in log w/ good nate. Discussed cont 'd monitoring of lymphedema w/ use of sleeve and compression bra prn. Encouraged cont 'd hydration, nutrition and moisturizing as well as daily HEP. Will cont bi- weekly for manual and ex progression. [] No change. [] Other:  [x] Patient would continue to benefit from skilled physical therapy services in order to: address the following goals    STG: (to be met in 12 treatments)  ? Pain: Stabilize L shoulder and chest pain and ensure optimal management of pain during all ADLs ( home, occupation, community and recreation)  2.   ? ROM: Able to sustain AROM of bilat shoulders to be able to position arm for radiation- MET  3.   ? Strength: L shoulder grossly 5/5 and demo good postural and core stability  4.   ? Function: Pt to report incr ease w/ adv ADL's, lifting and reaching OH  5.    Independent with Home Exercise Programs as demonstrated by performance with correct form without cues. LTG: (to be met in 18 treatments)  1. Optimize L shoulder functional ROM to improve QOL  2. Improve tissue mobility of chest wall and axilla to allow for more normal motion and function  3. Continue activity to decrease risk factors associated with increased time being sedentary while undergoing chemotherapy, radiation, surgery. 4. Control/mitigate side effects/late effects of Cancer treatment  5. Maintain BFI score for activity tolerance, and UEFS for overall improved function                 Patient goals: to prevent any complications from radiation    Pt. Education:  [x] Yes  [] No  [x] Reviewed Prior HEP/Ed  Method of Education: [x] Verbal  [x] Demo  [x] Written  Comprehension of Education:  [x] Verbalizes understanding. [] Demonstrates understanding. [] Needs review. [] Demonstrates/verbalizes HEP/Ed previously given. Plan: [x] Continue current frequency toward long and short term goals.     [x] Specific Instructions for subsequent treatments: cont w/ manual and progress as nate      Time In: 9:00 am            Time Out: 9:55 am    Electronically signed by:  Robert Blackwood PT , MOIRA

## 2022-11-08 NOTE — TELEPHONE ENCOUNTER
RN received request for refill on Tylenol #3. She states she has bone and shoulder pain that is worse recently with the colder temperatures outside. Also states she has some fluid backing up in her breast that is uncomfortable. She has enough to last her till her follow up appt this Thursday with Dr. Liliana Rosales. Told her RN will update Dr. Liliana Rosales and she can discuss refill on this with him at that time.

## 2022-11-10 ENCOUNTER — TELEPHONE (OUTPATIENT)
Dept: ONCOLOGY | Age: 47
End: 2022-11-10

## 2022-11-10 ENCOUNTER — HOSPITAL ENCOUNTER (OUTPATIENT)
Dept: RADIATION ONCOLOGY | Age: 47
Discharge: HOME | End: 2022-11-10
Payer: COMMERCIAL

## 2022-11-10 VITALS
WEIGHT: 207.2 LBS | HEART RATE: 88 BPM | TEMPERATURE: 97 F | BODY MASS INDEX: 35.57 KG/M2 | DIASTOLIC BLOOD PRESSURE: 101 MMHG | RESPIRATION RATE: 16 BRPM | SYSTOLIC BLOOD PRESSURE: 161 MMHG

## 2022-11-10 DIAGNOSIS — C50.412 MALIGNANT NEOPLASM OF UPPER-OUTER QUADRANT OF LEFT BREAST IN FEMALE, ESTROGEN RECEPTOR POSITIVE (HCC): Primary | ICD-10-CM

## 2022-11-10 DIAGNOSIS — Z17.0 MALIGNANT NEOPLASM OF UPPER-OUTER QUADRANT OF LEFT BREAST IN FEMALE, ESTROGEN RECEPTOR POSITIVE (HCC): Primary | ICD-10-CM

## 2022-11-10 PROCEDURE — 99212 OFFICE O/P EST SF 10 MIN: CPT | Performed by: RADIOLOGY

## 2022-11-10 RX ORDER — ACETAMINOPHEN AND CODEINE PHOSPHATE 300; 30 MG/1; MG/1
1 TABLET ORAL EVERY 8 HOURS PRN
Qty: 42 TABLET | Refills: 0 | Status: SHIPPED | OUTPATIENT
Start: 2022-11-10 | End: 2022-11-24

## 2022-11-10 NOTE — PROGRESS NOTES
April YAMEL Avila  11/10/2022  11:18 AM      Vitals:    11/10/22 1100   BP: (!) 161/101   Pulse: 88   Resp: 16   Temp: 97 °F (36.1 °C)    : Wt Readings from Last 1 Encounters:   11/10/22 207 lb 3.2 oz (94 kg)                Current Outpatient Medications:     tamoxifen (NOLVADEX) 20 MG tablet, Take 1 tablet by mouth daily, Disp: 90 tablet, Rfl: 5    fluconazole (DIFLUCAN) 150 MG tablet, TAKE 1 TABLET BY MOUTH ONCE, REPEAT DOSE IN 7 DAYS (Patient not taking: Reported on 10/14/2022), Disp: 2 tablet, Rfl: 0    betamethasone valerate (VALISONE) 0.1 % cream, Apply topically 2-3 times daily. (Patient not taking: Reported on 11/10/2022), Disp: 45 g, Rfl: 2    docusate sodium (COLACE) 100 MG capsule, Take 1 capsule by mouth 2 times daily (Patient not taking: No sig reported), Disp: 20 capsule, Rfl: 0    tiZANidine (ZANAFLEX) 2 MG capsule, TAKE 1 TABLET BY MOUTH AT BEDTIME AS NEEDED (Patient not taking: No sig reported), Disp: , Rfl:     Insulin Degludec (TRESIBA) 100 UNIT/ML SOLN, Inject 14 Units into the skin daily , Disp: , Rfl:     OZEMPIC, 0.25 OR 0.5 MG/DOSE, 2 MG/1.5ML SOPN, INJECT 0.25MG ONCE WEEKLY AS DIRECTED 30, Disp: , Rfl:     escitalopram (LEXAPRO) 10 MG tablet, TAKE 1 TABLET BY MOUTH TWICE A DAY, Disp: , Rfl:     naproxen (NAPROSYN) 500 MG tablet, TAKE 1 TABLET BY MOUTH EVERY 12 HOURS WITH FOOD OR MILK AS NEEDED FOR 30 DAYS (Patient not taking: No sig reported), Disp: , Rfl:     clotrimazole-betamethasone (LOTRISONE) 1-0.05 % cream, Apply to affected area bid externally x 4 days then daily for 3 days (Patient not taking: No sig reported), Disp: 45 g, Rfl: 1               FALLS RISK SCREEN  Instructions:  Assess the patient and enter the appropriate indicators that are present for fall risk identification. Total the numbers entered and assign a fall risk score from Table 2.  Reassess patient at a minimum every 12 weeks or with status change. Assessment   Date  11/10/2022     1.   Mental Ability: confusion/cognitively impaired 0     2. Elimination Issues: incontinence, frequency 0       3. Ambulatory: use of assistive devices (walker, cane, off-loading devices),        attached to equipment (IV pole, oxygen) 0     4. Sensory Limitations: dizziness, vertigo, impaired vision 0     5. Age less than 65        0     6. Age 72 or greater 0     7. Medication: diuretics, strong analgesics, hypnotics, sedatives,        antihypertensive agents 3   8. Falls:  recent history of falls within the last 3 months (not to include slipping or        tripping) 0   TOTAL 3    If score of 4 or greater was education given? No           TABLE 2   Risk Score Risk Level Plan of Care   0-3 Little or  No Risk 1. Provide assistance as indicated for ambulation activities  2. Reorient confused/cognitively impaired patient  3. Chair/bed in low position, stretcher/bed with siderails up except when performing patient care activities  5. Educate patient/family/caregiver on falls prevention  6.  Reassess in 12 weeks or with any noted change in patient condition which places them at a risk for a fall   4-6 Moderate Risk 1. Provide assistance as indicated for ambulation activities  2. Reorient confused/cognitively impaired patient  3. Chair/bed in low position, stretcher/bed with siderails up except when performing patient care activities  4. Educate patient/family/caregiver on falls prevention     7 or   Higher High Risk 1. Place patient in easily observable treatment room  2. Patient attended at all times by family member or staff  3. Provide assistance as indicated for ambulation activities  4. Reorient confused/cognitively impaired patient  5. Chair/bed in low position, stretcher/bed with siderails up except when performing patient care activities  6. Educate patient/family/caregiver on falls prevention         PLAN: Patient is seen today in follow up.   She has occasional terry aches that has gotten worse since starting her Tamoxifen. States she still gets occasional fluid back up in her left breast that is tender at times. She has occasional left shoulder pain as well. States her fatigue has improved since completing radiation treatments.           Yesenia Leal RN

## 2022-11-10 NOTE — TELEPHONE ENCOUNTER
Writer met with pt for survivorship visit. Survivorship Care Plan/Treatment summary extensively reviewed. Refer to Survivorship Care Plan/Treatment summary for details. Education provided on potential long/late term effects of treatment, signs/symptoms of recurrence to report, follow-up care plan, healthy lifestyle promotion, and available local and national resources. Pt denied questions about treatment summary or any other concerns at this time. Two copies were provided to pt and a copy was mailed to her PCP and OB/GYN.

## 2022-11-15 NOTE — PROGRESS NOTES
[facility]            Radiation Oncology          [address] [facility] [facility] [address]        Claudia Rice: [phone]        F: [phone]       [email or web address]           Date of Service: 11/10/2022     Location:  [address],   [address] [facility], [place]   [phone]       RADIATION ONCOLOGY FOLLOW UP NOTE    Patient ID:   Netta Avila  : 1975   MRN: [id number]    DIAGNOSIS:  Cancer Staging  Malignant neoplasm of upper-outer quadrant of left breast in female, estrogen receptor positive ([facility] [address]  Staging form: Breast, AJCC 8th Edition  - Clinical stage from 2022: Stage IA (cT1, cN0, cM0, G1, ER+, [place]+, HER2-) - Signed by Greg Hernandes MD on 2022    -s/p Lump SLN 22   -oncotype 12  -s/p RT 42. 56Gy +10Gy 10/4/22    INTERVAL HISTORY:   Netta Avila is a 56 y.o. [facility] female with a history of a left breast cancer who completed her adjuvant radiation 1 month ago and is here for a follow up visit. She denies any symptoms with skin irritation, redness, or pain. She notes her lymphedema occasionally recurs but she is massaging it down. She notes her energy is improving. She started tamoxifen and has some joint pain. She otherwise denies any other symptoms of headaches, chest pain, SOB, abdominal pain, N/V/D, weight loss, or bleeding. Patient had a Breast MRI on 10/26/22 that showed no evidence of malignancy with recommendation of follow up in 1 year. MEDICATIONS:    Current Outpatient Medications:     acetaminophen-codeine (TYLENOL/CODEINE #3) 300-30 MG per tablet, Take 1 tablet by mouth every 8 hours as needed for Pain for up to 14 days. , Disp: 42 tablet, Rfl: 0    tamoxifen (NOLVADEX) 20 MG tablet, Take 1 tablet by mouth daily, Disp: 90 tablet, Rfl: 5    fluconazole (DIFLUCAN) 150 MG tablet, TAKE 1 TABLET BY MOUTH ONCE, REPEAT DOSE IN 7 DAYS (Patient not taking: Reported on 10/14/2022), Disp: 2 tablet, Rfl: 0 betamethasone valerate (VALISONE) 0.1 % cream, Apply topically 2-3 times daily. (Patient not taking: Reported on 11/10/2022), Disp: 45 g, Rfl: 2    docusate sodium (COLACE) 100 MG capsule, Take 1 capsule by mouth 2 times daily (Patient not taking: No sig reported), Disp: 20 capsule, Rfl: 0    tiZANidine (ZANAFLEX) 2 MG capsule, TAKE 1 TABLET BY MOUTH AT BEDTIME AS NEEDED (Patient not taking: No sig reported), Disp: , Rfl:     Insulin Degludec (TRESIBA) 100 UNIT/ML SOLN, Inject 14 Units into the skin daily , Disp: , Rfl:     OZEMPIC, 0.25 OR 0.5 MG/DOSE, 2 MG/1.5ML SOPN, INJECT 0.25MG ONCE WEEKLY AS DIRECTED 30, Disp: , Rfl:     escitalopram (LEXAPRO) 10 MG tablet, TAKE 1 TABLET BY MOUTH TWICE A DAY, Disp: , Rfl:     naproxen (NAPROSYN) 500 MG tablet, TAKE 1 TABLET BY MOUTH EVERY 12 HOURS WITH FOOD OR MILK AS NEEDED FOR 30 DAYS (Patient not taking: No sig reported), Disp: , Rfl:     clotrimazole-betamethasone (LOTRISONE) 1-0.05 % cream, Apply to affected area bid externally x 4 days then daily for 3 days (Patient not taking: No sig reported), Disp: 45 g, Rfl: 1    ALLERGIES:  Allergies   Allergen Reactions    Ketorolac Other (See Comments)     Fainted/ vomited    Norco [Hydrocodone-Acetaminophen]      Facial flushing/redness         REVIEW OF SYSTEMS:    A full 14 point review of systems was performed and assessed and found to be negative except as noted above. PHYSICAL EXAMINATION:    CHAPERONE: Family/friend/companieon Present    ECO Asymptomatic    VITAL SIGNS: BP (!) 161/101   Pulse 88   Temp 97 °F (36.1 °C) (Temporal)   Resp 16   Wt 207 lb 3.2 oz (94 kg)   LMP 2011   BMI 35.57 kg/m²   GENERAL:  General appearance is that of a well-nourished, well-developed in no apparent distress. HEENT: Normocephalic, atraumatic, EOMI, moist mucosa, no erythema. NECK:  No adenopathy or a palpable thyroid mass, trachea is midline. LYMPHATICS: No cervical, supraclavicular, or axillary adenopathy.   HEART: Normal rate and regular rhythm, normal heart sounds. LUNGS:  Pulmonary effort normal. Normal breath sounds. ABDOMEN:  Soft, nontender, non distended, and no hepatosplenomegaly. EXTREMITIES:  No edema. No calf tenderness. MSK:  No spinal tenderness. Normal ROM. NEUROLOGICAL: Alert and oriented. Strength and sensation intact bilaterally. No focal deficits. PSYCH: Mood normal, behavior normal.  SKIN: No erythema, no desquamation. BREAST: Deferred     LABS:  WBC   Date Value Ref Range Status   06/22/2022 13.9 (H) 3.5 - 11.3 k/uL Final     Segs Absolute   Date Value Ref Range Status   06/22/2022 10.17 (H) 1.50 - 8.10 k/uL Final     Hemoglobin   Date Value Ref Range Status   06/22/2022 13.4 11.9 - 15.1 g/dL Final     Platelets   Date Value Ref Range Status   06/22/2022 357 138 - 453 k/uL Final     No results found for: , CEA  No results found for: PSA    IMAGING:   10/26/22 MRI Breast   Impression   1. Right breast: No MRI evidence of malignancy. Stable benign changes in   the lateral aspect of the right breast with description above. 2.  Left breast: Postoperative changes. No MRI evidence of residual or   recurrent evidence of malignancy. BIRADS 2         ASSESSMENT AND PLAN:  Netta Avila is a 56 y.o. female with a Cancer Staging  Malignant neoplasm of upper-outer quadrant of left breast in female, estrogen receptor positive ([facility] [address]  Staging form: Breast, AJCC 8th Edition  - Clinical stage from 5/19/2022: Stage IA (cT1, cN0, cM0, G1, ER+, [place]+, HER2-) - Signed by Aleja Ly MD on 5/31/2022  . Patient comes in today for a 1 month follow up visit and is doing well after finishing with recovery in her skin toxicity and fatigue. She had a MRI that shows no evidence of disease. She is on tamoxifen and is tolerating it well with mild symptoms. We recommend she continue working on her lymphedema and PT exercises.  We will prescribe her some Tylenol #3 for her joint pain and recommend she discuss it with Medical Oncology. Patient is recommended to come back in 6 months for another follow up visit and exam, or can call to come see us sooner if symptoms change. Patient was in agreement with my recommendations. All questions were answered to their satisfaction. Patient was advised to contact us anytime should they have any questions or concerns. Electronically signed by Lora Tierney MD on 11/15/2022 at 5:00 PM        Medications Prescribed:   New Prescriptions    ACETAMINOPHEN-CODEINE (TYLENOL/CODEINE #3) 300-30 MG PER TABLET    Take 1 tablet by mouth every 8 hours as needed for Pain for up to 14 days. Orders: No orders of the defined types were placed in this encounter. CC:  Patient Care Team:  Maida Rose MD as PCP - General (Family Medicine)  Gilberto Reyes DO as Consulting Physician (Obstetrics & Gynecology)     Total time spent on this case on the day of encounter is 30 minutes. This time includes combination of one or more of the following - review of necessary tests, review of pertinent medical records from the EMR, performing medically appropriate examination and evaluation, counseling and educating the patient/family/caregiver, ordering necessary medical tests, procedures etc., documenting the clinical information in the electronic medical record, care coordination, referring and communicating with other health care providers and interpretation of results independently. This note is created with the assistance of a speech recognition program.  While intending to generate a document that actually reflects the content of the visit, the document can still have some errors including those of syntax and sound a like substitutions which may escape proof reading. It such instances, actual meaning can be extrapolated by contextual diversion.

## 2022-11-21 ENCOUNTER — HOSPITAL ENCOUNTER (OUTPATIENT)
Dept: PHYSICAL THERAPY | Facility: CLINIC | Age: 47
Setting detail: THERAPIES SERIES
Discharge: HOME | End: 2022-11-21
Payer: COMMERCIAL

## 2022-11-21 PROCEDURE — 97140 MANUAL THERAPY 1/> REGIONS: CPT

## 2022-11-21 PROCEDURE — 97110 THERAPEUTIC EXERCISES: CPT

## 2022-11-21 NOTE — FLOWSHEET NOTE
212    [] [facility]) [facility] &  Therapy  [address]  [phone]  F: [phone] [] [address]  [facility]   [address]  P: [phone]  F: [phone] [x] [address]  Outpatient Rehabilitation &  Therapy  [address]  P: [phone]  F: [phone] [] [address]  P: [phone]  F: [phone] [] [address]  [facility]   [address]   [place]: [phone]  F: [phone]      Physical Therapy Daily Treatment Note    Date:  2022  Patient Name:  Dominic Rodas    :  1975  MRN: [id number]  Physician: Meghann Sepulveda MD                                 Insurance: Terrajoule 60 visits  Medical Diagnosis: Malignant neoplasm of upper-outer quadrant of left breast in female, ER+     Rehab Codes: C50.412, Z17.0  Onset date: 22                 Next Dr's appt. : 22  Visit# / total visits: 10/18  Cancels/No Shows: 0    Subjective:  Pt states she is very tired and worn out, hasn't been sleeping good either. Trying not to go outside, incr pain w/ cold weather. Shoulder blade cont's to ache and notes chest is  but healing. Emma tamoxifen ok, notes some joint aches. Has weaned out of sleeve, cont's w/ occ tingling in L hand. Pain:  [x] Yes  [] No Location: L chest Pain Rating: (0-10 scale) 0-1/10 sore  Pain altered Tx:  [x] No  [] Yes  Action:  Comments:    Objective:  Modalities:   Precautions: L BrCa, S/p lumpectomy and sentinel LN biopsy 22. No chemo, Tamoxifen 10/15/22.  Radiation 5x4 weeks 22-10/4/22* 30 days sleeve  Manual:Utilized PORi breast protocol to LEFT upper quarter for gentle manual lymphatic drainage, myofascial release and joint mobility to facilitate better function, ROM, tissue mobility and dynamics of lymph system. Exercises:  Exercise Reps/ Time Weight/ Level Comments             Diaphragmatic breathing 5 cycles       Elbow winging 1m       Supine cane flexion 10x  HEP               Bye bye's 3x ea   Flex, abd   Scapular retraction  10x      Post rolls  10x               Wall flexion slides  10x HEP     SL ABD, ER, HAB 10x     Doorway pec stretch (advanced)  3x30    bilat   Wrist flex/ext stretch 3x15     OH flex 10x     Wall angels 10x            UE tband  *                SKTC 3x30 HEP    LTR 10x HEP      Other:  L shoulder AROM: flex 160° \"tight /pulling\" axilla before Rx, after 166°      Treatment Charges: Mins Units   []  Modalities     [x]  Ther Exercise 15 1   [x]  Manual Therapy 40 3   []  Ther Activities     []  Aquatics     []  Vasocompression     []  Other     Total Treatment time 55        Assessment: [x] Progressing toward goals. Mod dryness, slight discoloration around nipple, axilla. Decr fullness and tightness throughout axilla and lateral breast. Completed PORI breast protocol to L upper quarter, min tightness pec and subscapular muscles, TTP. Rev HEP and completed therex as noted in log w/ good recall and demo. Will progress strengthening next visit as nate by pt. Discussed cont 'd monitoring of lymphedema w/ use of sleeve and compression bra prn. Encouraged cont 'd hydration, nutrition and moisturizing/massaging as well as daily HEP. Emphasized importance of sleep and rest prn through the day. Will cont bi- weekly for manual and ex progression. [] No change. [x] Other: 11/21/22 BFI 3.5, UEFS 25% deficit  [x] Patient would continue to benefit from skilled physical therapy services in order to: address the following goals    STG: (to be met in 12 treatments)  ?  Pain: Stabilize L shoulder and chest pain and ensure optimal management of pain during all ADLs ( home, occupation, community and recreation)  2.   ? ROM: Able to sustain AROM of bilat shoulders to be able to position arm for radiation- MET  3.   ? Strength: L shoulder grossly 5/5 and demo good postural and core stability  4.   ? Function: Pt to report incr ease w/ adv ADL's, lifting and reaching OH  5. Independent with Home Exercise Programs as demonstrated by performance with correct form without cues. LTG: (to be met in 18 treatments)  1. Optimize L shoulder functional ROM to improve QOL  2. Improve tissue mobility of chest wall and axilla to allow for more normal motion and function  3. Continue activity to decrease risk factors associated with increased time being sedentary while undergoing chemotherapy, radiation, surgery. 4. Control/mitigate side effects/late effects of Cancer treatment  5. Maintain BFI score for activity tolerance, and UEFS for overall improved function                 Patient goals: to prevent any complications from radiation    Pt. Education:  [x] Yes  [] No  [x] Reviewed Prior HEP/Ed  Method of Education: [x] Verbal  [x] Demo  [x] Written  Comprehension of Education:  [x] Verbalizes understanding. [] Demonstrates understanding. [] Needs review. [] Demonstrates/verbalizes HEP/Ed previously given. Plan: [x] Continue current frequency toward long and short term goals.     [x] Specific Instructions for subsequent treatments: cont w/ manual and progress as nate      Time In: 1:30 pm            Time Out: 2:31  pm    Electronically signed by:  Stacia Rogers, PT , MOIRA

## 2022-12-06 ENCOUNTER — HOSPITAL ENCOUNTER (OUTPATIENT)
Dept: PHYSICAL THERAPY | Facility: CLINIC | Age: 47
Setting detail: THERAPIES SERIES
Discharge: HOME | End: 2022-12-06
Payer: COMMERCIAL

## 2022-12-06 PROCEDURE — 97140 MANUAL THERAPY 1/> REGIONS: CPT

## 2022-12-06 PROCEDURE — 97110 THERAPEUTIC EXERCISES: CPT

## 2022-12-06 NOTE — FLOWSHEET NOTE
[] [facility]) - [facility] &  Therapy  [address]  [phone]  F: [phone] [] [address]  [facility] [employer] [address]   [address]  P: [phone]  F: [phone] [x] [place] &  Therapy  [address]  P: [phone]  F: [phone] [] [address]  P: [phone]  F: [phone] [] [address]  [facility]   [address]   [place]: [phone]  F: [phone]      Physical Therapy Daily Treatment Note    Date:  2022  Patient Name:  Marino Flynn    :  1975  MRN: [id number]  Physician: Alejandro Duarte MD                                 Insurance: Patrina Schirmer 60 visits  Medical Diagnosis: Malignant neoplasm of upper-outer quadrant of left breast in female, ER+     Rehab Codes: C50.412, Z17.0  Onset date: 22                 Next Dr's appt. : 22  Visit# / total visits:   Cancels/No Shows: 0    Subjective:  Pt states she has noticed some incr soreness in breast and min fullness in breast, has been wearing compression bra and still weaning out of sleeve as able. Has sozo this fri. Cont's w/ bone pain and notes fatigues comes and goes w/ occ naps in the afternoon. No recent tingling  L hand. Pain:  [x] Yes  [] No Location: L chest Pain Rating: (0-10 scale) 1/10 sore  Pain altered Tx:  [x] No  [] Yes  Action:  Comments:    Objective:  Modalities:   Precautions: L BrCa, S/p lumpectomy and sentinel LN biopsy 22. No chemo, Tamoxifen 10/15/22. Radiation 5x4 weeks 22-10/4/22* 30 days sleeve  Manual:Utilized PORi breast protocol to LEFT upper quarter for gentle manual lymphatic drainage, myofascial release and joint mobility to facilitate better function, ROM, tissue mobility and dynamics of lymph system. Exercises:  Exercise Reps/ Time Weight/ Level Comments             Diaphragmatic breathing 5 cycles       Elbow winging 1m       Supine cane flexion 10x  HEP               Bye bye's 3x ea   Flex, abd   Scapular retraction  10x      Post rolls  10x               Wall flexion slides  10x HEP     SL ABD, ER, HAB 10x     Doorway pec stretch (advanced)  3x30    bilat   Wrist flex/ext stretch 3x15     OH flex 10x     Wall angels 10x            UE tband: ext, row, ER, tricep, bicep 10x ea Lime*                SKTC 3x30 HEP    LTR 10x HEP      Other:  L shoulder AROM: flex 162°     Treatment Charges: Mins Units   []  Modalities     [x]  Ther Exercise 17 1   [x]  Manual Therapy 38 3   []  Ther Activities     []  Aquatics     []  Vasocompression     []  Other     Total Treatment time 55        Assessment: [x] Progressing toward goals. Min dryness, slight discoloration around nipple, axilla, healing well. Decr fullness and tightness throughout axilla and lateral breast. Completed PORI breast protocol to L upper quarter, min tightness pec and subscapular muscles, TTP. Rev HEP and completed therex as noted in log, progressed strengthening w/ added UE tband ex this date. Issued HO for HEP of new ex for home. Emphasized pertinent ex daily as well as importance of sleep/rest prn through the day. Discussed cont 'd monitoring of lymphedema w/ use of sleeve and compression bra prn. Will cont bi- weekly for manual and ex progression. [] No change. [x] Other: 11/21/22 BFI 3.5, UEFS 25% deficit  [x] Patient would continue to benefit from skilled physical therapy services in order to: address the following goals    STG: (to be met in 12 treatments)  ?  Pain: Stabilize L shoulder and chest pain and ensure optimal management of pain during all ADLs ( home, occupation, community and recreation)  2.   ? ROM: Able to sustain AROM of bilat shoulders to be able to position arm for radiation- MET  3.   ? Strength: L shoulder grossly 5/5 and demo good postural and core stability  4.   ? Function: Pt to report incr ease w/ adv ADL's, lifting and reaching OH  5. Independent with Home Exercise Programs as demonstrated by performance with correct form without cues. LTG: (to be met in 18 treatments)  1. Optimize L shoulder functional ROM to improve QOL  2. Improve tissue mobility of chest wall and axilla to allow for more normal motion and function  3. Continue activity to decrease risk factors associated with increased time being sedentary while undergoing chemotherapy, radiation, surgery. 4. Control/mitigate side effects/late effects of Cancer treatment  5. Maintain BFI score for activity tolerance, and UEFS for overall improved function                 Patient goals: to prevent any complications from radiation    Pt. Education:  [x] Yes  [] No  [x] Reviewed Prior HEP/Ed  Method of Education: [x] Verbal  [x] Demo  [x] Written  Comprehension of Education:  [x] Verbalizes understanding. [] Demonstrates understanding. [] Needs review. [] Demonstrates/verbalizes HEP/Ed previously given. Plan: [x] Continue current frequency toward long and short term goals.     [x] Specific Instructions for subsequent treatments: cont w/ manual and progress as nate      Time In: 9:02 am            Time Out: 10:05 am    Electronically signed by:  Julienne Estrada, PT , MOIRA

## 2022-12-19 ENCOUNTER — HOSPITAL ENCOUNTER (OUTPATIENT)
Dept: PHYSICAL THERAPY | Facility: CLINIC | Age: 47
Setting detail: THERAPIES SERIES
Discharge: HOME | End: 2022-12-19
Payer: COMMERCIAL

## 2022-12-19 NOTE — FLOWSHEET NOTE
[] [address]  [address]    [phone]  F: [phone]   [] [address]  [facility] [address]   [address]  P: [phone]  F: [phone]  [x] [place]  [address] [facility]  P: [phone]  F: [phone] [] [address]  P: [phone]  F: [phone]  [] [address]  [address] [facility]   [address]   [place] Bas[place]: [phone]  F: [phone]   [] [place] [address] [facility] [address]  Ravi Basurtomarimar: [phone]   F: [phone]     Physical Therapy Cancel/No Show note    Date: 2022  Patient: Netta Avila  : 1975  MRN: [id number]    Cancels/No Shows to date:     For today's appointment patient:    [x]  Cancelled    [] Rescheduled appointment    [] No-show     Reason given by patient:    [x]  Patient ill-pt called and cx'd    []  Conflicting appointment    [] No transportation      [] Conflict with work    [] No reason given    [] Weather related    [] COVID-19    [] Other:      Comments:        [x] Next appointment was confirmed    Electronically signed by: Pascale Cuevas PT

## 2023-01-05 ENCOUNTER — HOSPITAL ENCOUNTER (OUTPATIENT)
Dept: PHYSICAL THERAPY | Facility: CLINIC | Age: 48
Setting detail: THERAPIES SERIES
Discharge: HOME | End: 2023-01-05
Payer: COMMERCIAL

## 2023-01-05 PROCEDURE — 97110 THERAPEUTIC EXERCISES: CPT

## 2023-01-05 PROCEDURE — 97140 MANUAL THERAPY 1/> REGIONS: CPT

## 2023-01-05 NOTE — FLOWSHEET NOTE
[] [address]  [address]  [phone]  F: [phone] [] [address] [facility] 36   [address]  P: [phone]  F: [phone] [x] [place] &  Therapy  [address] [facility]  P: [phone]  F: [phone] [] [address] [facility] [place]  P: [phone]  F: [phone] [] [address]  [facility]   [address]   [place]: [phone]  F: [phone]      Physical Therapy Daily Treatment Note    Date:  2023  Patient Name:  Marcy Link    :  1975  MRN: [id number]  Physician: Samuel Reyes MD, [facility]                                Insurance: [phone] [address] [facility]. 60 visits  Medical Diagnosis: Malignant neoplasm of upper-outer quadrant of left breast in female, ER+     Rehab Codes: C50.412, Z17.0  Onset date: 22                 Next Dr's appt. : 23  Visit# / total visits:   Cancels/No Shows: 0    Subjective:  Pt states she was sick and has been wiped out kat w/ all the holidays. Having some pain axilla and lateral breast, wasn't able to get ex in much. Incr fullness and swelling, has been wearing compression bra and resumed wearing sleeve. Post shoulder blade pain on and off, worse w/ cold weather. Pain:  [x] Yes  [] No Location: L chest Pain Rating: (0-10 scale) 2/10 sore  Pain altered Tx:  [x] No  [] Yes  Action:  Comments:    Objective:  Modalities:   Precautions: L BrCa, S/p lumpectomy and sentinel LN biopsy 22. No chemo, Tamoxifen 10/15/22. Radiation 5x4 weeks 22-10/4/22  Manual:Utilized PORi breast protocol to LEFT upper quarter for gentle manual lymphatic drainage, myofascial release and joint mobility to facilitate better function, ROM, tissue mobility and dynamics of lymph system. Exercises:  Exercise Reps/ Time Weight/ Level Comments             Diaphragmatic breathing 5 cycles       Elbow winging 1m       Supine cane flexion 10x  HEP               Bye bye's 3x ea   Flex, abd   Scapular retraction  10x      Post rolls  10x               Wall flexion slides  10x HEP     SL ABD, ER, HAB 10x     Doorway pec stretch (advanced)  3x30    bilat   Wrist flex/ext stretch 3x15     OH flex 10x     Wall angels 10x            UE tband: ext, row, ER, tricep, bicep 10x ea Lime*                SKTC 3x30 HEP    LTR 10x HEP      Other:  L shoulder AROM: flex 155° tight before Rx, after 162°    Treatment Charges: Mins Units   []  Modalities     [x]  Ther Exercise 13 1   [x]  Manual Therapy 42 3   []  Ther Activities     []  Aquatics     []  Vasocompression     []  Other     Total Treatment time 55        Assessment: [x] Progressing toward goals. Min dryness, slight discoloration around nipple, axilla, healing well overall. Mod fullness and tightness throughout axilla, lateral breast and upper arm. Completed PORI breast protocol to L upper quarter, tightness pec and subscapular muscles, TTP. Rev HEP and completed therex as noted in log w/ emphasis on pertinent ex for ROM and lymphatics. Educated on incr use of sleeve and compression bra over the next week or so and then gradual weaning out of again as able. Advised to f/u w/ lymphedema prn, notes she would like to get another compression bra as well. Will cont bi- weekly for manual and ex progression as nate. [] No change. [x] Other: 11/21/22 BFI 3.5, UEFS 25% deficit  [x] Patient would continue to benefit from skilled physical therapy services in order to: address the following goals    STG: (to be met in 12 treatments)  ?  Pain: Stabilize L shoulder and chest pain and ensure optimal management of pain during all ADLs ( home, occupation, community and recreation)  2.   ? ROM: Able to sustain AROM of bilat shoulders to be able to position arm for radiation- MET  3.   ? Strength: L shoulder grossly 5/5 and demo good postural and core stability  4.   ? Function: Pt to report incr ease w/ adv ADL's, lifting and reaching OH  5. Independent with Home Exercise Programs as demonstrated by performance with correct form without cues. LTG: (to be met in 18 treatments)  1. Optimize L shoulder functional ROM to improve QOL  2. Improve tissue mobility of chest wall and axilla to allow for more normal motion and function  3. Continue activity to decrease risk factors associated with increased time being sedentary while undergoing chemotherapy, radiation, surgery. 4. Control/mitigate side effects/late effects of Cancer treatment  5. Maintain BFI score for activity tolerance, and UEFS for overall improved function                 Patient goals: to prevent any complications from radiation    Pt. Education:  [x] Yes  [] No  [x] Reviewed Prior HEP/Ed  Method of Education: [x] Verbal  [x] Demo  [x] Written  Comprehension of Education:  [x] Verbalizes understanding. [] Demonstrates understanding. [] Needs review. [] Demonstrates/verbalizes HEP/Ed previously given. Plan: [x] Continue current frequency toward long and short term goals.     [x] Specific Instructions for subsequent treatments: cont w/ manual and progress as nate      Time In: 9:02 am            Time Out: 10:03 am    Electronically signed by:  Adelia Jackman PT , MOIRA

## 2023-01-09 ENCOUNTER — CLINICAL DOCUMENTATION (OUTPATIENT)
Dept: OCCUPATIONAL THERAPY | Age: 48
End: 2023-01-09

## 2023-01-09 NOTE — CARE COORDINATION
[x] [facility]) - [facility] - [facility] &  Therapy  [address]  [phone]  F: [phone] [] [address] [facility] 36   [address]  P: [phone]  F: [phone] [] [address]  [address] [facility]  P: [phone]  F: [phone] [] [address]  [facility]   [address]   [place]: [phone]  F: [phone]           April L Toeppe   1975   [id number]    1/9/2023      Referring Physician: Bc Lake MD       Phone: [phone]                   Fax: [phone]  Insurance: Areta Cost (VG:[id number] ) - visits BMN, no co-pay  Medical Diagnosis: C50.412, Z17.0 (ICD-10-CM) - Malignant neoplasm of upper-outer quadrant of left breast in female, estrogen receptor positive [facility])  Rehab Codes: Mitzy Shirley Z17.0      [place] [address]  [phone]        Electronically signed by Kassie Miller OT on 1/9/2023 at 4:23 PM

## 2023-01-12 ENCOUNTER — HOSPITAL ENCOUNTER (OUTPATIENT)
Dept: PHYSICAL THERAPY | Facility: CLINIC | Age: 48
Setting detail: THERAPIES SERIES
Discharge: HOME | End: 2023-01-12
Payer: COMMERCIAL

## 2023-01-12 PROCEDURE — 97110 THERAPEUTIC EXERCISES: CPT

## 2023-01-12 PROCEDURE — 97140 MANUAL THERAPY 1/> REGIONS: CPT

## 2023-01-12 NOTE — FLOWSHEET NOTE
Addended by: RANI GAYTAN on: 9/3/2021 02:18 PM     Modules accepted: Orders     [] [address]  [address]  [phone]  F: [phone] [] [address]  [facility] 36   [address]  P: [phone]  F: [phone] [x] [place] &  Therapy  [address] [facility]  P: [phone]  F: [phone] [] [address]  P: [phone]  F: [phone] [] [address]  [facility]   [address]   [place]: [phone]  F: [phone]      Physical Therapy Daily Treatment Note    Date:  2023  Patient Name:  Derrick Cook    :  1975  MRN: [id number]  Physician: Omero Jessica MD, [facility]                                Insurance: [phone] [employer]. [facility]. [facility]  Medical Diagnosis: Malignant neoplasm of upper-outer quadrant of left breast in female, ER+     Rehab Codes: C50.412, Z17.0  Onset date: 22                 Next Dr's appt. : 23  Visit# / total visits:   Cancels/No Shows: 0    Subjective:  Pt states she is feeling a little better than last visits less swelling. Has been wearing compression bra and  sleeve. Cont's w/ post shoulder blade pain on and off, worse w/ cold damp weather like today. Notes she ordered a new compression bra through lymphedema therapist, hopeful insurance will cover. Pain:  [x] Yes  [] No Location: L chest Pain Rating: (0-10 scale) . 5/10 sore  Pain altered Tx:  [x] No  [] Yes  Action:  Comments:    Objective:  Modalities:   Precautions: L BrCa, S/p lumpectomy and sentinel LN biopsy 22. No chemo, Tamoxifen 10/15/22. Radiation 5x4 weeks 22-10/4/22  Manual:Utilized PORi breast protocol to LEFT upper quarter for gentle manual lymphatic drainage, myofascial release and joint mobility to facilitate better function, ROM, tissue mobility and dynamics of lymph system. Exercises:  Exercise Reps/ Time Weight/ Level Comments             Diaphragmatic breathing 5 cycles       Elbow winging 1m       Supine cane flexion 10x  HEP               Bye bye's 3x ea   Flex, abd   Scapular retraction  10x      Post rolls  10x               Wall flexion slides  10x HEP     SL ABD, ER, HAB 10x     Doorway pec stretch (advanced)  3x30    bilat   Wrist flex/ext stretch 3x15 HEP    OH flex 10x     Wall angels 10x            UE tband: ext, row, ER, tricep, bicep 10x ea Lime*                SKTC 3x30 HEP    LTR 10x HEP      Other:  L shoulder AROM: flex 159° tight before Rx, after 166°    Treatment Charges: Mins Units   []  Modalities     [x]  Ther Exercise 13 1   [x]  Manual Therapy 42 3   []  Ther Activities     []  Aquatics     []  Vasocompression     []  Other     Total Treatment time 55        Assessment: [x] Progressing toward goals. Decr discoloration around nipple, healing well overall. Decr fullness and tightness throughout axilla, lateral breast and upper arm. Completed PORI breast protocol to L upper quarter, tightness pec and subscapular muscles, TTP. Rev HEP and completed therex as noted in log, resumed door pec stretch and UE tband ex w/ fair nate. Emphasis on pertinent ex daily  and postural awareness. Will cont bi- weekly for manual and ex progression as nate. [] No change. [x] Other: 11/21/22 BFI 3.5, UEFS 25% deficit  [x] Patient would continue to benefit from skilled physical therapy services in order to: address the following goals    STG: (to be met in 12 treatments)  ? Pain: Stabilize L shoulder and chest pain and ensure optimal management of pain during all ADLs ( home, occupation, community and recreation)  2.   ? ROM: Able to sustain AROM of bilat shoulders to be able to position arm for radiation- MET  3.   ? Strength: L shoulder grossly 5/5 and demo good postural and core stability  4.   ? Function: Pt to report incr ease w/ adv ADL's, lifting and reaching OH  5. Independent with Home Exercise Programs as demonstrated by performance with correct form without cues. LTG: (to be met in 18 treatments)  1. Optimize L shoulder functional ROM to improve QOL  2. Improve tissue mobility of chest wall and axilla to allow for more normal motion and function  3. Continue activity to decrease risk factors associated with increased time being sedentary while undergoing chemotherapy, radiation, surgery. 4. Control/mitigate side effects/late effects of Cancer treatment  5. Maintain BFI score for activity tolerance, and UEFS for overall improved function                 Patient goals: to prevent any complications from radiation    Pt. Education:  [x] Yes  [] No  [x] Reviewed Prior HEP/Ed  Method of Education: [x] Verbal  [x] Demo  [x] Written  Comprehension of Education:  [x] Verbalizes understanding. [] Demonstrates understanding. [] Needs review. [] Demonstrates/verbalizes HEP/Ed previously given. Plan: [x] Continue current frequency toward long and short term goals.     [x] Specific Instructions for subsequent treatments: cont w/ manual and progress as nate      Time In: 3:31  pm            Time Out: 4:34  pm    Electronically signed by:  Zach Morrow, PT , MOIRA

## 2023-01-23 RX ORDER — FLUCONAZOLE 150 MG/1
TABLET ORAL
Qty: 2 TABLET | Refills: 0 | Status: SHIPPED | OUTPATIENT
Start: 2023-01-23 | End: 2023-01-27 | Stop reason: ALTCHOICE

## 2023-01-24 ENCOUNTER — HOSPITAL ENCOUNTER (OUTPATIENT)
Dept: PHYSICAL THERAPY | Facility: CLINIC | Age: 48
Setting detail: THERAPIES SERIES
Discharge: HOME | End: 2023-01-24
Payer: COMMERCIAL

## 2023-01-24 PROCEDURE — 97140 MANUAL THERAPY 1/> REGIONS: CPT

## 2023-01-24 PROCEDURE — 97110 THERAPEUTIC EXERCISES: CPT

## 2023-01-24 NOTE — FLOWSHEET NOTE
[] [facility]  [address]  [phone]  F: [phone] [] [address]  [address] [facility]   [address]  P: [phone]  F: [phone] [x] [address]  Outpatient Rehabilitation &  Therapy  [address] [facility]  P: [phone]  F: [phone] [] [address]  P: [phone]  F: [phone] [] [address]  [facility]   [address]   [place]: [phone]  F: [phone]      Physical Therapy Daily Treatment Note    Date:  2023  Patient Name:  Namita Khoury    :  1975  MRN: [id number]  Physician: Raquel Hunter MD, [facility]                                [facility] [phone] [facility]. 60 visits  Medical Diagnosis: Malignant neoplasm of upper-outer quadrant of left breast in female, ER+     Rehab Codes: C50.412, Z17.0  Onset date: 22                 Next Dr's appt. : 23  Visit# / total visits:   Cancels/No Shows: 0    Subjective:  Pt states she has been having incr intermittent LB, sacral, hip and knee pain and achyness. Has been using HP, muscle rub and meds prn w/ some relief. Arm is feeling better, less swelling overall and wearing compression bra and sleeve prn. Cont's w/ post shoulder blade pain on and off, worse w/ cold damp weather. Pain:  [x] Yes  [] No Location: L chest Pain Rating: (0-10 scale) 5/10 LB  Pain altered Tx:  [x] No  [] Yes  Action:  Comments:    Objective:  Modalities:   Precautions: L BrCa, S/p lumpectomy and sentinel LN biopsy 22. No chemo, Tamoxifen 10/15/22. Radiation 5x4 weeks 22-10/4/22  Manual:Utilized PORi breast protocol to LEFT upper quarter for gentle manual lymphatic drainage, myofascial release and joint mobility to facilitate better function, ROM, tissue mobility and dynamics of lymph system. Exercises:  Exercise Reps/ Time Weight/ Level Comments             Diaphragmatic breathing 5 cycles       Elbow winging 1m       Supine cane flexion 10x  HEP               Bye bye's 3x ea   Flex, abd   Scapular retraction  10x      Post rolls  10x               Wall flexion slides  10x HEP     SL ABD, ER, HAB 10x     Doorway pec stretch (advanced)  3x30    bilat   Wrist flex/ext stretch 3x15 HEP    OH flex 10x     Wall angels 10x            UE tband: ext, row, ER, tricep, bicep 10x ea Lime*                SKTC 3x30 HEP    LTR 10x HEP    Piriforms stretch 3x30 HEP    Core stab: TrA contr  *          Standing        Other:  L shoulder AROM: flex 160° tight before Rx, after 165°    Treatment Charges: Mins Units   []  Modalities     [x]  Ther Exercise 20 1   [x]  Manual Therapy 35 3   []  Ther Activities     []  Aquatics     []  Vasocompression     []  Other     Total Treatment time 55        Assessment: [x] Progressing toward goals. Pt noted w/ L post innominate rotation, mod muscular tension lumbar paraspinals and piriformis M. Corrected w/ MET and shotgun maneuver and instructed in SKTC, LTR and piriformis stretch for HEP. Advised to cont w/ HP as well as epsom salt bath and monitor symptoms to see if weather related or from endocrine therapy. Decr discoloration around nipple, healing well overall. Decr fullness and tightness throughout axilla, lateral breast and upper arm. Completed PORI breast protocol to L upper quarter, maintaining ROM well and improved tissue mobility. Rev HEP and completed therex as noted in log. Emphasis on pertinent ex daily  and postural awareness. Will cont bi weekly w/ partial PORI protocol and progress as nate. Will cont bi- weekly for manual and ex progression as nate. [] No change.      [x] Other: 11/21/22 BFI 3.5, UEFS 25% deficit  [x] Patient would continue to benefit from skilled physical therapy services in order to: address the following goals    STG: (to be met in 12 treatments)  ? Pain: Stabilize L shoulder and chest pain and ensure optimal management of pain during all ADLs ( home, occupation, community and recreation) MET  2.   ? ROM: Able to sustain AROM of bilat shoulders to be able to position arm for radiation- MET  3.   ? Strength: L shoulder grossly 5/5 and demo good postural and core stability  4.   ? Function: Pt to report incr ease w/ adv ADL's, lifting and reaching OH- MET  5. Independent with Home Exercise Programs as demonstrated by performance with correct form without cues. LTG: (to be met in 18 treatments)  1. Optimize L shoulder functional ROM to improve QOL  2. Improve tissue mobility of chest wall and axilla to allow for more normal motion and function  3. Continue activity to decrease risk factors associated with increased time being sedentary while undergoing chemotherapy, radiation, surgery. 4. Control/mitigate side effects/late effects of Cancer treatment  5. Maintain BFI score for activity tolerance, and UEFS for overall improved function                 Patient goals: to prevent any complications from radiation    Pt. Education:  [x] Yes  [] No  [x] Reviewed Prior HEP/Ed  Method of Education: [x] Verbal  [x] Demo  [x] Written  Comprehension of Education:  [x] Verbalizes understanding. [] Demonstrates understanding. [] Needs review. [] Demonstrates/verbalizes HEP/Ed previously given. Plan: [x] Continue current frequency toward long and short term goals.     [x] Specific Instructions for subsequent treatments: cont w/ manual and progress as nate      Time In: 10:31  pm            Time Out: 11:34  am    Electronically signed by:  George Villanueva PT , MOIRA

## 2023-01-27 ENCOUNTER — OFFICE VISIT (OUTPATIENT)
Dept: ONCOLOGY | Age: 48
End: 2023-01-27
Payer: COMMERCIAL

## 2023-01-27 ENCOUNTER — TELEPHONE (OUTPATIENT)
Dept: ONCOLOGY | Age: 48
End: 2023-01-27

## 2023-01-27 VITALS
DIASTOLIC BLOOD PRESSURE: 87 MMHG | WEIGHT: 210.1 LBS | BODY MASS INDEX: 36.06 KG/M2 | SYSTOLIC BLOOD PRESSURE: 141 MMHG | HEART RATE: 96 BPM | TEMPERATURE: 97.5 F

## 2023-01-27 DIAGNOSIS — C50.412 MALIGNANT NEOPLASM OF UPPER-OUTER QUADRANT OF LEFT BREAST IN FEMALE, ESTROGEN RECEPTOR POSITIVE (HCC): ICD-10-CM

## 2023-01-27 DIAGNOSIS — Z17.0 MALIGNANT NEOPLASM OF UPPER-OUTER QUADRANT OF LEFT BREAST IN FEMALE, ESTROGEN RECEPTOR POSITIVE (HCC): ICD-10-CM

## 2023-01-27 DIAGNOSIS — C50.412 MALIGNANT NEOPLASM OF UPPER-OUTER QUADRANT OF LEFT BREAST IN FEMALE, ESTROGEN RECEPTOR POSITIVE (HCC): Primary | ICD-10-CM

## 2023-01-27 DIAGNOSIS — Z17.0 MALIGNANT NEOPLASM OF UPPER-OUTER QUADRANT OF LEFT BREAST IN FEMALE, ESTROGEN RECEPTOR POSITIVE (HCC): Primary | ICD-10-CM

## 2023-01-27 PROCEDURE — 99211 OFF/OP EST MAY X REQ PHY/QHP: CPT | Performed by: INTERNAL MEDICINE

## 2023-01-27 PROCEDURE — 99214 OFFICE O/P EST MOD 30 MIN: CPT | Performed by: INTERNAL MEDICINE

## 2023-01-27 RX ORDER — ACETAMINOPHEN AND CODEINE PHOSPHATE 300; 30 MG/1; MG/1
1 TABLET ORAL EVERY 4 HOURS PRN
COMMUNITY

## 2023-01-27 RX ORDER — PYRAZINAMIDE 500 MG/1
1 TABLET ORAL EVERY 8 HOURS PRN
Qty: 42 TABLET | Refills: 0 | Status: SHIPPED | OUTPATIENT
Start: 2023-01-27 | End: 2023-02-10

## 2023-01-27 NOTE — TELEPHONE ENCOUNTER
AVS from 1/27/23      Left diagnostic mammogram in April 2023  RV 3-4 months      *rv is sched for [email or web address]  *pt will self sched eliane 1 week prior to rv    PT was given AVS and appt schedule

## 2023-01-27 NOTE — PROGRESS NOTES
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 ER positive, IA positive, HER2/checo not amplified. Oncotype Dx with low recurrence score and no benefits of chemotherapy. CURRENT THERAPY:         S/p lumpectomy and sentinel LN biopsy 22  S/p radiation therapy completed 2022  Started Tamoxifen 2022. BRIEF CASE HISTORY:       Ms. Yessi Lewis is a very pleasant 56 y.o. female with no major past health problems. . She was doing fine with routine mammogram being normal until this year where she had suspicious abnormalities in the mammogram done on 2022. Diagnostic mammogram 2022 showed area of architectural distortion at the left breast upper outer quadrant. . A biopsy was performed on 2022 and unfortunately that was positive for invasive ductal carcinoma. ER positive, IA positive, HER2/checo not amplified. The patient is seen today for further management of her breast cancer. The patient did very well after her biopsy without any complication. The patient had no symptom preceding her diagnosis of cancer. No chest pain, shortness of breath, cough, sputum or hemoptysis, no back pain or terry aches, no weight loss or decreased appetite, no fever or night sweating. No headaches, and no other complaints. INTERIM HISTORY:   Seen for follow up breast cancer. Started Tamoxifen 2022. She has joints pain. Tolerated well otherwise. No lumps or masses. No other complaints at the present time. GYNECOLOGICAL HISTORY  Menarche at age 16. Hysterectomy age 29, one ovary, endometriosis. +0. Age at first full term pregnancy 33. No use of HRT.     PAST MEDICAL HISTORY: has a past medical history of Anxiety, Carcinoma of upper-outer quadrant of left breast in female, estrogen receptor positive ([facility] [address] Endometriosis, GDM (gestational diabetes mellitus), and H/O hysterectomy for benign disease. PAST SURGICAL HISTORY: has a past surgical history that includes Tubal ligation ();  section; Hysterectomy (13); Enterocele repair (12); laparoscopy (13); US BREAST BIOPSY W LOC DEVICE 1ST LESION LEFT (Left, 2022); US PLACE BREAST LOC DEVICE 1ST LESION LEFT (Left, 2022); US PLACE BREAST LOC DEVICE EACH ADDL LEFT (Left, 2022); Breast surgery; Breast lumpectomy (Left, 2022); and Mastectomy (Left, 2022). CURRENT MEDICATIONS:  has a current medication list which includes the following prescription(s): acetaminophen-codeine, acetaminophen-codeine, tamoxifen, tizanidine, tresiba, ozempic (0.25 or 0.5 mg/dose), and escitalopram.    ALLERGIES:  is allergic to ketorolac and norco [hydrocodone-acetaminophen]. FAMILY HISTORY: aunt 76s breast cancer, g father leukemia. Otherwise negative for any hematological or oncological conditions. SOCIAL HISTORY:  reports that she has never smoked. She has never used smokeless tobacco. She reports that she does not drink alcohol and does not use drugs. REVIEW OF SYSTEMS:     General: No weakness or fatigue. No unanticipated weight loss or decreased appetite. No fever or chills. Eyes: No blurred vision, eye pain or double vision. Ears: No hearing problems or drainage. No tinnitus. Throat: No sore throat, problems with swallowing or dysphagia. Respiratory: No cough, sputum or hemoptysis. No shortness of breath. No pleuritic chest pain. Cardiovascular: No chest pain, orthopnea or PND. No lower extremity edema. No palpitation. Gastrointestinal: No problems with swallowing. No abdominal pain or bloating. No nausea or vomiting. No diarrhea or constipation. No GI bleeding. Genitourinary: No dysuria, hematuria, frequency or urgency.      Musculoskeletal: No muscle aches or pains. No limitation of movement. No back pain. No gait disturbance, No joint complaints. Dermatologic: No skin rashes or pruritus. No skin lesions or discolorations. Psychiatric: No depression, anxiety, or stress or signs of schizophrenia. No change in mood or affect. Hematologic: No history of bleeding tendency. No bruises or ecchymosis. No history of clotting problems. Infectious disease: No fever, chills or frequent infections. Endocrine: No polydipsia or polyuria. No temperature intolerance. Neurologic: No headaches or dizziness. No weakness or numbness of the extremities. No changes in balance, coordination,  memory, mentation, behavior. Allergic/Immunologic: No nasal congestion or hives. No repeated infections. PHYSICAL EXAM:  The patient is not in acute distress. Vital signs: Blood pressure (!) 141/87, pulse 96, temperature 97.5 °F (36.4 °C), temperature source Temporal, weight 210 lb 1.6 oz (95.3 kg), last menstrual period 11/24/2011, not currently breastfeeding. HEENT:  Eyes are normal. Ears, nose and throat are normal.  Neck: Supple. No lymph node enlargement. No thyroid enlargement. Trachea is centrally located. Chest:  Clear to auscultation. No wheezes or crepitations. Breast:  Left breast s/p lumpectomy. No masses. No skin or nippple abnormalities. No palpable lymph nodes. Heart: Regular sinus rhythm. Abdomen: Soft, nontender. No hepatosplenomegaly. No masses. Extremities:  With no edema. Lymph Nodes:  No cervical, axillary or inguinal lymph node enlargement. Neurologic:  Conscious and oriented. No focal neurological deficits. Psychosocial: No depression, anxiety or stress. Skin: No rashes, bruises or ecchymoses.       Review of Diagnostic data:   Lab Results   Component Value Date    WBC 13.9 (H) 06/22/2022    HGB 13.4 06/22/2022    HCT 40.8 06/22/2022    MCV 84.3 06/22/2022     06/22/2022       Chemistry        Component Value Date/Time     (L) 06/22/2022 0940    K 4.6 06/22/2022 0940    CL 97 (L) 06/22/2022 0940    CO2 24 06/22/2022 0940    BUN 12 10/26/2022 1028    CREATININE 0.56 10/26/2022 1028        Component Value Date/Time    CALCIUM 9.3 06/22/2022 0940    ALT 28 07/11/2017 0850          MRI BREAST BILATERAL W WO CONTRAST  Narrative: EXAMINATION:  MRI OF THE BILATERAL BREASTS WITHOUT AND WITH CONTRAST 10/26/2022 10:26   am:    TECHNIQUE:  Multiplanar multisequence MRI of the bilateral breasts were performed   without  and with the administration of intravenous contrast.    Data analysis was performed with color parametric mapping, image   subtraction,  and 3D reconstructions. COMPARISON:  MRI 7 June 2022    HISTORY:  ORDERING SYSTEM PROVIDED HISTORY: History of left breast cancer  TECHNOLOGIST PROVIDED HISTORY:  STAT Creatinine as needed:->Yes  BIRADS 3 reading on repeat US s/p left breast lumpectomy for invasive   ductal  CA  What is the sedation requirement?->None  Reason for Exam: BIRADS 3 reading on repeat US s/p left breast lumpectomy   for  invasive ductal CA, History of left breast cancer. Patient states clip in  place for new lump left breast. HX of CA and Surgery 5/2022 prior   lumpectomy  for breast cancer upper outer quadrant left breast.    FINDINGS:  Breast parenchyma: The breast parenchyma is heterogeneously dense. Mild  background parenchymal contrast enhancement is noted. Right breast: There is no significant change from prior study. Area of   mild  background enhancement at 9 o'clock is unchanged. This was previously   worked  up with ultrasound. Prominent glandular tissue is noted in this area on  prior exam without specific worrisome findings. No abnormality in the   right  axilla or internal mammary chain is noted. Nipple-areolar complex is  unremarkable. Left breast: Postsurgical changes are present upper outer left breast with  signal void consistent with a clip.   No suspicious masslike or   non-masslike  contrast enhancement above background is noted. Tiny anterior enhancing   area  of 4 mm at 1 o'clock 2.4 cm deep to the nipple is unchanged likely a small  lymph node. There is also appearance of a small axillary tail lymph node  inferiorly. No abnormality in the axilla or internal mammary chain is   noted. Nipple-areolar complex demonstrates no acute abnormality. No internal  mammary chain abnormalities are present. Extramammary tissues: No abnormal signal intensity or contrast enhancement   is  noted in the extramammary tissues. Impression: 1. Right breast: No MRI evidence of malignancy. Stable benign changes in  the lateral aspect of the right breast with description above. 2.  Left breast: Postoperative changes. No MRI evidence of residual or  recurrent evidence of malignancy. BIRADS 2    BIRADS:  BIRADS - CATEGORY 2    Benign Findings. Normal interval follow-up is recommended in 12 months. OVERALL ASSESSMENT - BENIGN    Core needle biopsy 5/19/2022 of left breast 2 o'clock position:  Final Diagnosis   LEFT BREAST, 2:00, ULTRASOUND-GUIDED CORE NEEDLE BIOPSIES:   - INVASIVE, WELL DIFFERENTIATED DUCTAL CARCINOMA. - ESTROGEN RECEPTOR POSITIVE. - PROGESTERONE RECEPTOR POSITIVE.   - SEE COMMENT. IMPRESSION:   Stage [id number] Well differentiated invasive ductal carcinoma of the left breast upper outer quadrant. ER positive, OR positive, HER2/checo not amplified. PLAN: I again explained to the patient and her companions the nature of breast cancer, staging, prognosis and treatment. The pathology results were reviewed and explained in layman language. Patient presents with early stage malignancy with good biological markers. Tumor size is 1.8 cm. S/p Lumpectomy followed by radiation therapy . She is premenopausal. Started Tamoxifen. Minimal side effects. Explained benefits and side effects. She agreed to continue. Will have Diagnostic mammogram in April 2023. RV 3 months.   Patient's questions were answered to the best of her satisfaction and she verbalized full understanding and agreement.

## 2023-02-07 ENCOUNTER — HOSPITAL ENCOUNTER (OUTPATIENT)
Dept: PHYSICAL THERAPY | Facility: CLINIC | Age: 48
Setting detail: THERAPIES SERIES
Discharge: HOME | End: 2023-02-07
Payer: COMMERCIAL

## 2023-02-07 PROCEDURE — 97110 THERAPEUTIC EXERCISES: CPT

## 2023-02-07 PROCEDURE — 97140 MANUAL THERAPY 1/> REGIONS: CPT

## 2023-02-07 NOTE — FLOWSHEET NOTE
[] [address]  [address]  [phone]  F: [phone] [] [address]  [facility]   [address]  P: [phone]  F: [phone] [x] [address]  [address] [facility]  P: [phone]  F: [phone] [] [address]  P: [phone]  F: [phone] [] [address]  [facility]   [address]   [place]: [phone]  F: [phone]      Physical Therapy Daily Treatment Note    Date:  2023  Patient Name:  Hung Hanna    :  1975  MRN: [id number]  Physician: Glynn Crowell MD, [facility]                                Insurance: AUTH AFTER EVAL thru AIM portal or call [phone]; vs bomn - rui yr; no copay; ded 1000/ 0 met; coins 20%; oop 4000 / 0 met. APPROVED 13 VISITS 23-23 [id number]  0983 BCBS: Medical Diagnosis: Malignant neoplasm of upper-outer quadrant of left breast in female, ER+     Rehab Codes: C50.412, Z17.0  Onset date: 22                 Next 's appt.: ongoing  Visit# / total visits: 3/12 ()  Cancels/No Shows: 0    Subjective:  Pt states she saw Oncologist, still achy in joints and back but better, feels from Tamoxifen. Notes incr nausea since last week throughout the day also. Arm cont's to feel better, less swelling overall and wearing compression bra and sleeve prn. Cont's w/ intermittent post shoulder blade pain. States HEP going well. Pain:  [x] Yes  [] No Location: L chest Pain Rating: (0-10 scale) 1/10 LB  Pain altered Tx:  [x] No  [] Yes  Action:  Comments:    Objective:  Modalities:   Precautions: L BrCa, S/p lumpectomy and sentinel LN biopsy 22. No chemo, Tamoxifen 10/15/22.  Radiation 5x4 weeks 22-10/4/22  Manual:Utilized PORi breast protocol to LEFT upper quarter for gentle manual lymphatic drainage, myofascial release and joint mobility to facilitate better function, ROM, tissue mobility and dynamics of lymph system. Exercises:  Exercise Reps/ Time Weight/ Level Comments             Diaphragmatic breathing 5 cycles       Elbow winging 1m       Supine cane flexion 10x  HEP               Bye bye's 3x ea   Flex, abd   Scapular retraction  10x      Post rolls  10x               Wall flexion slides  10x HEP     SL ABD, ER, HAB 10x     Doorway pec stretch (advanced)  3x30    bilat   Wrist flex/ext stretch 3x15 HEP    OH flex 10x     Wall angels 10x            UE tband: ext, row, B ER, tricep, bicep 10x ea Lime                SKTC 3x30 HEP    LTR 10x HEP    Piriforms stretch 3x30 HEP    Core stab: TrA contr  **          Standing        Other:  L shoulder AROM: flex 162° tight before Rx, after 165°    Treatment Charges: Mins Units   []  Modalities     [x]  Ther Exercise 20 1   [x]  Manual Therapy 35 3   []  Ther Activities     []  Aquatics     []  Vasocompression     []  Other     Total Treatment time 55        Assessment: [x] Progressing toward goals. Decr discoloration around nipple, healing well overall. Decr fullness and tightness throughout axilla, lateral breast and upper arm. Completed PORI breast protocol to L upper quarter, maintaining ROM well and improved tissue mobility. Rev HEP and completed therex as noted in log. Emphasis on pertinent ex daily  and postural awareness. Will cont bi weekly w/ partial PORI protocol and progress core stab ex as nate. [] No change. [x] Other: 11/21/22 BFI 3.5, UEFS 25% deficit  [x] Patient would continue to benefit from skilled physical therapy services in order to: address the following goals    STG: (to be met in 12 treatments)  ?  Pain: Stabilize L shoulder and chest pain and ensure optimal management of pain during all ADLs ( home, occupation, community and recreation) MET  2.   ? ROM: Able to sustain AROM of bilat shoulders to be able to position arm for radiation- MET  3.   ? Strength: L shoulder grossly 5/5 and demo good postural and core stability  4.   ? Function: Pt to report incr ease w/ adv ADL's, lifting and reaching OH- MET  5. Independent with Home Exercise Programs as demonstrated by performance with correct form without cues. LTG: (to be met in 18-30 treatments)  1. Optimize L shoulder functional ROM to improve QOL  2. Improve tissue mobility of chest wall and axilla to allow for more normal motion and function  3. Continue activity to decrease risk factors associated with increased time being sedentary while undergoing chemotherapy, radiation, surgery. 4. Control/mitigate side effects/late effects of Cancer treatment  5. Maintain BFI score for activity tolerance, and UEFS for overall improved function                 Patient goals: to prevent any complications from radiation    Pt. Education:  [x] Yes  [] No  [x] Reviewed Prior HEP/Ed  Method of Education: [x] Verbal  [x] Demo  [x] Written  Comprehension of Education:  [x] Verbalizes understanding. [] Demonstrates understanding. [] Needs review. [] Demonstrates/verbalizes HEP/Ed previously given. Plan: [x] Continue current frequency toward long and short term goals.     [x] Specific Instructions for subsequent treatments: cont w/ manual and progress as nate      Time In: 8:55  pm            Time Out: 10:01  am    Electronically signed by:  Gricelda Genao, PT , MOIRA

## 2023-02-27 ENCOUNTER — HOSPITAL ENCOUNTER (OUTPATIENT)
Dept: PHYSICAL THERAPY | Facility: CLINIC | Age: 48
Setting detail: THERAPIES SERIES
Discharge: HOME | End: 2023-02-27
Payer: COMMERCIAL

## 2023-02-27 PROCEDURE — 97140 MANUAL THERAPY 1/> REGIONS: CPT

## 2023-02-27 PROCEDURE — 97110 THERAPEUTIC EXERCISES: CPT

## 2023-02-27 NOTE — FLOWSHEET NOTE
[] [address]  [address]  [phone]  F: [phone] [] [address]  [facility]   [address]  P: [phone]  F: [phone] [x] [address]  [address] [facility]  P: [phone]  F: [phone] [] [address]  P: [phone]  F: [phone] [] [address]  [facility]   [address]   [place]: [phone]  F: [phone]      Physical Therapy Daily Treatment Note    Date:  2023  Patient Name:  Chucho Guzman    :  1975  MRN: [id number]  Physician: Warden Gitelman, MD, [facility]                                Insurance: AUTH AFTER EVAL thru AIM portal or call [phone]; vs bomn - rui yr; no copay; ded 1000/ 0 met; coins 20%; oop 4000 / 0 met. APPROVED 13 VISITS 23-23 [id number]  9157 BCBS: Medical Diagnosis: Malignant neoplasm of upper-outer quadrant of left breast in female, ER+     Rehab Codes: C50.412, Z17.0  Onset date: 22                 Next 's appt.: ongoing  Visit# / total visits:  ()  Cancels/No Shows: 0    Subjective:  Pt states she is having good and bad days with joints aches from Tamoxifen, yesterday was a bad day, achy all over today w/ rainy weather. No recent nausea but appetite is off, not vert hungry. Slight fullness in breast/axilla and notes arm has been good but had to use sleeve the other day as noticed some fullness and tingling, has been doing a lot more w/ arms w/ crafts and pottery. Wearing compression bra daily and sleeve prn. Cont's w/ intermittent post shoulder blade pain. States HEP going well.      Pain:  [x] Yes  [] No Location: L chest Pain Rating: (0-10 scale) 2-7/10  annoying ache shoulder blade  Pain altered Tx:  [x] No  [] Yes Action:  Comments:    Objective:  Modalities:   Precautions: L BrCa, S/p lumpectomy and sentinel LN biopsy 7/7/22. No chemo, Tamoxifen 10/15/22. Radiation 5x4 weeks 9/7/22-10/4/22  Manual:Utilized PORi breast protocol to LEFT upper quarter for gentle manual lymphatic drainage, myofascial release and joint mobility to facilitate better function, ROM, tissue mobility and dynamics of lymph system. Exercises:  Exercise Reps/ Time Weight/ Level Comments             Diaphragmatic breathing 5 cycles       Elbow winging 1m       Supine cane flexion 10x  HEP               Bye bye's 3x ea   Flex, abd   Scapular retraction  10x      Post rolls  10x               Wall flexion slides  10x HEP     SL ABD, ER, HAB 10x     Doorway pec stretch (advanced)  3x30    bilat   Wrist flex/ext stretch 3x15 HEP    OH flex 10x     Wall angels 10x            UE tband: ext, row, B ER, tricep, bicep 10x ea Lime NT               SKTC 3x30 HEP    LTR 10x HEP    Piriforms stretch 3x30 HEP    Core stab: TrA contr  **          Standing        Other:  L shoulder AROM: flex 158° pulling under arm/axilla before Rx, after 160°    Treatment Charges: Mins Units   []  Modalities     [x]  Ther Exercise 15 1   [x]  Manual Therapy 40 3   []  Ther Activities     []  Aquatics     []  Vasocompression     []  Other     Total Treatment time 55        Assessment: [x] Progressing toward goals. Slight discoloration around nipple, healing well overall. Mod fullness and tightness throughout axilla, lateral breast and upper arm- discussed importance of pertinent ex daily, use of compression bra daily as well as sleeve for a few days to get fullness down then slowly wean back out. Also discussed modifying activity, rest prn and use of sleeve w/ new or repetitive activity. Completed PORI breast protocol to L upper quarter, mod tightness throughout L upper quarter and TTP. Rev HEP and completed therex as noted in log.  Encouraged cont'd monitoring of lymphedema and utilizing lymphatic massage. Will cont bi weekly w/ partial/ PORI protocol and progress core stab ex as nate. [] No change. [x] Other: 11/21/22 BFI 3.5, UEFS 25% deficit  [x] Patient would continue to benefit from skilled physical therapy services in order to: address the following goals    STG: (to be met in 12 treatments)  ? Pain: Stabilize L shoulder and chest pain and ensure optimal management of pain during all ADLs ( home, occupation, community and recreation) MET  2.   ? ROM: Able to sustain AROM of bilat shoulders to be able to position arm for radiation- MET  3.   ? Strength: L shoulder grossly 5/5 and demo good postural and core stability  4.   ? Function: Pt to report incr ease w/ adv ADL's, lifting and reaching OH- MET  5. Independent with Home Exercise Programs as demonstrated by performance with correct form without cues. LTG: (to be met in 18-30 treatments)  1. Optimize L shoulder functional ROM to improve QOL  2. Improve tissue mobility of chest wall and axilla to allow for more normal motion and function  3. Continue activity to decrease risk factors associated with increased time being sedentary while undergoing chemotherapy, radiation, surgery. 4. Control/mitigate side effects/late effects of Cancer treatment  5. Maintain BFI score for activity tolerance, and UEFS for overall improved function                 Patient goals: to prevent any complications from radiation    Pt. Education:  [x] Yes  [] No  [x] Reviewed Prior HEP/Ed  Method of Education: [x] Verbal  [x] Demo  [x] Written  Comprehension of Education:  [x] Verbalizes understanding. [] Demonstrates understanding. [] Needs review. [] Demonstrates/verbalizes HEP/Ed previously given. Plan: [x] Continue current frequency toward long and short term goals.     [x] Specific Instructions for subsequent treatments: cont w/ manual and progress as nate      Time In: 8:55  pm            Time Out: 10:01  am    Electronically signed by:  Dorys Argueta, PT , MOIRA

## 2023-03-09 ENCOUNTER — CLINICAL DOCUMENTATION (OUTPATIENT)
Dept: OCCUPATIONAL THERAPY | Age: 48
End: 2023-03-09

## 2023-03-09 NOTE — DISCHARGE SUMMARY
TREATMENT LOCATION:   [] [facility]  [address], [address]  P: [phone]  F: [phone] [x] [facility]   [address]: [phone]  F: [phone]     Lymphedema Therapy Discharge Note    Date: 3/9/2023      Patient: Amina Silva  : 1975  MRN: [id number]    Referring Physician: Pavithra Henriquez MD       Phone: [phone]                   Fax: [phone]  Insurance: See [employer] (DV:[id number] ) - visits BMN, no co-pay  Medical Diagnosis: C50.412, Z17.0 (ICD-10-CM) - Malignant neoplasm of upper-outer quadrant of left breast in female, estrogen receptor positive ([facility]  Rehab Codes: I89.0, C50.412, Z17.0        Total visits attended: 2        Cancels/No shows: 0  Date of initial visit: 22              Date of final visit: 22      Subjective:  Refer to initial evaluation/ treatment notes. Objective:  Refer to initial evaluation/treatment notes. Assessment:  Refer to initial evaluation/ treatment notes. Treatment to Date:  [] Therapeutic Exercise           [x] Instruction in Home Program                       [] Manual Therapy  [x] Self-Care/Home Management  [] Vasopneumatic Pump             [] Other:    Discharge Status:   [] Treatment goals were met. [] Pt received maximum benefit. No further therapy indicated at this time. [x] Pt to continue exercise/home instructions independently  [x] Pt has not called or returned to clinic in over 90 days with additional concerns. Comments: Last spoke with pt who reported she was managing well on her own & requested further suggestions on compression bras. Checked benefits via insurance and no coverage at this time as deductible not met. Provided several suggestions for bras that would be covered by insurance (with authorization) and off the shelf options that she could purchase online or at local vendor, such as [employer].  Pt may be seen again at lymphedema clinic in the future with new referral.       Lymphedema Life Impact Scale:  [] Eval not collected as pt seen prior to sx  [x] D/C unable to collect    Brief Fatigue Inventory: [] Eval 1 - mild [x] D/C see PT                  Electronically signed by Beryle Magnuson, OT on 3/9/2023 at 9:34 AM    The patient is being discharged due to the status listed above. If patient would like to return to the clinic for additional therapy a new referral will be necessary. Thank you again for your referral and allowing us to assist in the care of this patient! For any questions or concerns, please don't hesitate to call us at [phone].

## 2023-03-13 ENCOUNTER — OFFICE VISIT (OUTPATIENT)
Dept: ONCOLOGY | Age: 48
End: 2023-03-13
Payer: COMMERCIAL

## 2023-03-13 ENCOUNTER — HOSPITAL ENCOUNTER (OUTPATIENT)
Dept: PHYSICAL THERAPY | Facility: CLINIC | Age: 48
Setting detail: THERAPIES SERIES
Discharge: HOME | End: 2023-03-13
Payer: COMMERCIAL

## 2023-03-13 DIAGNOSIS — Z17.0 MALIGNANT NEOPLASM OF UPPER-OUTER QUADRANT OF LEFT BREAST IN FEMALE, ESTROGEN RECEPTOR POSITIVE (HCC): Primary | ICD-10-CM

## 2023-03-13 DIAGNOSIS — C50.412 MALIGNANT NEOPLASM OF UPPER-OUTER QUADRANT OF LEFT BREAST IN FEMALE, ESTROGEN RECEPTOR POSITIVE (HCC): Primary | ICD-10-CM

## 2023-03-13 PROCEDURE — 97110 THERAPEUTIC EXERCISES: CPT

## 2023-03-13 PROCEDURE — 93702 BIS XTRACELL FLUID ANALYSIS: CPT | Performed by: INTERNAL MEDICINE

## 2023-03-13 PROCEDURE — 99212 OFFICE O/P EST SF 10 MIN: CPT | Performed by: INTERNAL MEDICINE

## 2023-03-13 PROCEDURE — 97140 MANUAL THERAPY 1/> REGIONS: CPT

## 2023-03-13 NOTE — FLOWSHEET NOTE
[] [address]  [address]  [phone]  F: [phone] [] [address]  [facility]   [address]  P: [phone]  F: [phone] [x] [address]  [facility]  P: [phone]  F: [phone] [] [address]  P: [phone]  F: [phone] [] [address]  [facility]   [address]   [place]: [phone]  F: [phone]      Physical Therapy Daily Treatment Note    Date:  3/13/2023  Patient Name:  Quinn Bean    :  1975  MRN: [id number]  Physician: Sudheer Pope MD, [facility]                                Insurance: AUTH AFTER EVAL thru AIM portal or call [phone]; vs bomn - rui yr; no copay; ded 1000/ 0 met; coins 20%; oop 4000 / 0 met. APPROVED 13 VISITS 23-23 [facility]#[id number]  3542 [employer]: Medical Diagnosis: Malignant neoplasm of upper-outer quadrant of left breast in female, ER+     Rehab Codes: C50.412, Z17.0  Onset date: 22                 Next 's appt.: ongoing  Visit# / total visits:  ()  Cancels/No Shows: 0    Subjective:  Pt states she is tired today, didn't sleep good up in pain hip and shoulder blade. Notes shoulder blade is really irritated. Started having some hot flashes but not to bad. Slight fullness in breast/axilla but feels normal, just had sozo and was normal range. Wearing compression bra daily and sleeve prn. States HEP going well. Pain:  [x] Yes  [] No Location: L chest Pain Rating: (0-10 scale) 3/10  annoying ache shoulder blade  Pain altered Tx:  [x] No  [] Yes  Action:  Comments:    Objective:  Modalities:   Precautions: L BrCa, S/p lumpectomy and sentinel LN biopsy 22. No chemo, Tamoxifen 10/15/22.  Radiation 5x4 weeks 9/7/22-10/4/22  Manual:Utilized PORi breast protocol to LEFT upper quarter for gentle manual lymphatic drainage, myofascial release and joint mobility to facilitate better function, ROM, tissue mobility and dynamics of lymph system. Exercises:  Exercise Reps/ Time Weight/ Level Comments             Diaphragmatic breathing 5 cycles       Elbow winging 1m       Supine cane flexion 10x  HEP               Bye bye's 3x ea   Flex, abd   Scapular retraction  10x      Post rolls  10x               Wall flexion slides  10x HEP     SL ABD, ER, HAB 10x  Thoracic rot w/ HAB*   Doorway pec stretch (advanced)  3x30    bilat   Wrist flex/ext stretch 3x15 HEP    OH flex 10x     Wall angels 10x            UE tband: ext, row, B ER, tricep, bicep 10x ea Lime NT               SKTC 3x30 rev    LTR 10x rev    Piriforms stretch 3x30 rev    Core stab: TrA contr 10x **          Standing        Other:  L shoulder AROM: flex 158° pulling under arm/axilla before Rx, after 160°    Treatment Charges: Mins Units   []  Modalities     [x]  Ther Exercise 15 1   [x]  Manual Therapy 40 3   []  Ther Activities     []  Aquatics     []  Vasocompression     []  Other     Total Treatment time 55        Assessment: [x] Progressing toward goals. Slight discoloration around nipple, healing well overall. Mod fullness and tightness throughout axilla, lateral breast and medial breast, min TTP. Completed PORI breast protocol to L upper quarter, mod tightness throughout L upper quarter. Rev HEP and completed therex as noted in log. Rev back and hip stretches 2° c/o tightness and added TrA contr this date. Encouraged cont'd monitoring of lymphedema and utilizing lymphatic massage. Will cont bi weekly w/ partial/ PORI protocol and progress core stab ex as nate. [] No change.      [x] Other: 11/21/22 BFI 3.5, UEFS 25% deficit  [x] Patient would continue to benefit from skilled physical therapy services in order to: address the following goals    STG: (to be met in 12 treatments)  ? Pain: Stabilize L shoulder and chest pain and ensure optimal management of pain during all ADLs ( home, occupation, community and recreation) MET  2.   ? ROM: Able to sustain AROM of bilat shoulders to be able to position arm for radiation- MET  3.   ? Strength: L shoulder grossly 5/5 and demo good postural and core stability  4.   ? Function: Pt to report incr ease w/ adv ADL's, lifting and reaching OH- MET  5. Independent with Home Exercise Programs as demonstrated by performance with correct form without cues. LTG: (to be met in 18-30 treatments)  1. Optimize L shoulder functional ROM to improve QOL  2. Improve tissue mobility of chest wall and axilla to allow for more normal motion and function  3. Continue activity to decrease risk factors associated with increased time being sedentary while undergoing chemotherapy, radiation, surgery. 4. Control/mitigate side effects/late effects of Cancer treatment  5. Maintain BFI score for activity tolerance, and UEFS for overall improved function                 Patient goals: to prevent any complications from radiation    Pt. Education:  [x] Yes  [] No  [x] Reviewed Prior HEP/Ed  Method of Education: [x] Verbal  [x] Demo  [x] Written  Comprehension of Education:  [x] Verbalizes understanding. [] Demonstrates understanding. [] Needs review. [] Demonstrates/verbalizes HEP/Ed previously given. Plan: [x] Continue current frequency toward long and short term goals.     [x] Specific Instructions for subsequent treatments: cont w/ manual and progress as nate      Time In: 10:01  pm            Time Out: 11:00  am    Electronically signed by:  Antelmo Rogers PT , MOIRA

## 2023-03-13 NOTE — PROGRESS NOTES
Patient:  Netta Avila   MRN:  [id number]   PCP:  Chaya Syed MD   Oncologist:  Dr. Macy Fairchild    Date of test:  3/13/2023   Tested Body Part:  Left Arm  Testing Stage:  Surveillance    SOZO Lymphedema Assessment:       SOZO Measurement Left:  0.4    SOZO Measurement Right:            You will be re-tested following cancer treatment:  Every 3 months for the first 3 years following treatment  Every 6 months during years 4 and 5 following treatment  Annually thereafter    Currently you are following the 3 Months follow up treatment.   Next Scheduled visit:  6/12/2023

## 2023-04-04 ENCOUNTER — HOSPITAL ENCOUNTER (OUTPATIENT)
Dept: PHYSICAL THERAPY | Facility: CLINIC | Age: 48
Setting detail: THERAPIES SERIES
Discharge: HOME | End: 2023-04-04
Payer: COMMERCIAL

## 2023-04-04 PROCEDURE — 97110 THERAPEUTIC EXERCISES: CPT

## 2023-04-04 PROCEDURE — 97140 MANUAL THERAPY 1/> REGIONS: CPT

## 2023-04-04 NOTE — FLOWSHEET NOTE
breast protocol to LEFT upper quarter for gentle manual lymphatic drainage, myofascial release and joint mobility to facilitate better function, ROM, tissue mobility and dynamics of lymph system. Exercises:  Exercise Reps/ Time Weight/ Level Comments             Diaphragmatic breathing 5 cycles       Elbow winging 1m       Supine cane flexion 10x  HEP               Bye bye's 3x ea   Flex, abd   Scapular retraction  10x      Post rolls  10x               Wall flexion slides  10x HEP     SL ABD, ER, HAB 10x  Thoracic rot w/ HAB*   Doorway pec stretch (advanced)  3x30    bilat   Wrist flex/ext stretch 3x15 HEP    OH flex 10x     Wall angels 10x            UE tband: ext, row, B ER, tricep, bicep 10x ea Lime NT               SKTC 3x30 rev    LTR 10x rev    Piriforms stretch 3x30 rev    Core stab: TrA contr, march 10x ** Added march 4/4/23   bridges      Standing        Other:  L shoulder AROM: flex 160° pulling under arm/axilla before Rx, after 160°    Treatment Charges: Mins Units   []  Modalities     [x]  Ther Exercise 15 1   [x]  Manual Therapy 40 3   []  Ther Activities     []  Aquatics     []  Vasocompression     []  Other     Total Treatment time 55        Assessment: [x] Progressing toward goals. Mild fullness and tightness throughout axilla, lateral breast and medial breast, min TTP. Completed PORI breast protocol to L upper quarter, mod tightness throughout L upper quarter. Rev HEP and completed therex as noted in log, able to progress core stab ex w/ good nate. Mod cues for slow controlled comfortable ROM. Encouraged cont'd monitoring of lymphedema and utilizing lymphatic massage and compression prn. Will cont bi weekly w/ partial  PORI protocol and progress core stab/strenghtening ex as nate. [] No change.      [x] Other: 11/21/22 BFI 3.5, UEFS 25% deficit  [x] Patient would continue to benefit from skilled physical therapy services in order to: address the following goals    STG: (to be met in 12

## 2023-04-18 ENCOUNTER — HOSPITAL ENCOUNTER (OUTPATIENT)
Dept: PHYSICAL THERAPY | Facility: CLINIC | Age: 48
Setting detail: THERAPIES SERIES
Discharge: HOME | End: 2023-04-18
Payer: COMMERCIAL

## 2023-04-18 PROCEDURE — 97530 THERAPEUTIC ACTIVITIES: CPT

## 2023-04-18 PROCEDURE — 97140 MANUAL THERAPY 1/> REGIONS: CPT

## 2023-04-18 PROCEDURE — 97110 THERAPEUTIC EXERCISES: CPT

## 2023-04-18 NOTE — FLOWSHEET NOTE
blade  Pain altered Tx:  [x] No  [] Yes  Action:  Comments:    Objective:  Modalities:   Precautions: L BrCa, S/p lumpectomy and sentinel LN biopsy 7/7/22. No chemo, Tamoxifen 10/15/22. Radiation 5x4 weeks 9/7/22-10/4/22  Manual:Utilized PORi breast protocol to LEFT upper quarter for gentle manual lymphatic drainage, myofascial release and joint mobility to facilitate better function, ROM, tissue mobility and dynamics of lymph system. Exercises:  Exercise Reps/ Time Weight/ Level Comments             Diaphragmatic breathing 5 cycles       Elbow winging 1m       Supine cane flexion 10x  HEP               Bye bye's 3x ea   Flex, abd   Scapular retraction  10x      Post rolls  10x               Wall flexion slides  10x HEP     SL ABD, ER, HAB 10x  Thoracic rot w/ HAB*   Doorway pec stretch (advanced)  3x30    bilat   Wrist flex/ext stretch 3x15 HEP    OH flex 10x     Wall angels 10x            UE tband: ext, row, B ER, tricep, bicep 10x ea Lime                SKTC 3x30 rev    LTR 10x rev    Piriforms stretch 3x30 rev    Core stab: TrA contr, march 10x ** Added march 4/4/23   bridges      Standing        Other:  L shoulder AROM: flex 162°    Treatment Charges: Mins Units   []  Modalities     [x]  Ther Exercise 25 2   [x]  Manual Therapy 20 1   [x]  Ther Activities 10 1   []  Aquatics     []  Vasocompression     []  Other     Total Treatment time 55        Assessment: [x] Progressing toward goals. No fullness noted today and improved ROM and tissue mobility noted. Slight tightness at end range, no pain. Completed partial PORI breast protocol to L upper quarter. Rev all HEP and   completed therex as noted in log, able to progress core stab ex w/ good nate. Encouraged cont'd monitoring of lymphedema and utilizing lymphatic massage and compression prn. Will discharge at this time, pt has HO' and tbands and comfortable w/ HEP. [] No change. [x] Other: 11/21/22 BFI 3.5, UEFS 25% deficit.  4/18/23 BFI 4/18/23 1.3,

## 2023-04-19 ENCOUNTER — HOSPITAL ENCOUNTER (OUTPATIENT)
Dept: WOMENS IMAGING | Age: 48
Discharge: HOME | End: 2023-04-21
Payer: COMMERCIAL

## 2023-04-19 DIAGNOSIS — Z17.0 MALIGNANT NEOPLASM OF UPPER-OUTER QUADRANT OF LEFT BREAST IN FEMALE, ESTROGEN RECEPTOR POSITIVE (HCC): ICD-10-CM

## 2023-04-19 DIAGNOSIS — C50.412 MALIGNANT NEOPLASM OF UPPER-OUTER QUADRANT OF LEFT BREAST IN FEMALE, ESTROGEN RECEPTOR POSITIVE (HCC): ICD-10-CM

## 2023-04-19 PROCEDURE — G0279 TOMOSYNTHESIS, MAMMO: HCPCS

## 2023-04-26 ENCOUNTER — OFFICE VISIT (OUTPATIENT)
Dept: OBGYN CLINIC | Age: 48
End: 2023-04-26
Payer: COMMERCIAL

## 2023-04-26 VITALS
SYSTOLIC BLOOD PRESSURE: 140 MMHG | HEIGHT: 64 IN | DIASTOLIC BLOOD PRESSURE: 84 MMHG | WEIGHT: 205 LBS | BODY MASS INDEX: 35 KG/M2

## 2023-04-26 DIAGNOSIS — N89.8 VAGINAL MASS: ICD-10-CM

## 2023-04-26 DIAGNOSIS — Z01.419 WELL FEMALE EXAM WITH ROUTINE GYNECOLOGICAL EXAM: Primary | ICD-10-CM

## 2023-04-26 PROCEDURE — 99396 PREV VISIT EST AGE 40-64: CPT | Performed by: NURSE PRACTITIONER

## 2023-04-26 RX ORDER — PEN NEEDLE, DIABETIC 32GX 5/32"
NEEDLE, DISPOSABLE MISCELLANEOUS
COMMUNITY
Start: 2023-03-31

## 2023-04-26 RX ORDER — LOSARTAN POTASSIUM 50 MG/1
TABLET ORAL
COMMUNITY
Start: 2023-04-25

## 2023-04-26 RX ORDER — SEMAGLUTIDE 1.34 MG/ML
INJECTION, SOLUTION SUBCUTANEOUS
COMMUNITY
Start: 2023-03-23

## 2023-04-26 RX ORDER — ROSUVASTATIN CALCIUM 5 MG/1
5 TABLET, COATED ORAL DAILY
COMMUNITY
Start: 2023-04-18

## 2023-04-26 ASSESSMENT — PATIENT HEALTH QUESTIONNAIRE - PHQ9
SUM OF ALL RESPONSES TO PHQ QUESTIONS 1-9: 0
SUM OF ALL RESPONSES TO PHQ QUESTIONS 1-9: 0
SUM OF ALL RESPONSES TO PHQ9 QUESTIONS 1 & 2: 0
SUM OF ALL RESPONSES TO PHQ QUESTIONS 1-9: 0
2. FEELING DOWN, DEPRESSED OR HOPELESS: 0
SUM OF ALL RESPONSES TO PHQ QUESTIONS 1-9: 0
1. LITTLE INTEREST OR PLEASURE IN DOING THINGS: 0

## 2023-04-26 NOTE — PROGRESS NOTES
History and Physical  [address], [address], [place]. [phone]   Fax [phone]  April YAMEL Avila  2023              53 y.o. Chief Complaint   Patient presents with    Annual Exam       Patient's last menstrual period was 2011. Primary Care Physician: Connor Jiménez MD    The patient was seen and examined. She has no chief complaint today and is here for her annual exam.  Her bowels are regular. There are no voiding complaints. She denies any bloating. She denies vaginal discharge and was counseled on STD's and the need for barrier contraception. HPI : Netta Beckwith is a 53 y.o. female     Annual exam  Diagnosed with left breast cancer 2022- lumpectomy followed by radiation therapy (finished radiation in 2022)  Currently on Tamoxifen daily for 5 years. Completed genetic testing  - negative  Hysterectomy for endometriosis - still has right ovary. Denies hx of cervical dysplasia. Denies vaginal bleeding. Requesting pap smear today.    ________________________________________________________________________  OB History    Para Term  AB Living   3 2 2 0 0 2   SAB IAB Ectopic Molar Multiple Live Births   0 0 0 0 0 2      # Outcome Date GA Lbr Glenn/2nd Weight Sex Delivery Anes PTL Lv   3                Birth Comments: System Generated. Please review and update pregnancy details.    2 Term    8 lb 8 oz (3.856 kg) M CS-Unspec   NORMA   1 Term    8 lb 4 oz (3.742 kg) M CS-Unspec   NORMA     Past Medical History:   Diagnosis Date    Anxiety     Breast cancer ([address]     Carcinoma of upper-outer quadrant of left breast in female, estrogen receptor positive ([address] 2022    Endometriosis     GDM (gestational diabetes mellitus)     H/O hysterectomy for benign disease     History of therapeutic radiation                                                                    Past Surgical

## 2023-05-05 DIAGNOSIS — C50.412 MALIGNANT NEOPLASM OF UPPER-OUTER QUADRANT OF LEFT FEMALE BREAST (HCC): ICD-10-CM

## 2023-05-08 ENCOUNTER — OFFICE VISIT (OUTPATIENT)
Dept: ONCOLOGY | Age: 48
End: 2023-05-08
Payer: COMMERCIAL

## 2023-05-08 ENCOUNTER — TELEPHONE (OUTPATIENT)
Dept: ONCOLOGY | Age: 48
End: 2023-05-08

## 2023-05-08 VITALS
TEMPERATURE: 98.5 F | BODY MASS INDEX: 35.19 KG/M2 | HEART RATE: 86 BPM | WEIGHT: 205 LBS | RESPIRATION RATE: 16 BRPM | SYSTOLIC BLOOD PRESSURE: 131 MMHG | DIASTOLIC BLOOD PRESSURE: 87 MMHG

## 2023-05-08 DIAGNOSIS — C50.412 MALIGNANT NEOPLASM OF UPPER-OUTER QUADRANT OF LEFT BREAST IN FEMALE, ESTROGEN RECEPTOR POSITIVE (HCC): Primary | ICD-10-CM

## 2023-05-08 DIAGNOSIS — Z17.0 MALIGNANT NEOPLASM OF UPPER-OUTER QUADRANT OF LEFT BREAST IN FEMALE, ESTROGEN RECEPTOR POSITIVE (HCC): Primary | ICD-10-CM

## 2023-05-08 PROCEDURE — 99214 OFFICE O/P EST MOD 30 MIN: CPT | Performed by: INTERNAL MEDICINE

## 2023-05-08 PROCEDURE — 99211 OFF/OP EST MAY X REQ PHY/QHP: CPT | Performed by: INTERNAL MEDICINE

## 2023-05-08 RX ORDER — CYCLOBENZAPRINE HCL 10 MG
10 TABLET ORAL 3 TIMES DAILY PRN
Qty: 60 TABLET | Refills: 2 | Status: SHIPPED | OUTPATIENT
Start: 2023-05-08 | End: 2023-05-18

## 2023-05-08 RX ORDER — IBUPROFEN 200 MG
200 TABLET ORAL EVERY 6 HOURS PRN
COMMUNITY

## 2023-05-08 RX ORDER — PYRAZINAMIDE 500 MG/1
TABLET ORAL
Qty: 42 TABLET | Refills: 0 | Status: SHIPPED | OUTPATIENT
Start: 2023-05-08 | End: 2023-06-07

## 2023-05-08 RX ORDER — ACETAMINOPHEN 325 MG/1
650 TABLET ORAL EVERY 6 HOURS PRN
COMMUNITY

## 2023-05-08 NOTE — TELEPHONE ENCOUNTER
AVS from 5/8/23      Left diagnostic mammogram in October 2023  RV 6 months      Pt to schedule  mamm    Rv sched for 11/6 @ 10:00 am    Pt was given AVS and appointment schedule    Electronically signed by Lady Olmos on 5/8/2023 at 1:05 PM

## 2023-05-08 NOTE — PROGRESS NOTES
dysuria, hematuria, frequency or urgency. Musculoskeletal: No muscle aches or pains. No limitation of movement. No back pain. No gait disturbance, No joint complaints. Dermatologic: No skin rashes or pruritus. No skin lesions or discolorations. Psychiatric: No depression, anxiety, or stress or signs of schizophrenia. No change in mood or affect. Hematologic: No history of bleeding tendency. No bruises or ecchymosis. No history of clotting problems. Infectious disease: No fever, chills or frequent infections. Endocrine: No polydipsia or polyuria. No temperature intolerance. Neurologic: No headaches or dizziness. No weakness or numbness of the extremities. No changes in balance, coordination,  memory, mentation, behavior. Allergic/Immunologic: No nasal congestion or hives. No repeated infections. PHYSICAL EXAM:  The patient is not in acute distress. Vital signs: Blood pressure 131/87, pulse 86, temperature 98.5 °F (36.9 °C), temperature source Oral, resp. rate 16, weight 205 lb (93 kg), last menstrual period 11/24/2011, not currently breastfeeding. HEENT:  Eyes are normal. Ears, nose and throat are normal.  Neck: Supple. No lymph node enlargement. No thyroid enlargement. Trachea is centrally located. Chest:  Clear to auscultation. No wheezes or crepitations. Breast:  Left breast s/p lumpectomy. No masses. No skin or nippple abnormalities. No palpable lymph nodes. Heart: Regular sinus rhythm. Abdomen: Soft, nontender. No hepatosplenomegaly. No masses. Extremities:  With no edema. Lymph Nodes:  No cervical, axillary or inguinal lymph node enlargement. Neurologic:  Conscious and oriented. No focal neurological deficits. Psychosocial: No depression, anxiety or stress. Skin: No rashes, bruises or ecchymoses.       Review of Diagnostic data:   Lab Results   Component Value Date    WBC 13.9 (H) 06/22/2022    HGB 13.4 06/22/2022    HCT 40.8 06/22/2022    MCV 84.3 06/22/2022

## 2023-05-11 ENCOUNTER — HOSPITAL ENCOUNTER (OUTPATIENT)
Dept: RADIATION ONCOLOGY | Age: 48
Discharge: HOME | End: 2023-05-11
Payer: COMMERCIAL

## 2023-05-11 VITALS
BODY MASS INDEX: 34.57 KG/M2 | SYSTOLIC BLOOD PRESSURE: 141 MMHG | OXYGEN SATURATION: 85 % | TEMPERATURE: 98 F | DIASTOLIC BLOOD PRESSURE: 82 MMHG | HEART RATE: 85 BPM | WEIGHT: 201.4 LBS | RESPIRATION RATE: 16 BRPM

## 2023-05-11 PROCEDURE — 99212 OFFICE O/P EST SF 10 MIN: CPT | Performed by: RADIOLOGY

## 2023-05-11 NOTE — PROGRESS NOTES
April YAMEL Avila  5/11/2023  11:02 AM      Vitals:    05/11/23 1054   BP: (!) 141/82   Pulse: 85   Resp: 16   Temp: 98 °F (36.7 °C)   SpO2: (!) 85%    :     Pain Assessment: None - Denies Pain          Wt Readings from Last 1 Encounters:   05/11/23 201 lb 6.4 oz (91.4 kg)                Current Outpatient Medications:     acetaminophen-codeine (TYLENOL #3) 300-30 MG per tablet, TAKE 1 TABLET BY MOUTH EVERY 8 HOURS AS NEEDED FOR PAIN FOR UP TO 14 DAYS, Disp: 42 tablet, Rfl: 0    ibuprofen (ADVIL;MOTRIN) 200 MG tablet, Take 1 tablet by mouth every 6 hours as needed for Pain, Disp: , Rfl:     acetaminophen (TYLENOL) 325 MG tablet, Take 2 tablets by mouth every 6 hours as needed for Pain, Disp: , Rfl:     cyclobenzaprine (FLEXERIL) 10 MG tablet, Take 1 tablet by mouth 3 times daily as needed for Muscle spasms, Disp: 60 tablet, Rfl: 2    BD PEN NEEDLE DOLORES 2ND GEN 32G X 4 MM MISC, USE AS DIRECTED FOUR TIMES DAILY (INSURANCE IS ONLY PAYING FOR 3 TIMES DAILY), Disp: , Rfl:     losartan (COZAAR) 50 MG tablet, , Disp: , Rfl:     rosuvastatin (CRESTOR) 5 MG tablet, Take 1 tablet by mouth daily, Disp: , Rfl:     OZEMPIC, 1 MG/DOSE, 4 MG/3ML SOPN, INJECT 1 MG SUBCUTANEOUSLY WEEKLY, Disp: , Rfl:     tamoxifen (NOLVADEX) 20 MG tablet, Take 1 tablet by mouth daily, Disp: 90 tablet, Rfl: 5    tiZANidine (ZANAFLEX) 2 MG capsule, TAKE 1 TABLET BY MOUTH AT BEDTIME AS NEEDED (Patient not taking: Reported on 5/8/2023), Disp: , Rfl:     Insulin Degludec (TRESIBA) 100 UNIT/ML SOLN, Inject 22 Units into the skin daily, Disp: , Rfl:                FALLS RISK SCREEN  Instructions:  Assess the patient and enter the appropriate indicators that are present for fall risk identification. Total the numbers entered and assign a fall risk score from Table 2.  Reassess patient at a minimum every 12 weeks or with status change. Assessment   Date  5/11/2023     1. Mental Ability: confusion/cognitively impaired 0     2.   Elimination Issues:

## 2023-05-15 ENCOUNTER — PROCEDURE VISIT (OUTPATIENT)
Dept: OBGYN CLINIC | Age: 48
End: 2023-05-15
Payer: COMMERCIAL

## 2023-05-15 DIAGNOSIS — N89.8 VAGINAL MASS: ICD-10-CM

## 2023-05-15 PROCEDURE — 99999 PR OFFICE/OUTPT VISIT,PROCEDURE ONLY: CPT | Performed by: OBSTETRICS & GYNECOLOGY

## 2023-05-15 PROCEDURE — 76830 TRANSVAGINAL US NON-OB: CPT | Performed by: OBSTETRICS & GYNECOLOGY

## 2023-05-24 ENCOUNTER — TELEMEDICINE (OUTPATIENT)
Dept: OBGYN CLINIC | Age: 48
End: 2023-05-24
Payer: COMMERCIAL

## 2023-05-24 DIAGNOSIS — Z90.710 S/P HYSTERECTOMY: ICD-10-CM

## 2023-05-24 DIAGNOSIS — Z01.419 WELL FEMALE EXAM WITH ROUTINE GYNECOLOGICAL EXAM: ICD-10-CM

## 2023-05-24 DIAGNOSIS — Z12.31 ENCOUNTER FOR SCREENING MAMMOGRAM FOR MALIGNANT NEOPLASM OF BREAST: ICD-10-CM

## 2023-05-24 DIAGNOSIS — N89.8 VAGINAL MASS: Primary | ICD-10-CM

## 2023-05-24 PROCEDURE — 99213 OFFICE O/P EST LOW 20 MIN: CPT | Performed by: OBSTETRICS & GYNECOLOGY

## 2023-06-01 RX ORDER — ONDANSETRON 4 MG/1
4 TABLET, FILM COATED ORAL 3 TIMES DAILY PRN
Qty: 90 TABLET | Refills: 1 | Status: SHIPPED | OUTPATIENT
Start: 2023-06-01 | End: 2023-07-01

## 2023-06-01 RX ORDER — ONDANSETRON 4 MG/1
TABLET, FILM COATED ORAL
Qty: 10 TABLET | Refills: 0 | OUTPATIENT
Start: 2023-06-01

## 2023-06-01 NOTE — PROGRESS NOTES
Netta Avila  2023 YAMEL Margarita (:  1975) has requested an audio/video evaluation for the following concern(s):    1. Vaginal mass    2. Encounter for screening mammogram for malignant neoplasm of breast    3. S/P hysterectomy          TELEHEALTH EVALUATION -- Audio/Visual (During UOLII-78 public health emergency)      Netta Ceja is a 53 y.o. female [id number]      She was here to follow-up regarding her labs and diagnostics ordered at her last visit for the diagnosis of:    ICD-10-CM    1. Vaginal mass  N89.8       2. Encounter for screening mammogram for malignant neoplasm of breast  Z12.31       3. S/P hysterectomy  Z90.710           She does not have any specific chief complaint today. Her bowels are regular and she is voiding without difficulty. Pt denies any vaginal pain/ bleeding. Pt has follow up with oncology for breast cancer.  Sono normal. Pt had genetic testing which was negative      Past Medical History:   Diagnosis Date    Anxiety     Breast cancer ([address]     Carcinoma of upper-outer quadrant of left breast in female, estrogen receptor positive ([address] 2022    Endometriosis     GDM (gestational diabetes mellitus)     H/O hysterectomy for benign disease     History of therapeutic radiation          Past Surgical History:   Procedure Laterality Date    BREAST LUMPECTOMY Left 2022    LEFT BREAST LUMPECTOMY AXILLARY SENTINEL NODE DISSECTION WITH NEOPROBE AND LOCALIZER    BREAST SURGERY      biopsy     SECTION      x2    ENTEROCELE REPAIR  12    HYSTERECTOMY (CERVIX STATUS UNKNOWN)  12    LAPAROSCOPY  13    lysis of adhesions    MASTECTOMY Left 2022    LEFT BREAST LUMPECTOMY AXILLARY SENTINEL NODE DISSECTION WITH NEOPROBE AND LOCALIZER performed by Julio Sr DO at [facility],[address]      w/ mirena removal    US BREAST BIOPSY W LOC DEVICE 1ST LESION LEFT Left 2022    US BREAST NEEDLE BIOPSY LEFT 2022 Tim Gatica

## 2023-07-10 DIAGNOSIS — C50.412 MALIGNANT NEOPLASM OF UPPER-OUTER QUADRANT OF LEFT FEMALE BREAST (HCC): ICD-10-CM

## 2023-07-11 RX ORDER — ACETAMINOPHEN AND CODEINE PHOSPHATE 300; 30 MG/1; MG/1
TABLET ORAL
Qty: 42 TABLET | Refills: 0 | Status: SHIPPED | OUTPATIENT
Start: 2023-07-11 | End: 2023-08-10

## 2023-08-07 RX ORDER — ONDANSETRON 4 MG/1
4 TABLET, FILM COATED ORAL 3 TIMES DAILY PRN
Qty: 90 TABLET | Refills: 1 | Status: SHIPPED | OUTPATIENT
Start: 2023-08-07 | End: 2023-09-06

## 2023-08-09 RX ORDER — CYCLOBENZAPRINE HCL 10 MG
10 TABLET ORAL 3 TIMES DAILY PRN
Qty: 60 TABLET | Refills: 2 | Status: SHIPPED | OUTPATIENT
Start: 2023-08-09 | End: 2023-09-08

## 2023-09-18 ENCOUNTER — OFFICE VISIT (OUTPATIENT)
Dept: ONCOLOGY | Age: 48
End: 2023-09-18
Payer: COMMERCIAL

## 2023-09-18 DIAGNOSIS — C50.412 MALIGNANT NEOPLASM OF UPPER-OUTER QUADRANT OF LEFT BREAST IN FEMALE, ESTROGEN RECEPTOR POSITIVE (HCC): Primary | ICD-10-CM

## 2023-09-18 DIAGNOSIS — Z17.0 MALIGNANT NEOPLASM OF UPPER-OUTER QUADRANT OF LEFT BREAST IN FEMALE, ESTROGEN RECEPTOR POSITIVE (HCC): Primary | ICD-10-CM

## 2023-09-18 PROCEDURE — 99212 OFFICE O/P EST SF 10 MIN: CPT | Performed by: INTERNAL MEDICINE

## 2023-09-18 PROCEDURE — 93702 BIS XTRACELL FLUID ANALYSIS: CPT | Performed by: INTERNAL MEDICINE

## 2023-09-18 NOTE — PROGRESS NOTES
Patient:  April YAMEL Avila   MRN:  [id number]   PCP:  Carlos Duran MD   Oncologist:  Dr. Tamanna Dunbar    Date of test:  9/18/2023   Tested Body Part:  Right Arm  Testing Stage:  Surveillance    SOZO Lymphedema Assessment:  Patient not having any swelling or discomfort. Patient did complain about hot flashes and night sweats that are worse. I did speak to Dr. Tamanna Dunbar, he is calling in Effoxer 75mg in to her pharmacy for the patient. Will make sure the patient knows that the med is being called in. SOZO Measurement Left:  -2.0    SOZO Measurement Right:            You will be re-tested following cancer treatment:  Every 3 months for the first 3 years following treatment  Every 6 months during years 4 and 5 following treatment  Annually thereafter    Currently you are following the 3 Months follow up treatment.   Next Scheduled visit:  12/18/2023

## 2023-09-18 NOTE — TELEPHONE ENCOUNTER
Per MA, pt c/o hot flashes and night sweats. Dr. Melanie Antunez states ok to route him RX for effexor 75mg qhs. RX routed as requested.

## 2023-09-20 RX ORDER — VENLAFAXINE HYDROCHLORIDE 75 MG/1
75 CAPSULE, EXTENDED RELEASE ORAL NIGHTLY
Qty: 30 CAPSULE | Refills: 0 | Status: SHIPPED | OUTPATIENT
Start: 2023-09-20 | End: 2023-10-20

## 2023-09-28 PROBLEM — G62.9 NEUROPATHY: Status: ACTIVE | Noted: 2023-07-21

## 2023-09-28 PROBLEM — I10 PRIMARY HYPERTENSION: Status: ACTIVE | Noted: 2023-07-21

## 2023-09-28 PROBLEM — C50.919 MALIGNANT NEOPLASM OF BREAST (HCC): Status: ACTIVE | Noted: 2023-07-21

## 2023-09-28 PROBLEM — E11.9 TYPE 2 DIABETES MELLITUS (HCC): Status: ACTIVE | Noted: 2023-09-28

## 2023-09-28 PROBLEM — K58.9 IRRITABLE BOWEL SYNDROME: Status: ACTIVE | Noted: 2023-07-21

## 2023-09-28 PROBLEM — Z90.710 ACQUIRED ABSENCE OF BOTH CERVIX AND UTERUS: Status: ACTIVE | Noted: 2023-07-21

## 2023-09-28 PROBLEM — H60.90 OTITIS EXTERNA: Status: ACTIVE | Noted: 2023-07-21

## 2023-09-28 PROBLEM — G56.03 BILATERAL CARPAL TUNNEL SYNDROME: Status: ACTIVE | Noted: 2023-07-21

## 2023-09-28 RX ORDER — BISACODYL 5 MG
TABLET, DELAYED RELEASE (ENTERIC COATED) ORAL
COMMUNITY
Start: 2023-08-24

## 2023-09-28 RX ORDER — POLYETHYLENE GLYCOL 3350 17 G/17G
POWDER, FOR SOLUTION ORAL
COMMUNITY
Start: 2023-08-24

## 2023-09-30 SDOH — ECONOMIC STABILITY: INCOME INSECURITY: HOW HARD IS IT FOR YOU TO PAY FOR THE VERY BASICS LIKE FOOD, HOUSING, MEDICAL CARE, AND HEATING?: NOT HARD AT ALL

## 2023-09-30 SDOH — ECONOMIC STABILITY: TRANSPORTATION INSECURITY
IN THE PAST 12 MONTHS, HAS LACK OF TRANSPORTATION KEPT YOU FROM MEETINGS, WORK, OR FROM GETTING THINGS NEEDED FOR DAILY LIVING?: NO

## 2023-09-30 SDOH — ECONOMIC STABILITY: HOUSING INSECURITY
IN THE LAST 12 MONTHS, WAS THERE A TIME WHEN YOU DID NOT HAVE A STEADY PLACE TO SLEEP OR SLEPT IN A SHELTER (INCLUDING NOW)?: NO

## 2023-09-30 SDOH — ECONOMIC STABILITY: FOOD INSECURITY: WITHIN THE PAST 12 MONTHS, YOU WORRIED THAT YOUR FOOD WOULD RUN OUT BEFORE YOU GOT MONEY TO BUY MORE.: NEVER TRUE

## 2023-09-30 SDOH — ECONOMIC STABILITY: FOOD INSECURITY: WITHIN THE PAST 12 MONTHS, THE FOOD YOU BOUGHT JUST DIDN'T LAST AND YOU DIDN'T HAVE MONEY TO GET MORE.: NEVER TRUE

## 2023-10-03 ENCOUNTER — OFFICE VISIT (OUTPATIENT)
Dept: OBGYN CLINIC | Age: 48
End: 2023-10-03
Payer: COMMERCIAL

## 2023-10-03 VITALS
BODY MASS INDEX: 33.8 KG/M2 | DIASTOLIC BLOOD PRESSURE: 84 MMHG | SYSTOLIC BLOOD PRESSURE: 126 MMHG | HEIGHT: 64 IN | WEIGHT: 198 LBS

## 2023-10-03 DIAGNOSIS — Z90.710 H/O HYSTERECTOMY FOR BENIGN DISEASE: ICD-10-CM

## 2023-10-03 DIAGNOSIS — C50.412 MALIGNANT NEOPLASM OF UPPER-OUTER QUADRANT OF LEFT FEMALE BREAST, UNSPECIFIED ESTROGEN RECEPTOR STATUS (HCC): ICD-10-CM

## 2023-10-03 DIAGNOSIS — N89.8 VAGINAL MASS: Primary | ICD-10-CM

## 2023-10-03 PROCEDURE — 3079F DIAST BP 80-89 MM HG: CPT | Performed by: OBSTETRICS & GYNECOLOGY

## 2023-10-03 PROCEDURE — 3074F SYST BP LT 130 MM HG: CPT | Performed by: OBSTETRICS & GYNECOLOGY

## 2023-10-03 PROCEDURE — 99213 OFFICE O/P EST LOW 20 MIN: CPT | Performed by: OBSTETRICS & GYNECOLOGY

## 2023-10-03 RX ORDER — CYCLOBENZAPRINE HCL 10 MG
TABLET ORAL
COMMUNITY
Start: 2023-10-01

## 2023-10-03 NOTE — PROGRESS NOTES
Netta Galiciafernandoyoli  10/3/2023    YOB: 1975          HPI:  Netta Najera is a 53 y.o. female  Pt presents to office for follow up on possible vaginal mass. Pt s/p hysterectomy . Pt was diagnosed with breast cancer 1 year ago and is on tamoxifen. Pt had an exam done in office and noted a possible mass on vaginal cuff. Pt denies vaginal bleeding/ spotting/ pain. Pt denies pain with intercourse. OB History    Para Term  AB Living   3 2 2 0 0 2   SAB IAB Ectopic Molar Multiple Live Births   0 0 0 0 0 2      # Outcome Date GA Lbr Glenn/2nd Weight Sex Delivery Anes PTL Lv   3                Birth Comments: System Generated. Please review and update pregnancy details.    2 Term    8 lb 8 oz (3.856 kg) M CS-Unspec   NORMA   1 Term    8 lb 4 oz (3.742 kg) M CS-Unspec   NORMA       Past Medical History:   Diagnosis Date    Anxiety     Bilateral carpal tunnel syndrome 2023    Breast cancer ([address])     Carcinoma of upper-outer quadrant of left breast in female, estrogen receptor positive ([address]) 2022    Endometriosis     GDM (gestational diabetes mellitus)     H/O hysterectomy for benign disease     History of therapeutic radiation     Irritable bowel syndrome 2023    Primary hypertension 2023    Type 2 diabetes mellitus ([address]) 2023       Past Surgical History:   Procedure Laterality Date    BREAST LUMPECTOMY Left 2022    LEFT BREAST LUMPECTOMY AXILLARY SENTINEL NODE DISSECTION WITH NEOPROBE AND LOCALIZER    BREAST SURGERY      biopsy     SECTION      x2    ENTEROCELE REPAIR  12    HYSTERECTOMY (CERVIX STATUS UNKNOWN)  12    LAPAROSCOPY  13    lysis of adhesions    MASTECTOMY Left 2022    LEFT BREAST LUMPECTOMY AXILLARY SENTINEL NODE DISSECTION WITH NEOPROBE AND LOCALIZER performed by Elsa Holland DO at [address]      w/ mirena removal    US BREAST BIOPSY W LOC DEVICE 1ST LESION LEFT Left 2022

## 2023-10-10 ENCOUNTER — HOSPITAL ENCOUNTER (OUTPATIENT)
Dept: WOMENS IMAGING | Age: 48
Discharge: HOME | End: 2023-10-12
Payer: COMMERCIAL

## 2023-10-10 ENCOUNTER — HOSPITAL ENCOUNTER (OUTPATIENT)
Dept: WOMENS IMAGING | Age: 48
End: 2023-10-10
Payer: COMMERCIAL

## 2023-10-10 DIAGNOSIS — C50.412 MALIGNANT NEOPLASM OF UPPER-OUTER QUADRANT OF LEFT BREAST IN FEMALE, ESTROGEN RECEPTOR POSITIVE (HCC): ICD-10-CM

## 2023-10-10 DIAGNOSIS — Z17.0 MALIGNANT NEOPLASM OF UPPER-OUTER QUADRANT OF LEFT BREAST IN FEMALE, ESTROGEN RECEPTOR POSITIVE (HCC): ICD-10-CM

## 2023-10-10 PROCEDURE — G0279 TOMOSYNTHESIS, MAMMO: HCPCS

## 2023-10-11 RX ORDER — VENLAFAXINE HYDROCHLORIDE 75 MG/1
75 CAPSULE, EXTENDED RELEASE ORAL NIGHTLY
Qty: 90 CAPSULE | Refills: 0 | Status: SHIPPED | OUTPATIENT
Start: 2023-10-11 | End: 2024-01-09

## 2023-10-18 RX ORDER — TAMOXIFEN CITRATE 20 MG/1
20 TABLET ORAL DAILY
Qty: 90 TABLET | Refills: 5 | Status: SHIPPED | OUTPATIENT
Start: 2023-10-18

## 2023-11-06 ENCOUNTER — TELEPHONE (OUTPATIENT)
Dept: ONCOLOGY | Age: 48
End: 2023-11-06

## 2023-11-06 ENCOUNTER — OFFICE VISIT (OUTPATIENT)
Dept: ONCOLOGY | Age: 48
End: 2023-11-06
Payer: COMMERCIAL

## 2023-11-06 VITALS
WEIGHT: 195.2 LBS | RESPIRATION RATE: 18 BRPM | TEMPERATURE: 98.5 F | HEART RATE: 94 BPM | SYSTOLIC BLOOD PRESSURE: 135 MMHG | BODY MASS INDEX: 33.51 KG/M2 | DIASTOLIC BLOOD PRESSURE: 85 MMHG | OXYGEN SATURATION: 99 %

## 2023-11-06 DIAGNOSIS — Z17.0 MALIGNANT NEOPLASM OF UPPER-OUTER QUADRANT OF LEFT BREAST IN FEMALE, ESTROGEN RECEPTOR POSITIVE (HCC): Primary | ICD-10-CM

## 2023-11-06 DIAGNOSIS — C50.412 MALIGNANT NEOPLASM OF UPPER-OUTER QUADRANT OF LEFT BREAST IN FEMALE, ESTROGEN RECEPTOR POSITIVE (HCC): Primary | ICD-10-CM

## 2023-11-06 PROCEDURE — 99214 OFFICE O/P EST MOD 30 MIN: CPT | Performed by: INTERNAL MEDICINE

## 2023-11-06 PROCEDURE — 3075F SYST BP GE 130 - 139MM HG: CPT | Performed by: INTERNAL MEDICINE

## 2023-11-06 PROCEDURE — 99211 OFF/OP EST MAY X REQ PHY/QHP: CPT | Performed by: INTERNAL MEDICINE

## 2023-11-06 PROCEDURE — 3079F DIAST BP 80-89 MM HG: CPT | Performed by: INTERNAL MEDICINE

## 2023-11-06 RX ORDER — TAMOXIFEN CITRATE 20 MG/1
20 TABLET ORAL DAILY
Qty: 90 TABLET | Refills: 5 | Status: SHIPPED | OUTPATIENT
Start: 2023-11-06

## 2023-11-06 NOTE — PROGRESS NOTES
quadrant of left breast in female, estrogen receptor positive ([address]), Endometriosis, GDM (gestational diabetes mellitus), H/O hysterectomy for benign disease, History of therapeutic radiation, Irritable bowel syndrome, Primary hypertension, and Type 2 diabetes mellitus ([address] PAST SURGICAL HISTORY: has a past surgical history that includes Tubal ligation ();  section; Hysterectomy (12); Enterocele repair (12); laparoscopy (13); US BREAST BIOPSY W LOC DEVICE 1ST LESION LEFT (Left, 2022); US PLACE BREAST LOC DEVICE 1ST LESION LEFT (Left, 2022); US PLACE BREAST LOC DEVICE EACH ADDL LEFT (Left, 2022); Breast surgery; Breast lumpectomy (Left, 2022); and Mastectomy (Left, 2022). CURRENT MEDICATIONS:  has a current medication list which includes the following prescription(s): tamoxifen, cyclobenzaprine, ibuprofen, acetaminophen, bd pen needle dana 2nd gen, losartan, rosuvastatin, ozempic (1 mg/dose), tresiba, bisacodyl, and polyethylene glycol. ALLERGIES:  is allergic to ketorolac and norco [hydrocodone-acetaminophen]. FAMILY HISTORY: aunt 76s breast cancer, g father leukemia. Otherwise negative for any hematological or oncological conditions. SOCIAL HISTORY:  reports that she has never smoked. She has never used smokeless tobacco. She reports that she does not drink alcohol and does not use drugs. REVIEW OF SYSTEMS:     General: No weakness or fatigue. No unanticipated weight loss or decreased appetite. No fever or chills. Eyes: No blurred vision, eye pain or double vision. Ears: No hearing problems or drainage. No tinnitus. Throat: No sore throat, problems with swallowing or dysphagia. Respiratory: No cough, sputum or hemoptysis. No shortness of breath. No pleuritic chest pain. Cardiovascular: No chest pain, orthopnea or PND. No lower extremity edema. No palpitation. Gastrointestinal: No problems with swallowing.  No abdominal pain or

## 2023-11-06 NOTE — TELEPHONE ENCOUNTER
AVS From 11/6/23    Bilateral diagnostic mammogram before RV  RV 6 months      Pt to schedule  mamm    Rv sched for 5/6 @ 1045    Pt elected to access avs & schedule on [employer]    Electronically signed by Jody Barriga on 11/6/2023 at 10:48 AM

## 2023-11-07 DIAGNOSIS — C50.412 MALIGNANT NEOPLASM OF UPPER-OUTER QUADRANT OF LEFT FEMALE BREAST (HCC): ICD-10-CM

## 2023-11-07 RX ORDER — ACETAMINOPHEN AND CODEINE PHOSPHATE 300; 30 MG/1; MG/1
1 TABLET ORAL EVERY 8 HOURS PRN
Qty: 42 TABLET | Refills: 0 | Status: CANCELLED | OUTPATIENT
Start: 2023-11-07 | End: 2023-11-21

## 2023-11-07 RX ORDER — ACETAMINOPHEN AND CODEINE PHOSPHATE 300; 30 MG/1; MG/1
TABLET ORAL
Qty: 42 TABLET | Refills: 0 | OUTPATIENT
Start: 2023-11-07 | End: 2023-12-07

## 2023-11-07 RX ORDER — FLUCONAZOLE 150 MG/1
TABLET ORAL
Qty: 2 TABLET | Refills: 0 | Status: SHIPPED | OUTPATIENT
Start: 2023-11-07

## 2023-11-07 RX ORDER — ACETAMINOPHEN AND CODEINE PHOSPHATE 300; 30 MG/1; MG/1
1 TABLET ORAL EVERY 8 HOURS PRN
Qty: 42 TABLET | Refills: 0 | Status: SHIPPED | OUTPATIENT
Start: 2023-11-07 | End: 2023-12-07

## 2023-12-13 RX ORDER — FLUCONAZOLE 150 MG/1
150 TABLET ORAL
Qty: 2 TABLET | Refills: 0 | Status: SHIPPED | OUTPATIENT
Start: 2023-12-13

## 2023-12-26 NOTE — PRE-PROCEDURE INSTRUCTIONS
No answer, left message ? yes                    Unable to leave message ? When were you told to arrive at hospital ?  5745    Do you have a  ? Are you on any blood thinners ? If yes when did you stop taking ? Do you have your prep Rx filled and instruction ? Nothing to eat the day before , only clear liquids. Are you experiencing any covid symptoms ? Do you have any infections or rash we should be aware of ? Do you have the Hibiclens soap to use the night before and the morning of surgery ? Nothing to eat or drink after midnight, only a sip of water to take any medication instructed to take the night before. Wear comfortable clothing, leave any valuables at home, remove any jewelry and body piercing .

## 2023-12-27 ENCOUNTER — ANESTHESIA EVENT (OUTPATIENT)
Dept: ENDOSCOPY | Age: 48
End: 2023-12-27
Payer: COMMERCIAL

## 2023-12-28 ENCOUNTER — HOSPITAL ENCOUNTER (OUTPATIENT)
Age: 48
Setting detail: OUTPATIENT SURGERY
Discharge: HOME | End: 2023-12-28
Attending: SURGERY | Admitting: SURGERY
Payer: COMMERCIAL

## 2023-12-28 ENCOUNTER — ANESTHESIA (OUTPATIENT)
Dept: ENDOSCOPY | Age: 48
End: 2023-12-28
Payer: COMMERCIAL

## 2023-12-28 VITALS
DIASTOLIC BLOOD PRESSURE: 82 MMHG | OXYGEN SATURATION: 97 % | WEIGHT: 194 LBS | BODY MASS INDEX: 33.12 KG/M2 | SYSTOLIC BLOOD PRESSURE: 128 MMHG | HEIGHT: 64 IN | HEART RATE: 80 BPM | TEMPERATURE: 97.1 F | RESPIRATION RATE: 12 BRPM

## 2023-12-28 LAB — GLUCOSE BLD-MCNC: 172 MG/DL (ref 65–105)

## 2023-12-28 PROCEDURE — 3700000001 HC ADD 15 MINUTES (ANESTHESIA): Performed by: SURGERY

## 2023-12-28 PROCEDURE — 6360000002 HC RX W HCPCS

## 2023-12-28 PROCEDURE — 7100000001 HC PACU RECOVERY - ADDTL 15 MIN: Performed by: SURGERY

## 2023-12-28 PROCEDURE — 3609027000 HC COLONOSCOPY: Performed by: SURGERY

## 2023-12-28 PROCEDURE — 3700000000 HC ANESTHESIA ATTENDED CARE: Performed by: SURGERY

## 2023-12-28 PROCEDURE — 82947 ASSAY GLUCOSE BLOOD QUANT: CPT

## 2023-12-28 PROCEDURE — 7100000030 HC ASPR PHASE II RECOVERY - FIRST 15 MIN: Performed by: SURGERY

## 2023-12-28 PROCEDURE — 7100000010 HC PHASE II RECOVERY - FIRST 15 MIN: Performed by: SURGERY

## 2023-12-28 PROCEDURE — 7100000000 HC PACU RECOVERY - FIRST 15 MIN: Performed by: SURGERY

## 2023-12-28 PROCEDURE — 2580000003 HC RX 258: Performed by: ANESTHESIOLOGY

## 2023-12-28 PROCEDURE — 6370000000 HC RX 637 (ALT 250 FOR IP): Performed by: ANESTHESIOLOGY

## 2023-12-28 PROCEDURE — 2709999900 HC NON-CHARGEABLE SUPPLY: Performed by: SURGERY

## 2023-12-28 PROCEDURE — 2500000003 HC RX 250 WO HCPCS

## 2023-12-28 PROCEDURE — 7100000011 HC PHASE II RECOVERY - ADDTL 15 MIN: Performed by: SURGERY

## 2023-12-28 PROCEDURE — 7100000031 HC ASPR PHASE II RECOVERY - ADDTL 15 MIN: Performed by: SURGERY

## 2023-12-28 RX ORDER — FENTANYL CITRATE 0.05 MG/ML
25 INJECTION, SOLUTION INTRAMUSCULAR; INTRAVENOUS EVERY 5 MIN PRN
Status: DISCONTINUED | OUTPATIENT
Start: 2023-12-28 | End: 2023-12-28 | Stop reason: HOSPADM

## 2023-12-28 RX ORDER — LIDOCAINE HYDROCHLORIDE 10 MG/ML
1 INJECTION, SOLUTION EPIDURAL; INFILTRATION; INTRACAUDAL; PERINEURAL
Status: DISCONTINUED | OUTPATIENT
Start: 2023-12-28 | End: 2023-12-28 | Stop reason: HOSPADM

## 2023-12-28 RX ORDER — SODIUM CHLORIDE 0.9 % (FLUSH) 0.9 %
5-40 SYRINGE (ML) INJECTION EVERY 12 HOURS SCHEDULED
Status: DISCONTINUED | OUTPATIENT
Start: 2023-12-28 | End: 2023-12-28 | Stop reason: HOSPADM

## 2023-12-28 RX ORDER — ONDANSETRON 2 MG/ML
INJECTION INTRAMUSCULAR; INTRAVENOUS PRN
Status: DISCONTINUED | OUTPATIENT
Start: 2023-12-28 | End: 2023-12-28 | Stop reason: SDUPTHER

## 2023-12-28 RX ORDER — PROPOFOL 10 MG/ML
INJECTION, EMULSION INTRAVENOUS PRN
Status: DISCONTINUED | OUTPATIENT
Start: 2023-12-28 | End: 2023-12-28 | Stop reason: SDUPTHER

## 2023-12-28 RX ORDER — SODIUM CHLORIDE 0.9 % (FLUSH) 0.9 %
5-40 SYRINGE (ML) INJECTION PRN
Status: DISCONTINUED | OUTPATIENT
Start: 2023-12-28 | End: 2023-12-28 | Stop reason: HOSPADM

## 2023-12-28 RX ORDER — DEXAMETHASONE SODIUM PHOSPHATE 4 MG/ML
INJECTION, SOLUTION INTRA-ARTICULAR; INTRALESIONAL; INTRAMUSCULAR; INTRAVENOUS; SOFT TISSUE PRN
Status: DISCONTINUED | OUTPATIENT
Start: 2023-12-28 | End: 2023-12-28 | Stop reason: SDUPTHER

## 2023-12-28 RX ORDER — ONDANSETRON 2 MG/ML
4 INJECTION INTRAMUSCULAR; INTRAVENOUS
Status: DISCONTINUED | OUTPATIENT
Start: 2023-12-28 | End: 2023-12-28 | Stop reason: HOSPADM

## 2023-12-28 RX ORDER — SODIUM CHLORIDE 9 MG/ML
INJECTION, SOLUTION INTRAVENOUS PRN
Status: DISCONTINUED | OUTPATIENT
Start: 2023-12-28 | End: 2023-12-28 | Stop reason: HOSPADM

## 2023-12-28 RX ORDER — SODIUM CHLORIDE 9 MG/ML
INJECTION, SOLUTION INTRAVENOUS CONTINUOUS
Status: DISCONTINUED | OUTPATIENT
Start: 2023-12-28 | End: 2023-12-28 | Stop reason: HOSPADM

## 2023-12-28 RX ORDER — SCOLOPAMINE TRANSDERMAL SYSTEM 1 MG/1
1 PATCH, EXTENDED RELEASE TRANSDERMAL ONCE
Status: DISCONTINUED | OUTPATIENT
Start: 2023-12-28 | End: 2023-12-28 | Stop reason: HOSPADM

## 2023-12-28 RX ORDER — DIPHENHYDRAMINE HYDROCHLORIDE 50 MG/ML
12.5 INJECTION INTRAMUSCULAR; INTRAVENOUS
Status: DISCONTINUED | OUTPATIENT
Start: 2023-12-28 | End: 2023-12-28 | Stop reason: HOSPADM

## 2023-12-28 RX ORDER — LIDOCAINE HYDROCHLORIDE 20 MG/ML
INJECTION, SOLUTION INFILTRATION; PERINEURAL PRN
Status: DISCONTINUED | OUTPATIENT
Start: 2023-12-28 | End: 2023-12-28 | Stop reason: SDUPTHER

## 2023-12-28 RX ORDER — METOCLOPRAMIDE HYDROCHLORIDE 5 MG/ML
10 INJECTION INTRAMUSCULAR; INTRAVENOUS
Status: DISCONTINUED | OUTPATIENT
Start: 2023-12-28 | End: 2023-12-28 | Stop reason: HOSPADM

## 2023-12-28 RX ADMIN — SODIUM CHLORIDE: 9 INJECTION, SOLUTION INTRAVENOUS at 06:30

## 2023-12-28 RX ADMIN — ONDANSETRON 4 MG: 2 INJECTION INTRAMUSCULAR; INTRAVENOUS at 07:39

## 2023-12-28 RX ADMIN — PROPOFOL 300 MG: 10 INJECTION, EMULSION INTRAVENOUS at 07:30

## 2023-12-28 RX ADMIN — LIDOCAINE HYDROCHLORIDE 100 MG: 20 INJECTION, SOLUTION INFILTRATION; PERINEURAL at 07:30

## 2023-12-28 RX ADMIN — DEXAMETHASONE SODIUM PHOSPHATE 4 MG: 4 INJECTION INTRA-ARTICULAR; INTRALESIONAL; INTRAMUSCULAR; INTRAVENOUS; SOFT TISSUE at 07:39

## 2023-12-28 NOTE — OP NOTE
Patient: Netta Avila  YOB: 1975  MRN: [id number]    Date of Procedure: 12/28/2023  PROCEDURE NOTE    DATE OF PROCEDURE: 12/28/2023    SURGEON: Rain Beck MD    ASSISTANT: None    PREOPERATIVE DIAGNOSIS: Screening colonoscopy    POSTOPERATIVE DIAGNOSIS: Poor prep    OPERATION: Total colonoscopy to cecum with intubation of terminal ileum    ANESTHESIA: General    ESTIMATED BLOOD LOSS: None    COMPLICATIONS: None     SPECIMENS:  Was Not Obtained    HISTORY: The patient is a 53y.o. year old female with history of above preop diagnosis. I recommended colonoscopy with possible biopsy or polypectomy and I explained the risk, benefits, expected outcome, and alternatives to the procedure. Risks included but are not limited to bleeding, infection, respiratory distress, hypotension, and perforation of the colon and possibility of missing a lesion. The patient understands and is in agreement. PROCEDURE: The patient was given IV conscious sedation. The patient's SPO2 remained above 90% throughout the procedure. Digital rectal exam was normal.  The colonoscope was inserted through the anus into the rectum and advanced under direct vision to the cecum without difficulty. Terminal ileum was examined for approximately 2 inches. The prep was poor. Findings:  Terminal ileum: normal    Cecum/Ascending colon: Poor prep limited visualization    Transverse colon: Poor prep limited visualization    Descending/Sigmoid colon: Poor prep limited visualization    Rectum/Anus: examined in normal and retroflexed positions and was rectal exam was unremarkable. Poor prep limited visualization    Withdrawal Time was (minutes): 8        The colon was decompressed. While withdrawing the scope the above findings were verified and the scope was removed. The patient tolerated the procedure and conscious sedation without unusual events.     In the recovery room patient was examined and remains hemodynamically

## 2023-12-28 NOTE — ANESTHESIA PRE PROCEDURE
Department of Anesthesiology  Preprocedure Note       Name:  Juve Damon   Age:  53 y.o.  :  1975                                          MRN:  [id number]         Date:  2023      Surgeon: Brain Mckeon):  Dina Dean MD    Procedure: Procedure(s):  COLORECTAL CANCER SCREENING, NOT HIGH RISK    Medications prior to admission:   Prior to Admission medications    Medication Sig Start Date End Date Taking?  Authorizing Provider   fluconazole (DIFLUCAN) 150 MG tablet Take 1 tablet by mouth every 7 days  Patient not taking: Reported on 2023   Serenity Salvador, APRN - CNP   tamoxifen (NOLVADEX) 20 MG tablet Take 1 tablet by mouth daily 23   Randi Frias MD   cyclobenzaprine (FLEXERIL) 10 MG tablet  10/1/23   Jorgito Fried MD   BISACODYL 5 MG EC tablet TAKE 4 TABLETS BY MOUTH AS DIRECTED  Patient not taking: Reported on 2023   Jorgito Fried MD   polyethylene glycol (GLYCOLAX) 17 GM/SCOOP powder AS DIRECTED BY MOUTH ONCE DAILY FOR 1 DAY  Patient not taking: Reported on 2023   Jorgito Fried MD   ibuprofen (ADVIL;MOTRIN) 200 MG tablet Take 1 tablet by mouth every 6 hours as needed for Pain    Jorgito Fried MD   acetaminophen (TYLENOL) 325 MG tablet Take 2 tablets by mouth every 6 hours as needed for Pain    Jorgito Fried MD   BD PEN NEEDLE DOLORES 2ND GEN 32G X 4 MM MISC USE AS DIRECTED FOUR TIMES DAILY (INSURANCE IS ONLY PAYING FOR 3 TIMES DAILY) 3/31/23   Jorgito Fried MD   losartan (COZAAR) 50 MG tablet  23   Jorgito Fried MD   rosuvastatin (CRESTOR) 5 MG tablet Take 1 tablet by mouth daily 23   Jorgito Fried MD   OZEMPIC, 1 MG/DOSE, 4 MG/3ML SOPN Inject 0.5 mg into the skin once a week 3/23/23   Jorgito Fried MD   Insulin Degludec (TRESIBA) 100 UNIT/ML SOLN Inject 30 Units into the skin daily    Jorgito Fried MD       Current medications:    Current Facility-Administered

## 2023-12-28 NOTE — DISCHARGE INSTRUCTIONS
DISCHARGE INSTRUCTIONS FOR COLONOSCOPY    In order to continue your care at home, please follow the instructions below. For General Anesthesia:  Do not drink any alcoholic beverages or make any legal or important decisions for 24 hours. Do not drive or operate machinery for 24 hours. You may return to work after 24 hours. Diet    Drink plenty of fluids after surgery, unless you are on a fluid restriction. After general anesthesia, start out eating lightly (broth, soup, bread, etc.) advancing as tolerated to your usual diet. Try to avoid spicy or greasy/fatty foods for 24 hours. Avoid milk/milk product for several hours. Medications  Take medications as ordered by your surgeon. Activities  Limit your activities for 24 hours. Walk around to help pass gas. You may shower. Call your surgeon for the following: If you have abdominal pain that is not relieved by passing gas. For an oral temperature (by mouth) is 101 degrees or higher, chills or excessive sweating. You have increasing and progressive bleeding or drainage from surgery area. Persistent nausea or vomiting  Rectal bleeding (may be red, maroon, or black) or change in your bowel habits. Redness or swelling at the IV site. If you are unable to urinate within 8 hours of surgery. For any questions or concerns you may have.     Repeat colonoscopy with a 2-day prep is recommended

## 2023-12-28 NOTE — H&P
HISTORY and [id number]       NAME:  Netta Avila  MRN: [id number]   YOB: 1975   Date: 12/28/2023   Age: 53 y.o. Gender: female       COMPLAINT AND PRESENT HISTORY:     Netta Avila is 53 y.o.,  female, presents for COLORECTAL CANCER SCREENING, NOT HIGH RISK   Primary dx: Screening for malignant neoplasm of colon [Z12.11]. HPI:  Jose Antonio Beatty is 53 y.o.,   female, having a Screening Colonoscopy. This is patient's first Colonoscopy. Patient denies any  FH of Colon cancer   Patient reports changes in bowel habits. She has constipation due to medication ( Tamoxifen ),  No GI /Rectal bleeding, experiencing red/ black/ BRBPR stools. No abdominal pains or cramping. No heartburn or dysphagia. No nausea or vomiting, no abdominal bloating , she lost 40 pounds since last year due to medication and breast cancer. Pt denies fever/chills, chest pain or SOB      Review of additional significant medical hx:  (See chart for additional detail, including current medications /see ROS for current S/S):       NPO status: pt NPO since the past midnight   Medications taken TODAY (with sip of water): none  Anticoagulation status: none  Prep fully completed: YES. Pt reports her last BM is liquid   Denies personal hx of blood clots. Denies personal hx of MRSA infection. Denies any personal or family hx of previous complications w/anesthesia.     PAST MEDICAL HISTORY     Past Medical History:   Diagnosis Date    Anxiety     Bilateral carpal tunnel syndrome 07/21/2023    Breast cancer ([address])     Carcinoma of upper-outer quadrant of left breast in female, estrogen receptor positive ([address]) 05/31/2022    Endometriosis     GDM (gestational diabetes mellitus)     H/O hysterectomy for benign disease     History of therapeutic radiation     Primary hypertension 07/21/2023    Type 2 diabetes mellitus ([address]) 09/28/2023       SURGICAL HISTORY       Past Surgical History:   Procedure Laterality Date

## 2024-01-02 NOTE — PROGRESS NOTES
----- Message from Lala Solitario sent at 1/2/2024  1:46 PM CST -----  Regarding: Rx refill issue  Contact: pt  Type:  Needs Medical Advice    Who Called: pt    Would the patient rather a call back or a response via [facility]? Call back  Best Call Back Number: [phone]    Additional Information: sts she needs to speak to someone to explain how her refills need to be sent in--please advise         [address]       Radiation Oncology          [address] [facility]          [address] [place]        Shruti Goo: [phone]        F: [phone]       [address]       Radiation Oncology   [address] [facility], [address] [facility]       Shruti Goo: [phone]       F: [phone]       [facility]          RADIATION ONCOLOGY WEEKLY PROGRESS NOTE  Patient ID:   April L Margarita  : 1975   MRN: [id number]    Location:  [facility] Radiation Oncology,   [address] [facility] [place]   [phone]     DIAGNOSIS:  Cancer Staging  Malignant neoplasm of upper-outer quadrant of left breast in female, estrogen receptor positive ([facility] [address]  Staging form: Breast, AJCC 8th Edition  - Clinical stage from 2022: Stage IA (cT1, cN0, cM0, G1, ER+, MD+, HER2-) - Signed by Spencer Chavira MD on 2022      TREATMENT DETAILS:  Treatment Site: L Breast  Actual Dose: 1596cGy  Total Planned Dose: 4256cGy + 1000cGy  Treatment Technique: 3D-CRT  Fraction Technique: Daily  Therapy imaging monitoring: KV match daily with MV ports  Concurrent Chemotherapy: NA    SUBJECTIVE:   Patient seen for their weekly on treatment evaluation today. Patient is doing well though she does note some swelling and tenderness of the left breast.  She did have some relief after massaging during her physical therapy session. She denies any difficulty range of motion. She denies any fatigue or changes in appetite though has had some episodes of nausea. Her skin is doing well with no irritation.     OBJECTIVE:   CHAPERONE: Family/friend/companieon Present    ECO Asymptomatic    VITAL SIGNS: BP (!) 169/84   Pulse 87   Temp 97.4 °F (36.3 °C) (Temporal)   Resp 16   Wt 212 lb 6.4 oz (96.3 kg)   LMP 2011   BMI 36.46 kg/m²   Wt Readings from Last 5 Encounters:   22 212 lb 6.4 oz (96.3 kg)   08/15/22 213 lb 3.2 oz (96.7 kg)   22 213 lb 3.2 oz (96.7 kg)   07/19/22 213 lb (96.6 kg)   07/07/22 213 lb (96.6 kg)     GENERAL:  General appearance is that of a well-nourished, well-developed in no apparent distress. HEENT: Normocephalic, atraumatic, EOMI, moist mucosa, no erythema. LUNGS:  Pulmonary effort normal. Normal breath sounds. EXTREMITIES:  No edema. Normal ROM. NEUROLOGICAL: Alert and oriented. Strength and sensation intact bilaterally. PSYCH: Mood normal, behavior normal.  SKIN: No erythema, no desquamation. LABS:  WBC   Date Value Ref Range Status   06/22/2022 13.9 (H) 3.5 - 11.3 k/uL Final     Segs Absolute   Date Value Ref Range Status   06/22/2022 10.17 (H) 1.50 - 8.10 k/uL Final     Hemoglobin   Date Value Ref Range Status   06/22/2022 13.4 11.9 - 15.1 g/dL Final     Platelets   Date Value Ref Range Status   06/22/2022 357 138 - 453 k/uL Final     Creatinine   Date Value Ref Range Status   06/22/2022 0.58 0.50 - 0.90 mg/dL Final   07/11/2017 0.54 0.50 - 0.90 mg/dL Final     No results found for: , CEA  No results found for: PSA    MEDICATIONS:    Current Outpatient Medications:     betamethasone valerate (VALISONE) 0.1 % cream, Apply topically 2-3 times daily. , Disp: 45 g, Rfl: 2    fluconazole (DIFLUCAN) 150 MG tablet, TAKE 1 TABLET BY MOUTH ONCE FOR 1 DOSE REPEAT DOSE IN 7 DAYS. (Patient not taking: Reported on 9/14/2022), Disp: 2 tablet, Rfl: 0    docusate sodium (COLACE) 100 MG capsule, Take 1 capsule by mouth 2 times daily (Patient not taking: Reported on 8/15/2022), Disp: 20 capsule, Rfl: 0    tiZANidine (ZANAFLEX) 2 MG capsule, TAKE 1 TABLET BY MOUTH AT BEDTIME AS NEEDED (Patient not taking: Reported on 8/15/2022), Disp: , Rfl:     Insulin Degludec (TRESIBA) 100 UNIT/ML SOLN, Inject 14 Units into the skin daily , Disp: , Rfl:     OZEMPIC, 0.25 OR 0.5 MG/DOSE, 2 MG/1.5ML SOPN, INJECT 0.25MG ONCE WEEKLY AS DIRECTED 30, Disp: , Rfl:     escitalopram (LEXAPRO) 10 MG tablet, TAKE 1 TABLET BY MOUTH TWICE A DAY, Disp: , PAST SURGICAL HISTORY:  No significant past surgical history

## 2024-03-18 ENCOUNTER — OFFICE VISIT (OUTPATIENT)
Dept: ONCOLOGY | Age: 49
End: 2024-03-18
Payer: COMMERCIAL

## 2024-03-18 DIAGNOSIS — C50.412 MALIGNANT NEOPLASM OF UPPER-OUTER QUADRANT OF LEFT BREAST IN FEMALE, ESTROGEN RECEPTOR POSITIVE (HCC): Primary | ICD-10-CM

## 2024-03-18 DIAGNOSIS — Z17.0 MALIGNANT NEOPLASM OF UPPER-OUTER QUADRANT OF LEFT BREAST IN FEMALE, ESTROGEN RECEPTOR POSITIVE (HCC): Primary | ICD-10-CM

## 2024-03-18 PROCEDURE — 99212 OFFICE O/P EST SF 10 MIN: CPT | Performed by: INTERNAL MEDICINE

## 2024-03-18 PROCEDURE — 93702 BIS XTRACELL FLUID ANALYSIS: CPT | Performed by: INTERNAL MEDICINE

## 2024-03-18 NOTE — PROGRESS NOTES
Patient:  Netta Avila   MRN:  [id number]   PCP:  Bruce Olivera MD   Oncologist:  Dr. Frias    Date of test:  3/18/2024   Tested Body Part:  Left Arm  Testing Stage:  Surveillance    SOZO Lymphedema Assessment:  Measurement Type: Unilateral, Body Element: Arm, Limb Dominance: Right, Physical Assessment: Other (See Comment) (does have some swelling in chest wall. patient states she has been massaging the area and wearing her bra that helps.), Purpose for Testing: Surveillance    SOZO Measurement Left:  Current L-Dex Score - Left: -1.9    SOZO Measurement Right:          You will be re-tested following cancer treatment:  Every 3 months for the first 3 years following treatment  Every 6 months during years 4 and 5 following treatment  Annually thereafter    Currently you are following the 3 Months follow up treatment.  Next Scheduled visit:  6/17/2024

## 2024-03-21 RX ORDER — FLUCONAZOLE 150 MG/1
TABLET ORAL
Qty: 2 TABLET | Refills: 0 | Status: SHIPPED | OUTPATIENT
Start: 2024-03-21

## 2024-04-01 ASSESSMENT — PATIENT HEALTH QUESTIONNAIRE - PHQ9
2. FEELING DOWN, DEPRESSED OR HOPELESS: NOT AT ALL
SUM OF ALL RESPONSES TO PHQ QUESTIONS 1-9: 0
1. LITTLE INTEREST OR PLEASURE IN DOING THINGS: NOT AT ALL
2. FEELING DOWN, DEPRESSED OR HOPELESS: NOT AT ALL
SUM OF ALL RESPONSES TO PHQ9 QUESTIONS 1 & 2: 0
1. LITTLE INTEREST OR PLEASURE IN DOING THINGS: NOT AT ALL
SUM OF ALL RESPONSES TO PHQ QUESTIONS 1-9: 0
SUM OF ALL RESPONSES TO PHQ9 QUESTIONS 1 & 2: 0

## 2024-04-02 PROBLEM — R35.0 INCREASED FREQUENCY OF URINATION: Status: ACTIVE | Noted: 2023-10-24

## 2024-04-02 PROBLEM — E55.9 VITAMIN D DEFICIENCY: Status: ACTIVE | Noted: 2023-10-24

## 2024-04-02 RX ORDER — INSULIN ASPART 100 [IU]/ML
2 INJECTION, SOLUTION INTRAVENOUS; SUBCUTANEOUS
COMMUNITY
Start: 2024-01-24 | End: 2025-01-23

## 2024-04-02 RX ORDER — INSULIN GLARGINE 300 U/ML
INJECTION, SOLUTION SUBCUTANEOUS
COMMUNITY
Start: 2024-01-24

## 2024-04-02 RX ORDER — METHOCARBAMOL 750 MG/1
750 TABLET, FILM COATED ORAL 3 TIMES DAILY
COMMUNITY
Start: 2024-01-24

## 2024-04-04 ENCOUNTER — OFFICE VISIT (OUTPATIENT)
Dept: OBGYN CLINIC | Age: 49
End: 2024-04-04
Payer: COMMERCIAL

## 2024-04-04 VITALS
WEIGHT: 195 LBS | SYSTOLIC BLOOD PRESSURE: 126 MMHG | DIASTOLIC BLOOD PRESSURE: 84 MMHG | HEIGHT: 64 IN | BODY MASS INDEX: 33.29 KG/M2

## 2024-04-04 DIAGNOSIS — Z90.710 H/O HYSTERECTOMY FOR BENIGN DISEASE: ICD-10-CM

## 2024-04-04 DIAGNOSIS — C50.412 MALIGNANT NEOPLASM OF UPPER-OUTER QUADRANT OF LEFT FEMALE BREAST, UNSPECIFIED ESTROGEN RECEPTOR STATUS (HCC): ICD-10-CM

## 2024-04-04 DIAGNOSIS — N89.8 VAGINAL MASS: Primary | ICD-10-CM

## 2024-04-04 PROCEDURE — 3079F DIAST BP 80-89 MM HG: CPT | Performed by: OBSTETRICS & GYNECOLOGY

## 2024-04-04 PROCEDURE — 99213 OFFICE O/P EST LOW 20 MIN: CPT | Performed by: OBSTETRICS & GYNECOLOGY

## 2024-04-04 PROCEDURE — 3074F SYST BP LT 130 MM HG: CPT | Performed by: OBSTETRICS & GYNECOLOGY

## 2024-04-04 RX ORDER — SEMAGLUTIDE 0.68 MG/ML
INJECTION, SOLUTION SUBCUTANEOUS
COMMUNITY
Start: 2024-04-04

## 2024-04-04 NOTE — PROGRESS NOTES
determining the total time component.  Counseling and education regarding her diagnosis listed below and her options regarding those diagnoses were also included in determining her time component.      Diagnosis Orders   1. Vaginal mass  US NON OB TRANSVAGINAL      2. H/O hysterectomy for benign disease  US NON OB TRANSVAGINAL      3. Malignant neoplasm of upper-outer quadrant of left female breast, unspecified estrogen receptor status (HCC)  US NON OB TRANSVAGINAL           The patient had her preventative health maintenance recommendations and follow-up reviewed with her at the completion of her visit.

## 2024-04-16 ENCOUNTER — TELEPHONE (OUTPATIENT)
Dept: OBGYN CLINIC | Age: 49
End: 2024-04-16

## 2024-04-16 NOTE — TELEPHONE ENCOUNTER
----- Message from CLAUDIO Pereyra CNP sent at 4/16/2024  4:17 PM EDT -----  Normal pelvic ultrasound  Recently had appointment with Dr Barksdale.  Ultrasound and last dr note recommends to have patient schedule follow up with physician  Please let patient know results and schedule follow up

## 2024-04-18 ENCOUNTER — HOSPITAL ENCOUNTER (OUTPATIENT)
Dept: WOMENS IMAGING | Age: 49
Discharge: HOME | End: 2024-04-20
Payer: COMMERCIAL

## 2024-04-18 ENCOUNTER — HOSPITAL ENCOUNTER (OUTPATIENT)
Dept: WOMENS IMAGING | Age: 49
End: 2024-04-18
Payer: COMMERCIAL

## 2024-04-18 VITALS — HEIGHT: 64 IN | BODY MASS INDEX: 33.29 KG/M2 | WEIGHT: 195 LBS

## 2024-04-18 DIAGNOSIS — Z17.0 MALIGNANT NEOPLASM OF UPPER-OUTER QUADRANT OF LEFT BREAST IN FEMALE, ESTROGEN RECEPTOR POSITIVE (HCC): ICD-10-CM

## 2024-04-18 DIAGNOSIS — C50.412 MALIGNANT NEOPLASM OF UPPER-OUTER QUADRANT OF LEFT BREAST IN FEMALE, ESTROGEN RECEPTOR POSITIVE (HCC): ICD-10-CM

## 2024-04-18 PROCEDURE — G0279 TOMOSYNTHESIS, MAMMO: HCPCS

## 2024-05-02 ENCOUNTER — TRANSCRIBE ORDERS (OUTPATIENT)
Dept: ADMINISTRATIVE | Age: 49
End: 2024-05-02

## 2024-05-02 ENCOUNTER — HOSPITAL ENCOUNTER (OUTPATIENT)
Dept: CT IMAGING | Age: 49
Discharge: HOME | End: 2024-05-04
Attending: FAMILY MEDICINE
Payer: COMMERCIAL

## 2024-05-02 DIAGNOSIS — N23 RENAL COLIC ON RIGHT SIDE: Primary | ICD-10-CM

## 2024-05-02 DIAGNOSIS — N23 RENAL COLIC ON RIGHT SIDE: ICD-10-CM

## 2024-05-02 PROCEDURE — 74176 CT ABD & PELVIS W/O CONTRAST: CPT

## 2024-05-06 PROBLEM — G56.03 BILATERAL CARPAL TUNNEL SYNDROME: Status: RESOLVED | Noted: 2023-07-21 | Resolved: 2024-05-06

## 2024-05-06 PROBLEM — K58.9 IRRITABLE BOWEL SYNDROME: Status: RESOLVED | Noted: 2023-07-21 | Resolved: 2024-05-06

## 2024-05-07 ENCOUNTER — HOSPITAL ENCOUNTER (OUTPATIENT)
Age: 49
Setting detail: SPECIMEN
Discharge: HOME | End: 2024-05-07

## 2024-05-07 ENCOUNTER — OFFICE VISIT (OUTPATIENT)
Dept: OBGYN CLINIC | Age: 49
End: 2024-05-07
Payer: COMMERCIAL

## 2024-05-07 VITALS
SYSTOLIC BLOOD PRESSURE: 124 MMHG | DIASTOLIC BLOOD PRESSURE: 80 MMHG | BODY MASS INDEX: 33.8 KG/M2 | HEIGHT: 64 IN | WEIGHT: 198 LBS

## 2024-05-07 DIAGNOSIS — N89.8 VAGINAL WALL CYST: ICD-10-CM

## 2024-05-07 DIAGNOSIS — N89.8 VAGINAL WALL CYST: Primary | ICD-10-CM

## 2024-05-07 DIAGNOSIS — Z90.710 H/O HYSTERECTOMY FOR BENIGN DISEASE: ICD-10-CM

## 2024-05-07 PROCEDURE — 10060 I&D ABSCESS SIMPLE/SINGLE: CPT | Performed by: OBSTETRICS & GYNECOLOGY

## 2024-05-07 PROCEDURE — 99999 PR OFFICE/OUTPT VISIT,PROCEDURE ONLY: CPT | Performed by: OBSTETRICS & GYNECOLOGY

## 2024-05-07 RX ORDER — DAPAGLIFLOZIN 10 MG/1
10 TABLET, FILM COATED ORAL DAILY
COMMUNITY
Start: 2024-04-24 | End: 2025-04-24

## 2024-05-07 RX ORDER — CLINDAMYCIN HYDROCHLORIDE 300 MG/1
300 CAPSULE ORAL 3 TIMES DAILY
Qty: 30 CAPSULE | Refills: 0 | Status: SHIPPED | OUTPATIENT
Start: 2024-05-07 | End: 2024-05-17

## 2024-05-07 NOTE — PROGRESS NOTES
breasts.  Computer aided detection was utilized in the interpretation of this exam. Views: MLO and craniocaudal views of both breasts.  Exaggerated craniocaudal views of both breasts. COMPARISON: Left mammogram 10 October 2023; bilateral mammogram 19 April 2023 HISTORY: ORDERING SYSTEM PROVIDED HISTORY: Malignant neoplasm of upper-outer quadrant of left breast in female, estrogen receptor positive (HCC) TECHNOLOGIST PROVIDED HISTORY: Is the patient pregnant?->No Prior left lumpectomy and radiation therapy in 2022. FINDINGS: Both breasts are composed of heterogeneously dense parenchyma.  No skin thickening, nipple contour changes, suspicious calcifications, suspicious masses, new areas of architectural distortion or significant interval changes are noted.  Stable post treatment changes are present in the left breast. Stable benign-appearing calcifications are present in both breasts.     Stable post treatment changes left breast.  No evidence of malignancy either breast.  Follow-up screening mammogram in 1 year is advised. BI-RADS 2 BIRADS: BIRADS - CATEGORY 2 Benign Findings.  Normal interval follow-up is recommended in 12 months. OVERALL ASSESSMENT - BENIGN A letter of notification will be sent to the patient regarding the results. The American College of Radiology recommends annual mammograms for women 40 years and older.    US PELVIS COMPLETE NON-OB TRANSABDOMINAL AND TRANSVAGINAL    Result Date: 4/16/2024  Table formatting from the original result was not included. GYNECOLOGY ULTRASOUND REPORT [facility]  AND  Gynecology [address] [place] [address]; [address] [place], [place] [id number] [phone] [place] [phone] Fax 4/16/2024 MRN: [id number] Contact Serial #: [id number] Netta Avila YOB: 1975 Age: 49 y.o. The ultrasound images were reviewed. Please see the attached ultrasound report. Ultrasonographer: Che Pratt RDMS Assessment: Netta Avila is a 49 y.o. female 1. H/O hysterectomy for

## 2024-05-10 ENCOUNTER — TELEPHONE (OUTPATIENT)
Dept: OBGYN CLINIC | Age: 49
End: 2024-05-10

## 2024-05-10 LAB
MICROORGANISM SPEC CULT: ABNORMAL
MICROORGANISM/AGENT SPEC: ABNORMAL
SPECIMEN DESCRIPTION: ABNORMAL

## 2024-05-10 NOTE — TELEPHONE ENCOUNTER
Called and spoke with patient she is taking the antibiotic as directed. Patient needs a 2 week follow up with  to recheck vaginal cyst. Call transferred to [place] to schedule.

## 2024-05-10 NOTE — TELEPHONE ENCOUNTER
----- Message from CLAUDIO Pereyra - CNP sent at 5/10/2024  7:50 AM EDT -----  Few gram positive cocci, normal skin hubert noted in culture  Patient to continue on cleocin as directed

## 2024-05-16 ENCOUNTER — HOSPITAL ENCOUNTER (OUTPATIENT)
Dept: RADIATION ONCOLOGY | Age: 49
Discharge: HOME | End: 2024-05-16
Payer: COMMERCIAL

## 2024-05-16 VITALS
BODY MASS INDEX: 33.51 KG/M2 | DIASTOLIC BLOOD PRESSURE: 79 MMHG | WEIGHT: 195.2 LBS | OXYGEN SATURATION: 100 % | TEMPERATURE: 98.2 F | HEART RATE: 90 BPM | RESPIRATION RATE: 16 BRPM | SYSTOLIC BLOOD PRESSURE: 147 MMHG

## 2024-05-16 PROCEDURE — 99212 OFFICE O/P EST SF 10 MIN: CPT | Performed by: RADIOLOGY

## 2024-05-16 NOTE — PROGRESS NOTES
April L Margarita  5/16/2024  10:36 AM      Vitals:    05/16/24 1024   BP: (!) 147/79   Pulse: 90   Resp: 16   Temp: 98.2 °F (36.8 °C)   SpO2: 100%    :     Pain Assessment: None - Denies Pain          Wt Readings from Last 1 Encounters:   05/16/24 88.5 kg (195 lb 3.2 oz)                Current Outpatient Medications:     dapagliflozin (FARXIGA) 10 MG tablet, Take 1 tablet by mouth daily, Disp: , Rfl:     clindamycin (CLEOCIN) 300 MG capsule, Take 1 capsule by mouth 3 times daily for 10 days (Patient not taking: Reported on 5/16/2024), Disp: 30 capsule, Rfl: 0    methocarbamol (ROBAXIN) 750 MG tablet, Take 1 tablet by mouth 3 times daily (Patient not taking: Reported on 5/16/2024), Disp: , Rfl:     TOUJEO MAX SOLOSTAR 300 UNIT/ML SOPN, , Disp: , Rfl:     NOVOLOG FLEXPEN 100 UNIT/ML injection pen, Inject 2 Units into the skin 3 times daily (before meals), Disp: , Rfl:     blood glucose test strips (CONTOUR NEXT TEST) strip, 1 each by Other route 4 times daily, Disp: , Rfl:     tamoxifen (NOLVADEX) 20 MG tablet, Take 1 tablet by mouth daily, Disp: 90 tablet, Rfl: 5    cyclobenzaprine (FLEXERIL) 10 MG tablet, , Disp: , Rfl:     ibuprofen (ADVIL;MOTRIN) 200 MG tablet, Take 1 tablet by mouth every 6 hours as needed for Pain, Disp: , Rfl:     acetaminophen (TYLENOL) 325 MG tablet, Take 2 tablets by mouth every 6 hours as needed for Pain, Disp: , Rfl:     BD PEN NEEDLE DOLORES 2ND GEN 32G X 4 MM MISC, USE AS DIRECTED FOUR TIMES DAILY (INSURANCE IS ONLY PAYING FOR 3 TIMES DAILY), Disp: , Rfl:     losartan (COZAAR) 50 MG tablet, , Disp: , Rfl:     rosuvastatin (CRESTOR) 5 MG tablet, Take 1 tablet by mouth daily, Disp: , Rfl:                FALLS RISK SCREEN  Instructions:  Assess the patient and enter the appropriate indicators that are present for fall risk identification.   Total the numbers entered and assign a fall risk score from Table 2.  Reassess patient at a minimum every 12 weeks or with status change.    Assessment

## 2024-05-28 ENCOUNTER — OFFICE VISIT (OUTPATIENT)
Dept: OBGYN CLINIC | Age: 49
End: 2024-05-28
Payer: COMMERCIAL

## 2024-05-28 ENCOUNTER — HOSPITAL ENCOUNTER (OUTPATIENT)
Age: 49
Setting detail: SPECIMEN
Discharge: HOME | End: 2024-05-28

## 2024-05-28 VITALS
BODY MASS INDEX: 33.29 KG/M2 | HEIGHT: 64 IN | DIASTOLIC BLOOD PRESSURE: 74 MMHG | WEIGHT: 195 LBS | SYSTOLIC BLOOD PRESSURE: 118 MMHG

## 2024-05-28 DIAGNOSIS — Z90.710 H/O HYSTERECTOMY FOR BENIGN DISEASE: ICD-10-CM

## 2024-05-28 DIAGNOSIS — B37.31 YEAST INFECTION OF THE VAGINA: Primary | ICD-10-CM

## 2024-05-28 DIAGNOSIS — N89.8 VAGINAL WALL CYST: ICD-10-CM

## 2024-05-28 PROBLEM — E55.9 VITAMIN D DEFICIENCY: Status: RESOLVED | Noted: 2023-10-24 | Resolved: 2024-05-28

## 2024-05-28 PROCEDURE — 3074F SYST BP LT 130 MM HG: CPT | Performed by: OBSTETRICS & GYNECOLOGY

## 2024-05-28 PROCEDURE — 99213 OFFICE O/P EST LOW 20 MIN: CPT | Performed by: OBSTETRICS & GYNECOLOGY

## 2024-05-28 PROCEDURE — 3078F DIAST BP <80 MM HG: CPT | Performed by: OBSTETRICS & GYNECOLOGY

## 2024-05-28 RX ORDER — CLOTRIMAZOLE AND BETAMETHASONE DIPROPIONATE 10; .64 MG/G; MG/G
CREAM TOPICAL
Qty: 45 G | Refills: 1 | Status: SHIPPED | OUTPATIENT
Start: 2024-05-28

## 2024-05-28 RX ORDER — FLUCONAZOLE 150 MG/1
150 TABLET ORAL
Qty: 2 TABLET | Refills: 0 | Status: SHIPPED | OUTPATIENT
Start: 2024-05-28

## 2024-05-28 NOTE — PROGRESS NOTES
Netta Avila  2024    YOB: 1975          The patient was seen today. She is here regarding follow up on vaginal cuff cyst drainage. Pt states that her symptoms from prior to the drainage are resolved. Pt with a yeast infection . Her bowels are regular and she is voiding without difficulty.     HPI:  Netta Avila is a 49 y.o. female        OB History    Para Term  AB Living   3 2 2 0 0 2   SAB IAB Ectopic Molar Multiple Live Births   0 0 0 0 0 2      # Outcome Date GA Lbr Glenn/2nd Weight Sex Delivery Anes PTL Lv   3                Birth Comments: System Generated. Please review and update pregnancy details.   2 Term    3.856 kg (8 lb 8 oz) M CS-Unspec   NORMA   1 Term    3.742 kg (8 lb 4 oz) M CS-Unspec   NORMA       Past Medical History:   Diagnosis Date    Anxiety     Bilateral carpal tunnel syndrome 2023    Breast cancer (HCC)     Carcinoma of upper-outer quadrant of left breast in female, estrogen receptor positive (HCC) 2022    Endometriosis     GDM (gestational diabetes mellitus)     H/O hysterectomy for benign disease     History of therapeutic radiation     Neuropathy 2023    Primary hypertension 2023    Type 2 diabetes mellitus (HCC) 2023       Past Surgical History:   Procedure Laterality Date    BREAST LUMPECTOMY Left 2022    LEFT BREAST LUMPECTOMY AXILLARY SENTINEL NODE DISSECTION WITH NEOPROBE AND LOCALIZER    BREAST SURGERY      biopsy     SECTION      x2    COLONOSCOPY N/A 2023    COLORECTAL CANCER SCREENING, NOT HIGH RISK performed by Keny Patton MD at [facility] ENDO    ENTEROCELE REPAIR  2012    HYSTERECTOMY (CERVIX STATUS UNKNOWN)  2012    LAPAROSCOPY  2013    lysis of adhesions    MASTECTOMY Left 2022    LEFT BREAST LUMPECTOMY AXILLARY SENTINEL NODE DISSECTION WITH NEOPROBE AND LOCALIZER performed by Arturo Morel DO at [facility] OR    TUBAL LIGATION      w/ mirena

## 2024-05-29 DIAGNOSIS — B37.31 YEAST INFECTION OF THE VAGINA: ICD-10-CM

## 2024-05-31 ENCOUNTER — TELEPHONE (OUTPATIENT)
Dept: OBGYN CLINIC | Age: 49
End: 2024-05-31

## 2024-05-31 ENCOUNTER — OFFICE VISIT (OUTPATIENT)
Dept: ONCOLOGY | Age: 49
End: 2024-05-31
Payer: COMMERCIAL

## 2024-05-31 ENCOUNTER — TELEPHONE (OUTPATIENT)
Dept: ONCOLOGY | Age: 49
End: 2024-05-31

## 2024-05-31 VITALS
BODY MASS INDEX: 33.18 KG/M2 | SYSTOLIC BLOOD PRESSURE: 145 MMHG | OXYGEN SATURATION: 99 % | RESPIRATION RATE: 18 BRPM | DIASTOLIC BLOOD PRESSURE: 77 MMHG | HEART RATE: 98 BPM | TEMPERATURE: 97.8 F | WEIGHT: 193.3 LBS

## 2024-05-31 DIAGNOSIS — Z17.0 MALIGNANT NEOPLASM OF UPPER-OUTER QUADRANT OF LEFT BREAST IN FEMALE, ESTROGEN RECEPTOR POSITIVE (HCC): Primary | ICD-10-CM

## 2024-05-31 DIAGNOSIS — C50.412 MALIGNANT NEOPLASM OF UPPER-OUTER QUADRANT OF LEFT BREAST IN FEMALE, ESTROGEN RECEPTOR POSITIVE (HCC): Primary | ICD-10-CM

## 2024-05-31 PROCEDURE — 3077F SYST BP >= 140 MM HG: CPT | Performed by: INTERNAL MEDICINE

## 2024-05-31 PROCEDURE — 99211 OFF/OP EST MAY X REQ PHY/QHP: CPT | Performed by: INTERNAL MEDICINE

## 2024-05-31 PROCEDURE — 99214 OFFICE O/P EST MOD 30 MIN: CPT | Performed by: INTERNAL MEDICINE

## 2024-05-31 PROCEDURE — 3078F DIAST BP <80 MM HG: CPT | Performed by: INTERNAL MEDICINE

## 2024-05-31 RX ORDER — TAMOXIFEN CITRATE 20 MG/1
20 TABLET ORAL DAILY
Qty: 90 TABLET | Refills: 3 | Status: SHIPPED | OUTPATIENT
Start: 2024-05-31

## 2024-05-31 NOTE — TELEPHONE ENCOUNTER
Instructions   from Dr. Farhad Frias MD    Continue Tamoxifen  RV 6 months     RV 12/16/24 at 10 am

## 2024-05-31 NOTE — TELEPHONE ENCOUNTER
----- Message from CLAUDIO Morocho - NP sent at 5/30/2024  5:05 PM EDT -----  + yeast  Diflucan 150 mg PO x 1 repeat in 7 days   
Patient notified of results, Dr Barksdale previously sent in medication to pharmacy   
3 tower
Warm/Dry

## 2024-06-01 LAB
MICROORGANISM SPEC CULT: ABNORMAL
SPECIMEN DESCRIPTION: ABNORMAL

## 2024-09-16 ENCOUNTER — OFFICE VISIT (OUTPATIENT)
Dept: ONCOLOGY | Age: 49
End: 2024-09-16
Payer: COMMERCIAL

## 2024-09-16 DIAGNOSIS — Z17.0 MALIGNANT NEOPLASM OF UPPER-OUTER QUADRANT OF LEFT BREAST IN FEMALE, ESTROGEN RECEPTOR POSITIVE (HCC): Primary | ICD-10-CM

## 2024-09-16 DIAGNOSIS — C50.412 MALIGNANT NEOPLASM OF UPPER-OUTER QUADRANT OF LEFT BREAST IN FEMALE, ESTROGEN RECEPTOR POSITIVE (HCC): Primary | ICD-10-CM

## 2024-09-16 PROCEDURE — 93702 BIS XTRACELL FLUID ANALYSIS: CPT

## 2024-09-16 PROCEDURE — 99211 OFF/OP EST MAY X REQ PHY/QHP: CPT | Performed by: INTERNAL MEDICINE

## 2024-09-17 ENCOUNTER — TELEPHONE (OUTPATIENT)
Dept: OBGYN CLINIC | Age: 49
End: 2024-09-17

## 2024-09-17 RX ORDER — FLUCONAZOLE 150 MG/1
TABLET ORAL
Qty: 2 TABLET | Refills: 0 | OUTPATIENT
Start: 2024-09-17

## 2024-09-17 RX ORDER — ACYCLOVIR 400 MG/1
TABLET ORAL
COMMUNITY
Start: 2024-08-27

## 2024-09-17 RX ORDER — FLUCONAZOLE 150 MG/1
150 TABLET ORAL WEEKLY
Qty: 2 TABLET | Refills: 2 | Status: SHIPPED | OUTPATIENT
Start: 2024-09-17

## 2024-09-20 ENCOUNTER — OFFICE VISIT (OUTPATIENT)
Dept: OBGYN CLINIC | Age: 49
End: 2024-09-20
Payer: COMMERCIAL

## 2024-09-20 VITALS
DIASTOLIC BLOOD PRESSURE: 80 MMHG | BODY MASS INDEX: 33.46 KG/M2 | SYSTOLIC BLOOD PRESSURE: 116 MMHG | HEIGHT: 64 IN | WEIGHT: 196 LBS

## 2024-09-20 DIAGNOSIS — Z01.419 WELL FEMALE EXAM WITH ROUTINE GYNECOLOGICAL EXAM: Primary | ICD-10-CM

## 2024-09-20 PROCEDURE — 3079F DIAST BP 80-89 MM HG: CPT | Performed by: NURSE PRACTITIONER

## 2024-09-20 PROCEDURE — 99396 PREV VISIT EST AGE 40-64: CPT | Performed by: NURSE PRACTITIONER

## 2024-09-20 PROCEDURE — 3074F SYST BP LT 130 MM HG: CPT | Performed by: NURSE PRACTITIONER

## 2024-12-06 ENCOUNTER — OFFICE VISIT (OUTPATIENT)
Dept: ONCOLOGY | Age: 49
End: 2024-12-06
Payer: COMMERCIAL

## 2024-12-06 ENCOUNTER — TELEPHONE (OUTPATIENT)
Dept: ONCOLOGY | Age: 49
End: 2024-12-06

## 2024-12-06 VITALS
OXYGEN SATURATION: 98 % | HEART RATE: 93 BPM | WEIGHT: 209.1 LBS | SYSTOLIC BLOOD PRESSURE: 139 MMHG | RESPIRATION RATE: 18 BRPM | TEMPERATURE: 97 F | DIASTOLIC BLOOD PRESSURE: 90 MMHG | BODY MASS INDEX: 35.89 KG/M2

## 2024-12-06 DIAGNOSIS — Z12.31 OTHER SCREENING MAMMOGRAM: ICD-10-CM

## 2024-12-06 DIAGNOSIS — C50.412 MALIGNANT NEOPLASM OF UPPER-OUTER QUADRANT OF LEFT BREAST IN FEMALE, ESTROGEN RECEPTOR POSITIVE (HCC): Primary | ICD-10-CM

## 2024-12-06 DIAGNOSIS — Z17.0 MALIGNANT NEOPLASM OF UPPER-OUTER QUADRANT OF LEFT BREAST IN FEMALE, ESTROGEN RECEPTOR POSITIVE (HCC): Primary | ICD-10-CM

## 2024-12-06 PROCEDURE — 99211 OFF/OP EST MAY X REQ PHY/QHP: CPT | Performed by: INTERNAL MEDICINE

## 2024-12-06 PROCEDURE — 3080F DIAST BP >= 90 MM HG: CPT | Performed by: INTERNAL MEDICINE

## 2024-12-06 PROCEDURE — 99214 OFFICE O/P EST MOD 30 MIN: CPT | Performed by: INTERNAL MEDICINE

## 2024-12-06 PROCEDURE — 3075F SYST BP GE 130 - 139MM HG: CPT | Performed by: INTERNAL MEDICINE

## 2024-12-06 RX ORDER — CLOTRIMAZOLE AND BETAMETHASONE DIPROPIONATE 10; .64 MG/G; MG/G
CREAM TOPICAL
Qty: 45 G | Refills: 1 | Status: SHIPPED | OUTPATIENT
Start: 2024-12-06

## 2024-12-06 NOTE — PROGRESS NOTES
Chief Complaint   Patient presents with    Follow-up     Review status of disease     Other     Dry cough since beginning of summer     DIAGNOSIS:         Stage [id number] Well differentiated invasive ductal carcinoma of the left breast upper outer quadrant.  ER positive, WI positive, HER2/checo not amplified.  Oncotype Dx with low recurrence score and no benefits of chemotherapy.   CURRENT THERAPY:         S/p lumpectomy and sentinel LN biopsy 22  S/p radiation therapy completed 2022  Started Tamoxifen 2022.   BRIEF CASE HISTORY:       Ms. Netta Avila is a very pleasant 49 y.o. female with no major past health problems.. She was doing fine with routine mammogram being normal until this year where she had suspicious abnormalities in the mammogram done on 2022.  Diagnostic mammogram 2022 showed area of architectural distortion at the left breast upper outer quadrant.. A biopsy was performed on 2022 and unfortunately that was positive for invasive ductal carcinoma.  ER positive, WI positive, HER2/checo not amplified.  The patient is seen today for further management of her breast cancer. The patient did very well after her biopsy without any complication. The patient had no symptom preceding her diagnosis of cancer. No chest pain, shortness of breath, cough, sputum or hemoptysis, no back pain or terry aches, no weight loss or decreased appetite, no fever or night sweating. No headaches, and no other complaints.    INTERIM HISTORY:   Seen for follow up breast cancer. Started Tamoxifen 2022. She has metallic taste and body aches.  Tolerated well otherwise.   No lumps or masses. No other complaints at the present time.    GYNECOLOGICAL HISTORY  Menarche at age 13. Hysterectomy age 36, one ovary, endometriosis. +0. Age at first full term pregnancy 27. No use of HRT.    PAST MEDICAL HISTORY: has a past medical history of Anxiety, Bilateral carpal tunnel syndrome, Breast

## 2024-12-06 NOTE — TELEPHONE ENCOUNTER
Instructions   from Dr. Farhad Frias MD    Continue Tamoxifen  RV 6 months  Bilateral mammogram after 4/18/25    RV scheduled 6/6/25 at 10:15am  Gave pt number to call to scheduled mamm

## 2024-12-16 ENCOUNTER — OFFICE VISIT (OUTPATIENT)
Dept: ONCOLOGY | Age: 49
End: 2024-12-16
Payer: COMMERCIAL

## 2024-12-16 DIAGNOSIS — C50.412 MALIGNANT NEOPLASM OF UPPER-OUTER QUADRANT OF LEFT BREAST IN FEMALE, ESTROGEN RECEPTOR POSITIVE (HCC): Primary | ICD-10-CM

## 2024-12-16 DIAGNOSIS — Z17.0 MALIGNANT NEOPLASM OF UPPER-OUTER QUADRANT OF LEFT BREAST IN FEMALE, ESTROGEN RECEPTOR POSITIVE (HCC): Primary | ICD-10-CM

## 2024-12-16 PROCEDURE — 99212 OFFICE O/P EST SF 10 MIN: CPT | Performed by: INTERNAL MEDICINE

## 2024-12-16 NOTE — PROGRESS NOTES
Patient: APRIL COLBY  MRN: [id number]  PCP: KOLTON BERRY MD  Oncologist: BING MONROE MD    Date of test:  12/16/24  Tested Body Part: ARM  Testing Stage:  SURVEILLANCE     SOZO Y2 2 OF 4 ASSESSMENT:  BASELINE: 0.0  CURRENT: 3.6  CHANGE FROM BASE: 3.6    You will be re-tested following cancer treatment:   Every 3 months for the first 3 years following treatment   Every 6 months during years 4 and 5 following treatment   Annually thereafter       Currently you are following the 3 Months follow up treatment.   Next Scheduled visit: 3/11/25 at 9:00 AM.

## 2025-03-11 ENCOUNTER — OFFICE VISIT (OUTPATIENT)
Dept: ONCOLOGY | Age: 50
End: 2025-03-11

## 2025-03-11 DIAGNOSIS — C50.412 MALIGNANT NEOPLASM OF UPPER-OUTER QUADRANT OF LEFT FEMALE BREAST, UNSPECIFIED ESTROGEN RECEPTOR STATUS (HCC): Primary | ICD-10-CM

## 2025-03-11 NOTE — PROGRESS NOTES
Patient: April Toeppe  MRN: [id number]  PCP: Bruce Olivera MD  Oncologist: Farhad Trevino    Date of test: 3/11/25   Tested Body Part: ARM  Testing Stage: SURVEILLANCE      SOZO SURVEILLANCE ASSESSMENT:  BASELINE 0.0  CURRENT: 4.7  CHANGE FROM BASE:  4.7    NEXT APPT SCHEDULED 6/6/25

## 2025-04-21 ENCOUNTER — HOSPITAL ENCOUNTER (OUTPATIENT)
Dept: WOMENS IMAGING | Age: 50
Discharge: HOME | End: 2025-04-23
Attending: INTERNAL MEDICINE
Payer: COMMERCIAL

## 2025-04-21 VITALS — BODY MASS INDEX: 35.68 KG/M2 | WEIGHT: 209 LBS | HEIGHT: 64 IN

## 2025-04-21 DIAGNOSIS — C50.412 MALIGNANT NEOPLASM OF UPPER-OUTER QUADRANT OF LEFT BREAST IN FEMALE, ESTROGEN RECEPTOR POSITIVE (HCC): ICD-10-CM

## 2025-04-21 DIAGNOSIS — Z12.31 OTHER SCREENING MAMMOGRAM: ICD-10-CM

## 2025-04-21 DIAGNOSIS — Z17.0 MALIGNANT NEOPLASM OF UPPER-OUTER QUADRANT OF LEFT BREAST IN FEMALE, ESTROGEN RECEPTOR POSITIVE (HCC): ICD-10-CM

## 2025-04-21 PROCEDURE — 77067 SCR MAMMO BI INCL CAD: CPT

## 2025-05-08 RX ORDER — TAMOXIFEN CITRATE 20 MG/1
20 TABLET ORAL DAILY
Qty: 90 TABLET | Refills: 3 | Status: SHIPPED | OUTPATIENT
Start: 2025-05-08

## 2025-06-06 ENCOUNTER — TELEPHONE (OUTPATIENT)
Age: 50
End: 2025-06-06

## 2025-06-06 ENCOUNTER — OFFICE VISIT (OUTPATIENT)
Age: 50
End: 2025-06-06
Payer: COMMERCIAL

## 2025-06-06 VITALS
SYSTOLIC BLOOD PRESSURE: 142 MMHG | WEIGHT: 220.8 LBS | OXYGEN SATURATION: 99 % | BODY MASS INDEX: 37.9 KG/M2 | HEART RATE: 87 BPM | TEMPERATURE: 97.8 F | DIASTOLIC BLOOD PRESSURE: 81 MMHG | RESPIRATION RATE: 14 BRPM

## 2025-06-06 DIAGNOSIS — C50.412 MALIGNANT NEOPLASM OF UPPER-OUTER QUADRANT OF LEFT BREAST IN FEMALE, ESTROGEN RECEPTOR POSITIVE (HCC): Primary | ICD-10-CM

## 2025-06-06 DIAGNOSIS — Z17.0 MALIGNANT NEOPLASM OF UPPER-OUTER QUADRANT OF LEFT BREAST IN FEMALE, ESTROGEN RECEPTOR POSITIVE (HCC): Primary | ICD-10-CM

## 2025-06-06 PROCEDURE — 99214 OFFICE O/P EST MOD 30 MIN: CPT | Performed by: INTERNAL MEDICINE

## 2025-06-06 PROCEDURE — 3077F SYST BP >= 140 MM HG: CPT | Performed by: INTERNAL MEDICINE

## 2025-06-06 PROCEDURE — 3079F DIAST BP 80-89 MM HG: CPT | Performed by: INTERNAL MEDICINE

## 2025-06-06 RX ORDER — ZOLPIDEM TARTRATE 10 MG/1
10 TABLET ORAL NIGHTLY PRN
COMMUNITY

## 2025-06-06 RX ORDER — CELECOXIB 200 MG/1
200 CAPSULE ORAL 2 TIMES DAILY
Qty: 60 CAPSULE | Refills: 3 | Status: SHIPPED | OUTPATIENT
Start: 2025-06-06

## 2025-06-06 RX ORDER — TAMOXIFEN CITRATE 20 MG/1
20 TABLET ORAL DAILY
Qty: 90 TABLET | Refills: 3 | Status: SHIPPED | OUTPATIENT
Start: 2025-06-06

## 2025-06-06 NOTE — TELEPHONE ENCOUNTER
Instructions   from Dr. Farhad Frias MD    Continue Tamoxifen  RV 6 months      RV 12/5/25 at 9:45 am   moderate assist (50% patients effort)/maximum assist (25% patients effort)

## 2025-06-06 NOTE — PROGRESS NOTES
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  ER positive, MN positive, HER2/checo not amplified.  Oncotype Dx with low recurrence score and no benefits of chemotherapy.   CURRENT THERAPY:         S/p lumpectomy and sentinel LN biopsy 22  S/p radiation therapy completed 2022  Started Tamoxifen 2022.   BRIEF CASE HISTORY:       Ms. Netta Avila is a very pleasant 50 y.o. female with no major past health problems.. She was doing fine with routine mammogram being normal until this year where she had suspicious abnormalities in the mammogram done on 2022.  Diagnostic mammogram 2022 showed area of architectural distortion at the left breast upper outer quadrant.. A biopsy was performed on 2022 and unfortunately that was positive for invasive ductal carcinoma.  ER positive, MN positive, HER2/checo not amplified.  The patient is seen today for further management of her breast cancer. The patient did very well after her biopsy without any complication. The patient had no symptom preceding her diagnosis of cancer. No chest pain, shortness of breath, cough, sputum or hemoptysis, no back pain or terry aches, no weight loss or decreased appetite, no fever or night sweating. No headaches, and no other complaints.    INTERIM HISTORY:   Seen for follow up breast cancer. Started Tamoxifen 2022. She has metallic taste and body aches.  Tolerated well otherwise.   No lumps or masses.  Patient has some bodyaches secondary to tamoxifen.  No other complaints at the present time.    GYNECOLOGICAL HISTORY  Menarche at age 13. Hysterectomy age 36, one ovary, endometriosis. +0. Age at first full term pregnancy 27. No use of HRT.    PAST MEDICAL HISTORY: has a past medical history of Anxiety, Bilateral carpal tunnel syndrome, Breast cancer (HCC), Carcinoma

## 2025-08-29 ENCOUNTER — TRANSCRIBE ORDERS (OUTPATIENT)
Dept: ADMINISTRATIVE | Age: 50
End: 2025-08-29

## 2025-08-29 DIAGNOSIS — R10.9 BILATERAL FLANK PAIN: Primary | ICD-10-CM

## 2025-09-03 ENCOUNTER — TRANSCRIBE ORDERS (OUTPATIENT)
Dept: ADMINISTRATIVE | Age: 50
End: 2025-09-03

## 2025-09-03 DIAGNOSIS — R10.9 BILATERAL FLANK PAIN: Primary | ICD-10-CM

## (undated) DEVICE — PROBE LOCALIZER W/DRAPE

## (undated) DEVICE — MHPB GEN MINOR PACK: Brand: MEDLINE INDUSTRIES, INC.

## (undated) DEVICE — SUTURE VCRL + SZ 3-0 L27IN ABSRB UD L26MM SH 1/2 CIR VCP416H

## (undated) DEVICE — INTENDED FOR TISSUE SEPARATION, AND OTHER PROCEDURES THAT REQUIRE A SHARP SURGICAL BLADE TO PUNCTURE OR CUT.: Brand: BARD-PARKER ® CARBON RIB-BACK BLADES

## (undated) DEVICE — 3M™ WARMING BLANKET, LOWER BODY, 10 PER CASE, 42568: Brand: BAIR HUGGER™

## (undated) DEVICE — ELECTRODE PT RET AD L9FT HI MOIST COND ADH HYDRGEL CORDED

## (undated) DEVICE — CHLORAPREP 26ML ORANGE

## (undated) DEVICE — DRAPE,UTILITY,XL,4/PK,STERILE: Brand: MEDLINE

## (undated) DEVICE — SPONGE GZ 4X4 IN 16-PLY DETECTABLE W/ DMT MSTR TAG

## (undated) DEVICE — SUTURE MCRYL + SZ 4-0 L27IN ABSRB UD L19MM PS-2 3/8 CIR MCP426H

## (undated) DEVICE — GLOVE ORANGE PI 7   MSG9070

## (undated) DEVICE — MASTISOL ADHESIVE AMPULE

## (undated) DEVICE — DRAPE,REIN 53X77,STERILE: Brand: MEDLINE

## (undated) DEVICE — STRIP,CLOSURE,WOUND,MEDI-STRIP,1/2X4: Brand: MEDLINE

## (undated) DEVICE — DEFENDO AIR WATER SUCTION AND BIOPSY VALVE KIT FOR  OLYMPUS: Brand: DEFENDO AIR/WATER/SUCTION AND BIOPSY VALVE

## (undated) DEVICE — KIT CLN UP LIN W/ STD SAHARA TBL SHT 40X60IN DRAW/LIFT SHT

## (undated) DEVICE — SUTURE VCRL SZ 3-0 L27IN ABSRB UD L26MM SH 1/2 CIR J416H

## (undated) DEVICE — GRID BX L4.65IN RADLUC FOR SAFE IMAG TRNSPRT PROC BRST SPEC

## (undated) DEVICE — GLOVE ORANGE PI 7 1/2   MSG9075

## (undated) DEVICE — GAUZE,SPONGE,FLUFF,6"X6.75",STRL,5/TRAY: Brand: MEDLINE

## (undated) DEVICE — 4-PORT MANIFOLD: Brand: NEPTUNE 2

## (undated) DEVICE — 9165 UNIVERSAL PATIENT PLATE: Brand: 3M™

## (undated) DEVICE — Device

## (undated) DEVICE — PENCIL ES L3M BTTN SWCH HOLSTER W/ BLDE ELECTRD EDGE

## (undated) DEVICE — ENDO KIT W/SYRINGE: Brand: MEDLINE INDUSTRIES, INC.

## (undated) DEVICE — SUTURE PERMAHAND SZ 3-0 L30IN NONABSORBABLE BLK SH L26MM K832H